# Patient Record
Sex: MALE | Race: WHITE | NOT HISPANIC OR LATINO
[De-identification: names, ages, dates, MRNs, and addresses within clinical notes are randomized per-mention and may not be internally consistent; named-entity substitution may affect disease eponyms.]

---

## 2017-01-09 ENCOUNTER — RX RENEWAL (OUTPATIENT)
Age: 57
End: 2017-01-09

## 2017-01-19 ENCOUNTER — MEDICATION RENEWAL (OUTPATIENT)
Age: 57
End: 2017-01-19

## 2017-02-01 ENCOUNTER — NON-APPOINTMENT (OUTPATIENT)
Age: 57
End: 2017-02-01

## 2017-02-01 ENCOUNTER — APPOINTMENT (OUTPATIENT)
Dept: CARDIOLOGY | Facility: CLINIC | Age: 57
End: 2017-02-01

## 2017-02-01 VITALS
HEART RATE: 87 BPM | SYSTOLIC BLOOD PRESSURE: 138 MMHG | OXYGEN SATURATION: 98 % | TEMPERATURE: 98.2 F | HEIGHT: 70 IN | RESPIRATION RATE: 12 BRPM | DIASTOLIC BLOOD PRESSURE: 86 MMHG | WEIGHT: 272 LBS | BODY MASS INDEX: 38.94 KG/M2

## 2017-02-01 VITALS — DIASTOLIC BLOOD PRESSURE: 75 MMHG | SYSTOLIC BLOOD PRESSURE: 115 MMHG

## 2017-02-02 LAB
24R-OH-CALCIDIOL SERPL-MCNC: 19.3 PG/ML
ALBUMIN SERPL ELPH-MCNC: 4.1 G/DL
ALP BLD-CCNC: 59 U/L
ALT SERPL-CCNC: 20 U/L
ANION GAP SERPL CALC-SCNC: 14 MMOL/L
AST SERPL-CCNC: 21 U/L
BASOPHILS # BLD AUTO: 0.07 K/UL
BASOPHILS NFR BLD AUTO: 0.8 %
BILIRUB SERPL-MCNC: 0.6 MG/DL
BUN SERPL-MCNC: 18 MG/DL
CALCIUM SERPL-MCNC: 10 MG/DL
CHLORIDE SERPL-SCNC: 99 MMOL/L
CHOLEST SERPL-MCNC: 232 MG/DL
CHOLEST/HDLC SERPL: 7.5 RATIO
CO2 SERPL-SCNC: 24 MMOL/L
CREAT SERPL-MCNC: 0.99 MG/DL
EOSINOPHIL # BLD AUTO: 0.13 K/UL
EOSINOPHIL NFR BLD AUTO: 1.5 %
GLUCOSE SERPL-MCNC: 158 MG/DL
HBA1C MFR BLD HPLC: 8.3 %
HCT VFR BLD CALC: 44 %
HDLC SERPL-MCNC: 31 MG/DL
HGB BLD-MCNC: 14.1 G/DL
IMM GRANULOCYTES NFR BLD AUTO: 0.2 %
LDLC SERPL CALC-MCNC: 148 MG/DL
LYMPHOCYTES # BLD AUTO: 2.56 K/UL
LYMPHOCYTES NFR BLD AUTO: 29.3 %
MAN DIFF?: NORMAL
MCHC RBC-ENTMCNC: 28.7 PG
MCHC RBC-ENTMCNC: 32 GM/DL
MCV RBC AUTO: 89.6 FL
MONOCYTES # BLD AUTO: 0.53 K/UL
MONOCYTES NFR BLD AUTO: 6.1 %
NEUTROPHILS # BLD AUTO: 5.43 K/UL
NEUTROPHILS NFR BLD AUTO: 62.1 %
PLATELET # BLD AUTO: 299 K/UL
POTASSIUM SERPL-SCNC: 4.5 MMOL/L
PROT SERPL-MCNC: 7.9 G/DL
RBC # BLD: 4.91 M/UL
RBC # FLD: 13.3 %
SODIUM SERPL-SCNC: 137 MMOL/L
TRIGL SERPL-MCNC: 263 MG/DL
WBC # FLD AUTO: 8.74 K/UL

## 2017-02-13 ENCOUNTER — APPOINTMENT (OUTPATIENT)
Dept: UROLOGY | Facility: CLINIC | Age: 57
End: 2017-02-13

## 2017-02-13 VITALS
RESPIRATION RATE: 12 BRPM | BODY MASS INDEX: 39.51 KG/M2 | DIASTOLIC BLOOD PRESSURE: 85 MMHG | SYSTOLIC BLOOD PRESSURE: 133 MMHG | HEIGHT: 70 IN | OXYGEN SATURATION: 98 % | WEIGHT: 276 LBS | HEART RATE: 86 BPM

## 2017-02-13 DIAGNOSIS — I21.09 ST ELEVATION (STEMI) MYOCARDIAL INFARCTION INVOLVING OTHER CORONARY ARTERY OF ANTERIOR WALL: ICD-10-CM

## 2017-03-06 ENCOUNTER — MEDICATION RENEWAL (OUTPATIENT)
Age: 57
End: 2017-03-06

## 2017-03-13 ENCOUNTER — APPOINTMENT (OUTPATIENT)
Dept: UROLOGY | Facility: CLINIC | Age: 57
End: 2017-03-13

## 2017-04-04 ENCOUNTER — APPOINTMENT (OUTPATIENT)
Dept: ENDOCRINOLOGY | Facility: CLINIC | Age: 57
End: 2017-04-04

## 2017-05-08 ENCOUNTER — APPOINTMENT (OUTPATIENT)
Dept: CARDIOLOGY | Facility: CLINIC | Age: 57
End: 2017-05-08

## 2017-07-28 ENCOUNTER — EMERGENCY (EMERGENCY)
Facility: HOSPITAL | Age: 57
LOS: 1 days | Discharge: HOME | End: 2017-07-28
Attending: EMERGENCY MEDICINE | Admitting: EMERGENCY MEDICINE
Payer: COMMERCIAL

## 2017-07-28 VITALS
RESPIRATION RATE: 16 BRPM | TEMPERATURE: 99 F | HEART RATE: 110 BPM | DIASTOLIC BLOOD PRESSURE: 101 MMHG | OXYGEN SATURATION: 98 % | SYSTOLIC BLOOD PRESSURE: 162 MMHG

## 2017-07-28 DIAGNOSIS — S62.101A FRACTURE OF UNSPECIFIED CARPAL BONE, RIGHT WRIST, INITIAL ENCOUNTER FOR CLOSED FRACTURE: Chronic | ICD-10-CM

## 2017-07-28 DIAGNOSIS — M43.22 FUSION OF SPINE, CERVICAL REGION: Chronic | ICD-10-CM

## 2017-07-28 PROCEDURE — 99285 EMERGENCY DEPT VISIT HI MDM: CPT | Mod: 25
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
## 2017-07-29 VITALS
SYSTOLIC BLOOD PRESSURE: 148 MMHG | HEART RATE: 90 BPM | RESPIRATION RATE: 18 BRPM | DIASTOLIC BLOOD PRESSURE: 84 MMHG | OXYGEN SATURATION: 98 % | TEMPERATURE: 98 F

## 2017-07-29 PROCEDURE — 73100 X-RAY EXAM OF WRIST: CPT | Mod: 26,LT

## 2017-07-29 PROCEDURE — 73090 X-RAY EXAM OF FOREARM: CPT | Mod: 26,LT

## 2017-07-29 PROCEDURE — 73060 X-RAY EXAM OF HUMERUS: CPT | Mod: 26,LT

## 2017-07-29 PROCEDURE — 73130 X-RAY EXAM OF HAND: CPT | Mod: 26,LT

## 2017-07-29 PROCEDURE — 73080 X-RAY EXAM OF ELBOW: CPT | Mod: 26,LT

## 2017-07-29 PROCEDURE — 71010: CPT | Mod: 26

## 2017-07-29 PROCEDURE — 70486 CT MAXILLOFACIAL W/O DYE: CPT | Mod: 26

## 2017-07-29 PROCEDURE — 71101 X-RAY EXAM UNILAT RIBS/CHEST: CPT | Mod: 26,RT

## 2017-07-29 PROCEDURE — 70450 CT HEAD/BRAIN W/O DYE: CPT | Mod: 26

## 2017-07-29 RX ORDER — OXYCODONE AND ACETAMINOPHEN 5; 325 MG/1; MG/1
1 TABLET ORAL ONCE
Qty: 0 | Refills: 0 | Status: DISCONTINUED | OUTPATIENT
Start: 2017-07-29 | End: 2017-07-29

## 2017-07-29 RX ORDER — TETANUS TOXOID, REDUCED DIPHTHERIA TOXOID AND ACELLULAR PERTUSSIS VACCINE, ADSORBED 5; 2.5; 8; 8; 2.5 [IU]/.5ML; [IU]/.5ML; UG/.5ML; UG/.5ML; UG/.5ML
0.5 SUSPENSION INTRAMUSCULAR ONCE
Qty: 0 | Refills: 0 | Status: COMPLETED | OUTPATIENT
Start: 2017-07-29 | End: 2017-07-29

## 2017-07-29 RX ORDER — MORPHINE SULFATE 50 MG/1
6 CAPSULE, EXTENDED RELEASE ORAL ONCE
Qty: 0 | Refills: 0 | Status: DISCONTINUED | OUTPATIENT
Start: 2017-07-29 | End: 2017-07-29

## 2017-07-29 RX ADMIN — TETANUS TOXOID, REDUCED DIPHTHERIA TOXOID AND ACELLULAR PERTUSSIS VACCINE, ADSORBED 0.5 MILLILITER(S): 5; 2.5; 8; 8; 2.5 SUSPENSION INTRAMUSCULAR at 02:23

## 2017-07-29 RX ADMIN — OXYCODONE AND ACETAMINOPHEN 1 TABLET(S): 5; 325 TABLET ORAL at 02:21

## 2017-07-29 RX ADMIN — MORPHINE SULFATE 6 MILLIGRAM(S): 50 CAPSULE, EXTENDED RELEASE ORAL at 02:35

## 2017-07-29 RX ADMIN — MORPHINE SULFATE 6 MILLIGRAM(S): 50 CAPSULE, EXTENDED RELEASE ORAL at 04:58

## 2017-07-29 RX ADMIN — OXYCODONE AND ACETAMINOPHEN 1 TABLET(S): 5; 325 TABLET ORAL at 01:21

## 2017-07-29 RX ADMIN — MORPHINE SULFATE 6 MILLIGRAM(S): 50 CAPSULE, EXTENDED RELEASE ORAL at 05:12

## 2017-07-29 RX ADMIN — MORPHINE SULFATE 6 MILLIGRAM(S): 50 CAPSULE, EXTENDED RELEASE ORAL at 02:50

## 2017-07-29 NOTE — ED ADULT NURSE REASSESSMENT NOTE - NS ED NURSE REASSESS COMMENT FT1
pt. in NAD at this time, Ortho at bedside to put cast on left wrist. MOrphine 6mg given as ordered prior to procedure.

## 2017-07-29 NOTE — ED ADULT NURSE REASSESSMENT NOTE - NS ED NURSE REASSESS COMMENT FT1
Pt has cast in place to L Forearm, verified placement by Ortho & XR.  Pt states readiness for DC to home.  IVL dc'd intact. MD Logdberg @ bedside to review dc instructions & follow up.  Wife @ bedside to asst pt in getting dressed and wheelchair provided to get to car.
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
## 2017-07-29 NOTE — ED PROVIDER NOTE - PROGRESS NOTE DETAILS
Logdberg PGY4: Distal radius/ulna fx on xray, otherwise imaging wnl except facial hematoma. Seen by ortho and reduced, feeling better. given rx for percocets. Instructed f/u ortho in 1 week. Ready for d/c.
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
## 2017-07-29 NOTE — ED PROVIDER NOTE - MUSCULOSKELETAL MINIMAL EXAM
RANGE OF MOTION LIMITED/L wrist and forearm swollen, limited ROM; radial pulses intact b/l; full ROM L hand; hematoma L eye
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
## 2017-08-02 ENCOUNTER — APPOINTMENT (OUTPATIENT)
Dept: ORTHOPEDIC SURGERY | Facility: CLINIC | Age: 57
End: 2017-08-02
Payer: COMMERCIAL

## 2017-08-02 PROCEDURE — 99204 OFFICE O/P NEW MOD 45 MIN: CPT

## 2017-08-03 ENCOUNTER — APPOINTMENT (OUTPATIENT)
Dept: CARDIOLOGY | Facility: CLINIC | Age: 57
End: 2017-08-03
Payer: COMMERCIAL

## 2017-08-03 ENCOUNTER — APPOINTMENT (OUTPATIENT)
Dept: INTERNAL MEDICINE | Facility: CLINIC | Age: 57
End: 2017-08-03
Payer: COMMERCIAL

## 2017-08-03 ENCOUNTER — OUTPATIENT (OUTPATIENT)
Dept: OUTPATIENT SERVICES | Facility: HOSPITAL | Age: 57
LOS: 1 days | End: 2017-08-03
Payer: COMMERCIAL

## 2017-08-03 ENCOUNTER — NON-APPOINTMENT (OUTPATIENT)
Age: 57
End: 2017-08-03

## 2017-08-03 VITALS
HEART RATE: 80 BPM | HEIGHT: 69 IN | RESPIRATION RATE: 18 BRPM | DIASTOLIC BLOOD PRESSURE: 80 MMHG | WEIGHT: 270.07 LBS | SYSTOLIC BLOOD PRESSURE: 118 MMHG | TEMPERATURE: 98 F

## 2017-08-03 VITALS
HEART RATE: 108 BPM | WEIGHT: 274 LBS | DIASTOLIC BLOOD PRESSURE: 79 MMHG | TEMPERATURE: 98 F | HEIGHT: 70 IN | OXYGEN SATURATION: 95 % | SYSTOLIC BLOOD PRESSURE: 130 MMHG | BODY MASS INDEX: 39.22 KG/M2 | RESPIRATION RATE: 12 BRPM

## 2017-08-03 DIAGNOSIS — S52.122A DISPLACED FRACTURE OF HEAD OF LEFT RADIUS, INITIAL ENCOUNTER FOR CLOSED FRACTURE: ICD-10-CM

## 2017-08-03 DIAGNOSIS — S52.572A OTHER INTRAARTICULAR FRACTURE OF LOWER END OF LEFT RADIUS, INITIAL ENCOUNTER FOR CLOSED FRACTURE: ICD-10-CM

## 2017-08-03 DIAGNOSIS — Z01.818 ENCOUNTER FOR OTHER PREPROCEDURAL EXAMINATION: ICD-10-CM

## 2017-08-03 DIAGNOSIS — E11.9 TYPE 2 DIABETES MELLITUS WITHOUT COMPLICATIONS: ICD-10-CM

## 2017-08-03 DIAGNOSIS — M43.22 FUSION OF SPINE, CERVICAL REGION: Chronic | ICD-10-CM

## 2017-08-03 DIAGNOSIS — Z98.890 OTHER SPECIFIED POSTPROCEDURAL STATES: Chronic | ICD-10-CM

## 2017-08-03 DIAGNOSIS — G47.33 OBSTRUCTIVE SLEEP APNEA (ADULT) (PEDIATRIC): ICD-10-CM

## 2017-08-03 DIAGNOSIS — I50.9 HEART FAILURE, UNSPECIFIED: ICD-10-CM

## 2017-08-03 DIAGNOSIS — S62.101A FRACTURE OF UNSPECIFIED CARPAL BONE, RIGHT WRIST, INITIAL ENCOUNTER FOR CLOSED FRACTURE: Chronic | ICD-10-CM

## 2017-08-03 DIAGNOSIS — K59.00 CONSTIPATION, UNSPECIFIED: ICD-10-CM

## 2017-08-03 DIAGNOSIS — I10 ESSENTIAL (PRIMARY) HYPERTENSION: ICD-10-CM

## 2017-08-03 LAB
BUN SERPL-MCNC: 17 MG/DL — SIGNIFICANT CHANGE UP (ref 7–23)
CALCIUM SERPL-MCNC: 9.5 MG/DL — SIGNIFICANT CHANGE UP (ref 8.4–10.5)
CHLORIDE SERPL-SCNC: 97 MMOL/L — LOW (ref 98–107)
CO2 SERPL-SCNC: 27 MMOL/L — SIGNIFICANT CHANGE UP (ref 22–31)
CREAT SERPL-MCNC: 1 MG/DL — SIGNIFICANT CHANGE UP (ref 0.5–1.3)
GLUCOSE SERPL-MCNC: 240 MG/DL — HIGH (ref 70–99)
HBA1C BLD-MCNC: 8.2 % — HIGH (ref 4–5.6)
HCT VFR BLD CALC: 36.6 % — LOW (ref 39–50)
HGB BLD-MCNC: 12.3 G/DL — LOW (ref 13–17)
MCHC RBC-ENTMCNC: 28.8 PG — SIGNIFICANT CHANGE UP (ref 27–34)
MCHC RBC-ENTMCNC: 33.6 % — SIGNIFICANT CHANGE UP (ref 32–36)
MCV RBC AUTO: 85.7 FL — SIGNIFICANT CHANGE UP (ref 80–100)
NRBC # FLD: 0 — SIGNIFICANT CHANGE UP
PLATELET # BLD AUTO: 275 K/UL — SIGNIFICANT CHANGE UP (ref 150–400)
PMV BLD: 9.7 FL — SIGNIFICANT CHANGE UP (ref 7–13)
POTASSIUM SERPL-MCNC: 4.2 MMOL/L — SIGNIFICANT CHANGE UP (ref 3.5–5.3)
POTASSIUM SERPL-SCNC: 4.2 MMOL/L — SIGNIFICANT CHANGE UP (ref 3.5–5.3)
RBC # BLD: 4.27 M/UL — SIGNIFICANT CHANGE UP (ref 4.2–5.8)
RBC # FLD: 13 % — SIGNIFICANT CHANGE UP (ref 10.3–14.5)
SODIUM SERPL-SCNC: 136 MMOL/L — SIGNIFICANT CHANGE UP (ref 135–145)
WBC # BLD: 7.92 K/UL — SIGNIFICANT CHANGE UP (ref 3.8–10.5)
WBC # FLD AUTO: 7.92 K/UL — SIGNIFICANT CHANGE UP (ref 3.8–10.5)

## 2017-08-03 PROCEDURE — 99214 OFFICE O/P EST MOD 30 MIN: CPT

## 2017-08-03 PROCEDURE — 99215 OFFICE O/P EST HI 40 MIN: CPT | Mod: 25

## 2017-08-03 PROCEDURE — 93010 ELECTROCARDIOGRAM REPORT: CPT

## 2017-08-03 PROCEDURE — 85610 PROTHROMBIN TIME: CPT | Mod: QW

## 2017-08-03 RX ORDER — SODIUM CHLORIDE 9 MG/ML
1000 INJECTION, SOLUTION INTRAVENOUS
Qty: 0 | Refills: 0 | Status: DISCONTINUED | OUTPATIENT
Start: 2017-08-04 | End: 2017-08-19

## 2017-08-03 RX ORDER — METHYLPREDNISOLONE 4 MG/1
4 TABLET ORAL
Qty: 21 | Refills: 0 | Status: DISCONTINUED | COMMUNITY
Start: 2017-06-13 | End: 2017-08-03

## 2017-08-03 RX ORDER — CEPHALEXIN 500 MG/1
500 CAPSULE ORAL
Qty: 28 | Refills: 0 | Status: DISCONTINUED | COMMUNITY
Start: 2017-06-05 | End: 2017-08-03

## 2017-08-03 RX ORDER — CLOBETASOL PROPIONATE 0.5 MG/G
0.05 CREAM TOPICAL
Qty: 60 | Refills: 0 | Status: DISCONTINUED | COMMUNITY
Start: 2017-06-13 | End: 2017-08-03

## 2017-08-03 NOTE — H&P PST ADULT - LYMPHATIC
anterior cervical L/anterior cervical R/posterior cervical L/posterior cervical R/supraclavicular L/supraclavicular R

## 2017-08-03 NOTE — H&P PST ADULT - PMH
CAD (coronary artery disease)    DM (diabetes mellitus)    Essential hypertension    Heart failure  4/2016 , request last echo to be faxed from Dr Quispe with cardiac clearance  Obesity (BMI 30-39.9)  BMI 39.9  Other hyperlipidemia    Stented coronary artery  times 6 ; last 4/2016 total of 6 stents , on brilinta and aspirin which has not been discontinued , cardiac evaluation scheduled today

## 2017-08-03 NOTE — H&P PST ADULT - NSANTHOSAYNRD_GEN_A_CORE
No. REYMUNDO screening performed.  STOP BANG Legend: 0-2 = LOW Risk; 3-4 = INTERMEDIATE Risk; 5-8 = HIGH Risk

## 2017-08-03 NOTE — H&P PST ADULT - PROBLEM SELECTOR PLAN 1
Left distal radius open reduction internal fixation   Cardiac and medical clearance today , one day pre-op ; OR tomorrow  discussed with anesthesia, Medical director for PST

## 2017-08-03 NOTE — H&P PST ADULT - MUSCULOSKELETAL COMMENTS
left arm pain left arm in splint from hand to just over elbow , all digits mobile , warm to touch ,swollen

## 2017-08-03 NOTE — H&P PST ADULT - NEGATIVE ENMT SYMPTOMS
no ear pain/no post-nasal discharge/no dysphagia/no hearing difficulty/no gum bleeding/no nose bleeds/no throat pain

## 2017-08-03 NOTE — H&P PST ADULT - TEACHING/LEARNING LEARNING PREFERENCES
computer/internet/group instruction/pictorial/audio/verbal instruction/skill demonstration/video/written material/individual instruction

## 2017-08-03 NOTE — H&P PST ADULT - FAMILY HISTORY
Father  Still living? Unknown  Family history of coronary artery disease, Age at diagnosis: Age Unknown  Family history of diabetes mellitus (DM), Age at diagnosis: Age Unknown     Mother  Still living? Unknown  Family history of coronary artery disease, Age at diagnosis: Age Unknown  Family history of diabetes mellitus (DM), Age at diagnosis: Age Unknown

## 2017-08-03 NOTE — H&P PST ADULT - PSH
Cervical vertebral fusion  with hardware  H/O shoulder surgery  right - 1990's  Right wrist fracture  with hardware

## 2017-08-03 NOTE — H&P PST ADULT - PROBLEM SELECTOR PLAN 4
bloods pending including hgba1c. Pt to hold metformin tonight and in am , along with glipizide . STAT FS upon admit , monitor FS during hospital stay .

## 2017-08-03 NOTE — H&P PST ADULT - PROBLEM SELECTOR PLAN 5
STOP BANG 4 ; REYMUNDO precautions called to OR booking , left message , precautions faxed to OR booking.

## 2017-08-03 NOTE — H&P PST ADULT - PROBLEM SELECTOR PLAN 3
4/2016 with stents to heart for total of 6 , cardiac clearance pending today . Pt is on brilinta and aspirin , both to continue for surgery tomorrow. Requested last echo to be faxed .
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
## 2017-08-04 ENCOUNTER — APPOINTMENT (OUTPATIENT)
Dept: ORTHOPEDIC SURGERY | Facility: HOSPITAL | Age: 57
End: 2017-08-04

## 2017-08-04 ENCOUNTER — OUTPATIENT (OUTPATIENT)
Dept: OUTPATIENT SERVICES | Facility: HOSPITAL | Age: 57
LOS: 1 days | Discharge: HOME | End: 2017-08-04
Payer: COMMERCIAL

## 2017-08-04 ENCOUNTER — TRANSCRIPTION ENCOUNTER (OUTPATIENT)
Age: 57
End: 2017-08-04

## 2017-08-04 VITALS
OXYGEN SATURATION: 96 % | DIASTOLIC BLOOD PRESSURE: 75 MMHG | WEIGHT: 270.07 LBS | HEIGHT: 69 IN | SYSTOLIC BLOOD PRESSURE: 124 MMHG | HEART RATE: 81 BPM | TEMPERATURE: 99 F | RESPIRATION RATE: 15 BRPM

## 2017-08-04 VITALS
OXYGEN SATURATION: 100 % | RESPIRATION RATE: 16 BRPM | SYSTOLIC BLOOD PRESSURE: 153 MMHG | DIASTOLIC BLOOD PRESSURE: 70 MMHG | HEART RATE: 87 BPM

## 2017-08-04 DIAGNOSIS — S52.572A OTHER INTRAARTICULAR FRACTURE OF LOWER END OF LEFT RADIUS, INITIAL ENCOUNTER FOR CLOSED FRACTURE: ICD-10-CM

## 2017-08-04 DIAGNOSIS — Z98.890 OTHER SPECIFIED POSTPROCEDURAL STATES: Chronic | ICD-10-CM

## 2017-08-04 DIAGNOSIS — M43.22 FUSION OF SPINE, CERVICAL REGION: Chronic | ICD-10-CM

## 2017-08-04 DIAGNOSIS — S62.101A FRACTURE OF UNSPECIFIED CARPAL BONE, RIGHT WRIST, INITIAL ENCOUNTER FOR CLOSED FRACTURE: Chronic | ICD-10-CM

## 2017-08-04 PROBLEM — Z01.818 PRE-OP EVALUATION: Status: ACTIVE | Noted: 2017-08-03

## 2017-08-04 PROBLEM — S52.122A LEFT RADIAL HEAD FRACTURE: Status: ACTIVE | Noted: 2017-08-03

## 2017-08-04 LAB
INR PPP: 1.1 RATIO
POCT-PROTHROMBIN TIME: 12.8 SECS
QUALITY CONTROL: YES

## 2017-08-04 PROCEDURE — 25295 RELEASE WRIST/FOREARM TENDON: CPT | Mod: LT

## 2017-08-04 PROCEDURE — 25609 OPTX DST RD XART FX/EP SEP3+: CPT | Mod: LT

## 2017-08-04 PROCEDURE — 25290 INCISE WRIST/FOREARM TENDON: CPT | Mod: LT

## 2017-08-04 RX ORDER — TICAGRELOR 90 MG/1
1 TABLET ORAL
Qty: 0 | Refills: 0 | COMMUNITY

## 2017-08-04 RX ORDER — SODIUM CHLORIDE 9 MG/ML
1000 INJECTION, SOLUTION INTRAVENOUS
Qty: 0 | Refills: 0 | Status: DISCONTINUED | OUTPATIENT
Start: 2017-08-04 | End: 2017-08-19

## 2017-08-04 RX ORDER — CHOLECALCIFEROL (VITAMIN D3) 125 MCG
1 CAPSULE ORAL
Qty: 0 | Refills: 0 | COMMUNITY

## 2017-08-04 RX ORDER — LISINOPRIL 2.5 MG/1
1 TABLET ORAL
Qty: 0 | Refills: 0 | COMMUNITY

## 2017-08-04 NOTE — ASU DISCHARGE PLAN (ADULT/PEDIATRIC). - SPECIAL INSTRUCTIONS
Pt can and should move fingers and wrist as tolerated.  Non-weightbearing LUE, can d/c sling once block wears off. Keep hand/arm elevated as much as possible to decrease swelling.  Keep dressing intact until f/u appt.

## 2017-08-04 NOTE — ASU DISCHARGE PLAN (ADULT/PEDIATRIC). - NOTIFY
Persistent Nausea and Vomiting/Pain not relieved by Medications/Bleeding that does not stop/Fever greater than 101

## 2017-08-04 NOTE — ASU DISCHARGE PLAN (ADULT/PEDIATRIC). - NURSING INSTRUCTIONS
Stable for discharge   Do not take pain medication on an empty stomach.  Increase fluids and fiber in diet to prevent constipation.    Wear sling until sensation returns completely to your Left Upper Extremity. Sling can be removed to change clothes when sling is off hold your arm to protect it from injury.

## 2017-08-04 NOTE — PROGRESS NOTE ADULT - SUBJECTIVE AND OBJECTIVE BOX
ORTHO ATTENDING POST OP    s/p ORIF L  distal radius  Bulky dressing  strict elevation  keep dressing clean and dry until office visit  sling  NWB LUE  ROM as tolerated RUE  percocet to home pharmacy  f/u next week  encourage ROM fingers

## 2017-08-04 NOTE — ASU DISCHARGE PLAN (ADULT/PEDIATRIC). - MEDICATION SUMMARY - MEDICATIONS TO TAKE
I will START or STAY ON the medications listed below when I get home from the hospital:    Percocet 5/325 325 mg-5 mg oral tablet  -- 1-2 tab(s) by mouth every 4-6 hours, As Needed MDD:9  -- Caution federal law prohibits the transfer of this drug to any person other  than the person for whom it was prescribed.  May cause drowsiness.  Alcohol may intensify this effect.  Use care when operating dangerous machinery.  This prescription cannot be refilled.  This product contains acetaminophen.  Do not use  with any other product containing acetaminophen to prevent possible liver damage.  Using more of this medication than prescribed may cause serious breathing problems.    -- Indication: For pain

## 2017-08-04 NOTE — ASU PATIENT PROFILE, ADULT - TEACHING/LEARNING LEARNING PREFERENCES
video/verbal instruction/skill demonstration/individual instruction/computer/internet/group instruction/audio/pictorial/written material

## 2017-08-09 ENCOUNTER — APPOINTMENT (OUTPATIENT)
Dept: ORTHOPEDIC SURGERY | Facility: CLINIC | Age: 57
End: 2017-08-09
Payer: COMMERCIAL

## 2017-08-09 PROCEDURE — 99024 POSTOP FOLLOW-UP VISIT: CPT

## 2017-09-19 ENCOUNTER — APPOINTMENT (OUTPATIENT)
Dept: ORTHOPEDIC SURGERY | Facility: CLINIC | Age: 57
End: 2017-09-19
Payer: COMMERCIAL

## 2017-09-19 PROCEDURE — 99024 POSTOP FOLLOW-UP VISIT: CPT

## 2017-09-19 PROCEDURE — 73110 X-RAY EXAM OF WRIST: CPT | Mod: LT

## 2017-11-01 ENCOUNTER — APPOINTMENT (OUTPATIENT)
Dept: ORTHOPEDIC SURGERY | Facility: CLINIC | Age: 57
End: 2017-11-01
Payer: COMMERCIAL

## 2017-11-01 DIAGNOSIS — S52.572A OTHER INTRAARTICULAR FRACTURE OF LOWER END OF LEFT RADIUS, INITIAL ENCOUNTER FOR CLOSED FRACTURE: ICD-10-CM

## 2017-11-01 PROCEDURE — 73110 X-RAY EXAM OF WRIST: CPT | Mod: LT

## 2017-11-01 PROCEDURE — 99024 POSTOP FOLLOW-UP VISIT: CPT

## 2017-11-02 PROBLEM — S52.572A: Status: ACTIVE | Noted: 2017-08-04

## 2017-12-27 ENCOUNTER — APPOINTMENT (OUTPATIENT)
Dept: ORTHOPEDIC SURGERY | Facility: CLINIC | Age: 57
End: 2017-12-27

## 2017-12-29 ENCOUNTER — APPOINTMENT (OUTPATIENT)
Dept: INTERNAL MEDICINE | Facility: CLINIC | Age: 57
End: 2017-12-29
Payer: COMMERCIAL

## 2017-12-29 VITALS
HEIGHT: 70 IN | DIASTOLIC BLOOD PRESSURE: 80 MMHG | HEART RATE: 88 BPM | TEMPERATURE: 99.3 F | OXYGEN SATURATION: 98 % | SYSTOLIC BLOOD PRESSURE: 116 MMHG | RESPIRATION RATE: 12 BRPM

## 2017-12-29 DIAGNOSIS — J06.9 ACUTE UPPER RESPIRATORY INFECTION, UNSPECIFIED: ICD-10-CM

## 2017-12-29 PROCEDURE — 99214 OFFICE O/P EST MOD 30 MIN: CPT

## 2018-01-25 ENCOUNTER — NON-APPOINTMENT (OUTPATIENT)
Age: 58
End: 2018-01-25

## 2018-01-25 ENCOUNTER — APPOINTMENT (OUTPATIENT)
Dept: INTERNAL MEDICINE | Facility: CLINIC | Age: 58
End: 2018-01-25
Payer: COMMERCIAL

## 2018-01-25 ENCOUNTER — APPOINTMENT (OUTPATIENT)
Dept: CARDIOLOGY | Facility: CLINIC | Age: 58
End: 2018-01-25
Payer: COMMERCIAL

## 2018-01-25 VITALS
HEART RATE: 99 BPM | TEMPERATURE: 99.8 F | SYSTOLIC BLOOD PRESSURE: 120 MMHG | HEIGHT: 70 IN | WEIGHT: 262 LBS | OXYGEN SATURATION: 100 % | DIASTOLIC BLOOD PRESSURE: 82 MMHG | RESPIRATION RATE: 12 BRPM | BODY MASS INDEX: 37.51 KG/M2

## 2018-01-25 DIAGNOSIS — Z87.09 PERSONAL HISTORY OF OTHER DISEASES OF THE RESPIRATORY SYSTEM: ICD-10-CM

## 2018-01-25 DIAGNOSIS — R50.9 FEVER, UNSPECIFIED: ICD-10-CM

## 2018-01-25 PROCEDURE — 99214 OFFICE O/P EST MOD 30 MIN: CPT

## 2018-01-25 PROCEDURE — 93000 ELECTROCARDIOGRAM COMPLETE: CPT

## 2018-01-25 PROCEDURE — 99215 OFFICE O/P EST HI 40 MIN: CPT | Mod: 25

## 2018-01-26 LAB
ALBUMIN SERPL ELPH-MCNC: 4.1 G/DL
ALP BLD-CCNC: 79 U/L
ALT SERPL-CCNC: 23 U/L
ANION GAP SERPL CALC-SCNC: 16 MMOL/L
AST SERPL-CCNC: 23 U/L
BASOPHILS # BLD AUTO: 0.03 K/UL
BASOPHILS NFR BLD AUTO: 0.4 %
BILIRUB SERPL-MCNC: 0.4 MG/DL
BUN SERPL-MCNC: 21 MG/DL
CALCIUM SERPL-MCNC: 9.7 MG/DL
CHLORIDE SERPL-SCNC: 98 MMOL/L
CHOLEST SERPL-MCNC: 203 MG/DL
CHOLEST/HDLC SERPL: 8.8 RATIO
CO2 SERPL-SCNC: 23 MMOL/L
CREAT SERPL-MCNC: 1.27 MG/DL
EOSINOPHIL # BLD AUTO: 0.19 K/UL
EOSINOPHIL NFR BLD AUTO: 2.8 %
GLUCOSE SERPL-MCNC: 193 MG/DL
HBA1C MFR BLD HPLC: 8.1 %
HCT VFR BLD CALC: 38.7 %
HDLC SERPL-MCNC: 23 MG/DL
HGB BLD-MCNC: 12.7 G/DL
IMM GRANULOCYTES NFR BLD AUTO: 0.1 %
LDLC SERPL CALC-MCNC: 148 MG/DL
LYMPHOCYTES # BLD AUTO: 2.08 K/UL
LYMPHOCYTES NFR BLD AUTO: 31.1 %
MAN DIFF?: NORMAL
MCHC RBC-ENTMCNC: 28.3 PG
MCHC RBC-ENTMCNC: 32.8 GM/DL
MCV RBC AUTO: 86.4 FL
MONOCYTES # BLD AUTO: 0.64 K/UL
MONOCYTES NFR BLD AUTO: 9.6 %
NEUTROPHILS # BLD AUTO: 3.73 K/UL
NEUTROPHILS NFR BLD AUTO: 56 %
NT-PROBNP SERPL-MCNC: 562 PG/ML
PLATELET # BLD AUTO: 283 K/UL
POTASSIUM SERPL-SCNC: 4.6 MMOL/L
PROT SERPL-MCNC: 8 G/DL
RBC # BLD: 4.48 M/UL
RBC # FLD: 12.9 %
SODIUM SERPL-SCNC: 137 MMOL/L
TRIGL SERPL-MCNC: 159 MG/DL
WBC # FLD AUTO: 6.68 K/UL

## 2018-02-01 ENCOUNTER — APPOINTMENT (OUTPATIENT)
Dept: INTERNAL MEDICINE | Facility: CLINIC | Age: 58
End: 2018-02-01

## 2018-02-14 ENCOUNTER — RX RENEWAL (OUTPATIENT)
Age: 58
End: 2018-02-14

## 2018-07-16 PROBLEM — E66.9 OBESITY, UNSPECIFIED: Chronic | Status: ACTIVE | Noted: 2017-08-03

## 2018-08-13 ENCOUNTER — RX RENEWAL (OUTPATIENT)
Age: 58
End: 2018-08-13

## 2018-09-13 PROBLEM — I10 ESSENTIAL (PRIMARY) HYPERTENSION: Chronic | Status: ACTIVE | Noted: 2017-08-03

## 2018-09-13 PROBLEM — I50.9 HEART FAILURE, UNSPECIFIED: Chronic | Status: ACTIVE | Noted: 2017-08-03

## 2018-09-13 PROBLEM — Z95.5 PRESENCE OF CORONARY ANGIOPLASTY IMPLANT AND GRAFT: Chronic | Status: ACTIVE | Noted: 2017-08-03

## 2018-09-20 ENCOUNTER — APPOINTMENT (OUTPATIENT)
Dept: INTERNAL MEDICINE | Facility: CLINIC | Age: 58
End: 2018-09-20
Payer: COMMERCIAL

## 2018-09-20 VITALS
RESPIRATION RATE: 12 BRPM | OXYGEN SATURATION: 100 % | TEMPERATURE: 98.2 F | BODY MASS INDEX: 38.37 KG/M2 | HEART RATE: 93 BPM | DIASTOLIC BLOOD PRESSURE: 85 MMHG | HEIGHT: 70 IN | WEIGHT: 268 LBS | SYSTOLIC BLOOD PRESSURE: 133 MMHG

## 2018-09-20 PROCEDURE — 99214 OFFICE O/P EST MOD 30 MIN: CPT

## 2018-09-20 RX ORDER — AMOXICILLIN AND CLAVULANATE POTASSIUM 875; 125 MG/1; MG/1
875-125 TABLET, COATED ORAL TWICE DAILY
Qty: 14 | Refills: 0 | Status: DISCONTINUED | COMMUNITY
Start: 2017-12-29 | End: 2018-09-20

## 2018-09-20 RX ORDER — OSELTAMIVIR PHOSPHATE 75 MG/1
75 CAPSULE ORAL TWICE DAILY
Qty: 1 | Refills: 0 | Status: DISCONTINUED | COMMUNITY
Start: 2018-01-25 | End: 2018-09-20

## 2018-09-20 RX ORDER — BUDESONIDE AND FORMOTEROL FUMARATE DIHYDRATE 160; 4.5 UG/1; UG/1
160-4.5 AEROSOL RESPIRATORY (INHALATION) TWICE DAILY
Qty: 1 | Refills: 0 | Status: DISCONTINUED | COMMUNITY
Start: 2018-01-25 | End: 2018-09-20

## 2018-09-20 RX ORDER — DOCUSATE SODIUM 100 MG/1
100 CAPSULE ORAL TWICE DAILY
Qty: 60 | Refills: 0 | Status: DISCONTINUED | COMMUNITY
Start: 2017-08-03 | End: 2018-09-20

## 2018-09-20 RX ORDER — DICLOFENAC SODIUM 50 MG/1
50 TABLET, DELAYED RELEASE ORAL
Qty: 20 | Refills: 0 | Status: DISCONTINUED | COMMUNITY
Start: 2017-06-09 | End: 2018-09-20

## 2018-09-20 RX ORDER — METHYLPREDNISOLONE 4 MG/1
4 TABLET ORAL
Qty: 1 | Refills: 0 | Status: DISCONTINUED | COMMUNITY
Start: 2017-12-29 | End: 2018-09-20

## 2018-09-20 RX ORDER — AMOXICILLIN AND CLAVULANATE POTASSIUM 875; 125 MG/1; MG/1
875-125 TABLET, COATED ORAL TWICE DAILY
Qty: 14 | Refills: 0 | Status: DISCONTINUED | COMMUNITY
Start: 2018-01-25 | End: 2018-09-20

## 2018-09-20 RX ORDER — OXYCODONE AND ACETAMINOPHEN 5; 325 MG/1; MG/1
5-325 TABLET ORAL
Qty: 40 | Refills: 0 | Status: DISCONTINUED | COMMUNITY
Start: 2017-08-09 | End: 2018-09-20

## 2018-09-20 RX ORDER — AMOXICILLIN 500 MG/1
500 CAPSULE ORAL
Qty: 21 | Refills: 0 | Status: DISCONTINUED | COMMUNITY
Start: 2017-12-26 | End: 2018-09-20

## 2018-09-20 RX ORDER — DOXYCYCLINE HYCLATE 50 MG/1
50 CAPSULE ORAL
Qty: 20 | Refills: 0 | Status: DISCONTINUED | COMMUNITY
Start: 2017-06-05 | End: 2018-09-20

## 2018-09-20 RX ORDER — TICAGRELOR 90 MG/1
90 TABLET ORAL
Refills: 0 | Status: DISCONTINUED | COMMUNITY
End: 2018-09-20

## 2018-09-20 NOTE — PHYSICAL EXAM
[No Acute Distress] : no acute distress [Well Nourished] : well nourished [Well Developed] : well developed [Well-Appearing] : well-appearing [Normal Sclera/Conjunctiva] : normal sclera/conjunctiva [EOMI] : extraocular movements intact [Normal Outer Ear/Nose] : the outer ears and nose were normal in appearance [Normal Oropharynx] : the oropharynx was normal [Normal TMs] : both tympanic membranes were normal [No JVD] : no jugular venous distention [Supple] : supple [No Lymphadenopathy] : no lymphadenopathy [No Respiratory Distress] : no respiratory distress  [Clear to Auscultation] : lungs were clear to auscultation bilaterally [No Accessory Muscle Use] : no accessory muscle use [Normal Rate] : normal rate  [Regular Rhythm] : with a regular rhythm [Normal S1, S2] : normal S1 and S2 [No Murmur] : no murmur heard [No Carotid Bruits] : no carotid bruits [Pedal Pulses Present] : the pedal pulses are present [No Edema] : there was no peripheral edema [Soft] : abdomen soft [Non Tender] : non-tender [Non-distended] : non-distended [No Masses] : no abdominal mass palpated [No HSM] : no HSM [Normal Bowel Sounds] : normal bowel sounds [Normal Posterior Cervical Nodes] : no posterior cervical lymphadenopathy [Normal Anterior Cervical Nodes] : no anterior cervical lymphadenopathy [No CVA Tenderness] : no CVA  tenderness [No Spinal Tenderness] : no spinal tenderness [No Joint Swelling] : no joint swelling [Grossly Normal Strength/Tone] : grossly normal strength/tone [No Rash] : no rash [Normal Gait] : normal gait [No Focal Deficits] : no focal deficits [Normal Affect] : the affect was normal [Normal Insight/Judgement] : insight and judgment were intact

## 2018-09-20 NOTE — HISTORY OF PRESENT ILLNESS
[de-identified] : 57 year old male with h/o DM/Hypertension/ Hyperlipidemia / CAD with stents presents c/o cough x 3 weeks. He describes cough as productive of yellow sputum which is becoming more dry at  night and associated with wheezing . He denies fever, chills,  CP/SOB, + chest tightness. He takes OTC decongestant without much improvement.

## 2018-09-27 ENCOUNTER — APPOINTMENT (OUTPATIENT)
Dept: INTERNAL MEDICINE | Facility: CLINIC | Age: 58
End: 2018-09-27
Payer: COMMERCIAL

## 2018-09-27 VITALS
TEMPERATURE: 98.5 F | WEIGHT: 271 LBS | HEART RATE: 92 BPM | RESPIRATION RATE: 12 BRPM | BODY MASS INDEX: 38.8 KG/M2 | DIASTOLIC BLOOD PRESSURE: 99 MMHG | OXYGEN SATURATION: 100 % | SYSTOLIC BLOOD PRESSURE: 157 MMHG | HEIGHT: 70 IN

## 2018-09-27 DIAGNOSIS — M79.672 PAIN IN LEFT FOOT: ICD-10-CM

## 2018-09-27 PROCEDURE — 99213 OFFICE O/P EST LOW 20 MIN: CPT

## 2018-09-27 PROCEDURE — 99396 PREV VISIT EST AGE 40-64: CPT

## 2018-09-27 RX ORDER — BENZONATATE 200 MG/1
200 CAPSULE ORAL
Qty: 20 | Refills: 0 | Status: DISCONTINUED | COMMUNITY
Start: 2018-09-20 | End: 2018-09-27

## 2018-09-27 RX ORDER — ALBUTEROL SULFATE 90 UG/1
108 (90 BASE) AEROSOL, METERED RESPIRATORY (INHALATION)
Qty: 18 | Refills: 0 | Status: DISCONTINUED | COMMUNITY
Start: 2017-12-29 | End: 2018-09-27

## 2018-09-27 RX ORDER — OXYCODONE AND ACETAMINOPHEN 5; 325 MG/1; MG/1
5-325 TABLET ORAL
Qty: 12 | Refills: 0 | Status: DISCONTINUED | COMMUNITY
Start: 2017-07-29 | End: 2018-09-27

## 2018-09-27 RX ORDER — FLUTICASONE PROPIONATE 50 UG/1
50 SPRAY, METERED NASAL DAILY
Qty: 1 | Refills: 0 | Status: DISCONTINUED | COMMUNITY
Start: 2017-12-29 | End: 2018-09-27

## 2018-09-27 RX ORDER — CEFUROXIME AXETIL 500 MG/1
500 TABLET ORAL
Qty: 14 | Refills: 0 | Status: DISCONTINUED | COMMUNITY
Start: 2018-09-20 | End: 2018-09-27

## 2018-10-02 PROBLEM — M79.672 LEFT FOOT PAIN: Status: ACTIVE | Noted: 2018-09-27

## 2018-10-02 NOTE — PLAN
[FreeTextEntry1] : 1. CAD / Hypertension\par ASA/ Brilinta / statins\par increase Lisinopril to 20 mg QD\par cardio follow up \par 2. DM\par continue Glipizide/ Metformin / Jardiance \par daily home glucose monitoring \par Hb A1C \par 3. see plan

## 2018-10-02 NOTE — PHYSICAL EXAM
[No Acute Distress] : no acute distress [Well Nourished] : well nourished [Well Developed] : well developed [Well-Appearing] : well-appearing [Normal Sclera/Conjunctiva] : normal sclera/conjunctiva [EOMI] : extraocular movements intact [Normal Outer Ear/Nose] : the outer ears and nose were normal in appearance [Normal Oropharynx] : the oropharynx was normal [Normal TMs] : both tympanic membranes were normal [No JVD] : no jugular venous distention [Supple] : supple [No Lymphadenopathy] : no lymphadenopathy [No Respiratory Distress] : no respiratory distress  [Clear to Auscultation] : lungs were clear to auscultation bilaterally [No Accessory Muscle Use] : no accessory muscle use [Normal Rate] : normal rate  [Regular Rhythm] : with a regular rhythm [Normal S1, S2] : normal S1 and S2 [No Murmur] : no murmur heard [No Carotid Bruits] : no carotid bruits [Pedal Pulses Present] : the pedal pulses are present [No Edema] : there was no peripheral edema [Soft] : abdomen soft [Non Tender] : non-tender [Non-distended] : non-distended [No Masses] : no abdominal mass palpated [No HSM] : no HSM [Normal Bowel Sounds] : normal bowel sounds [Normal Posterior Cervical Nodes] : no posterior cervical lymphadenopathy [Normal Anterior Cervical Nodes] : no anterior cervical lymphadenopathy [No CVA Tenderness] : no CVA  tenderness [No Spinal Tenderness] : no spinal tenderness [No Joint Swelling] : no joint swelling [Grossly Normal Strength/Tone] : grossly normal strength/tone [No Rash] : no rash [Normal Gait] : normal gait [No Focal Deficits] : no focal deficits [Normal Affect] : the affect was normal [Normal Insight/Judgement] : insight and judgment were intact [de-identified] : + left foot tenderness on palpation

## 2018-10-02 NOTE — HISTORY OF PRESENT ILLNESS
[de-identified] : 57 year old male with h/o DM/Hypertension/ Hyperlipidemia / CAD with stents presents for annual physical and follow up on recent URI . Pt feeling better : cough improved  . Pt admits been non compliant with diet and exercise, not compliant with daily home glucose monitoring. Also c/o left foot pain x few days, worse with ambulation, denies recent trauma . \par He denies CP/SOB, dizziness, exertional symptoms.He is following with cardio Dr. Quispe for CAD\par HMS : colonoscopy never had.

## 2018-10-18 ENCOUNTER — APPOINTMENT (OUTPATIENT)
Dept: INTERNAL MEDICINE | Facility: CLINIC | Age: 58
End: 2018-10-18

## 2019-02-11 ENCOUNTER — RX RENEWAL (OUTPATIENT)
Age: 59
End: 2019-02-11

## 2019-02-11 ENCOUNTER — MEDICATION RENEWAL (OUTPATIENT)
Age: 59
End: 2019-02-11

## 2019-02-22 ENCOUNTER — APPOINTMENT (OUTPATIENT)
Dept: INTERNAL MEDICINE | Facility: CLINIC | Age: 59
End: 2019-02-22
Payer: COMMERCIAL

## 2019-02-22 VITALS
WEIGHT: 272 LBS | SYSTOLIC BLOOD PRESSURE: 119 MMHG | OXYGEN SATURATION: 100 % | HEIGHT: 70 IN | TEMPERATURE: 98.4 F | RESPIRATION RATE: 12 BRPM | BODY MASS INDEX: 38.94 KG/M2 | DIASTOLIC BLOOD PRESSURE: 76 MMHG | HEART RATE: 84 BPM

## 2019-02-22 DIAGNOSIS — Z87.09 PERSONAL HISTORY OF OTHER DISEASES OF THE RESPIRATORY SYSTEM: ICD-10-CM

## 2019-02-22 PROCEDURE — 99214 OFFICE O/P EST MOD 30 MIN: CPT

## 2019-02-23 NOTE — REVIEW OF SYSTEMS
[Nasal Discharge] : nasal discharge [Sore Throat] : sore throat [Shortness Of Breath] : no shortness of breath [Wheezing] : no wheezing [Cough] : cough [Negative] : Heme/Lymph

## 2019-02-23 NOTE — HISTORY OF PRESENT ILLNESS
[de-identified] : 58 year old male with h/o DM/ Hypertension/ Hyperlipidemia / MI / CAD with stents presents with acute URI  x 3 weeks  : + cough mostly dry / + associated  nasal congestion, malaise, body aches  . State symptoms getting worse at night and not getting better with OTC decongestant. He denies CP/SOB, N, V, abdominal pain.\par Pt not compliant with regular follow ups and daily home glucose monitoring. Reports fair glycemic control with FBS < 150.\par He denies CP/SOB, dizziness, N, V, abdominal pain..

## 2019-02-23 NOTE — PHYSICAL EXAM
[No Acute Distress] : no acute distress [Well Nourished] : well nourished [Well Developed] : well developed [Well-Appearing] : well-appearing [Normal Sclera/Conjunctiva] : normal sclera/conjunctiva [EOMI] : extraocular movements intact [Normal Outer Ear/Nose] : the outer ears and nose were normal in appearance [Normal TMs] : both tympanic membranes were normal [No JVD] : no jugular venous distention [Supple] : supple [No Lymphadenopathy] : no lymphadenopathy [Thyroid Normal, No Nodules] : the thyroid was normal and there were no nodules present [No Respiratory Distress] : no respiratory distress  [No Accessory Muscle Use] : no accessory muscle use [Normal Rate] : normal rate  [Regular Rhythm] : with a regular rhythm [Normal S1, S2] : normal S1 and S2 [No Murmur] : no murmur heard [No Carotid Bruits] : no carotid bruits [Pedal Pulses Present] : the pedal pulses are present [No Edema] : there was no peripheral edema [Soft] : abdomen soft [Non Tender] : non-tender [Non-distended] : non-distended [No Masses] : no abdominal mass palpated [No HSM] : no HSM [Normal Bowel Sounds] : normal bowel sounds [Normal Posterior Cervical Nodes] : no posterior cervical lymphadenopathy [Normal Anterior Cervical Nodes] : no anterior cervical lymphadenopathy [No CVA Tenderness] : no CVA  tenderness [No Spinal Tenderness] : no spinal tenderness [No Joint Swelling] : no joint swelling [Grossly Normal Strength/Tone] : grossly normal strength/tone [No Rash] : no rash [Normal Gait] : normal gait [Coordination Grossly Intact] : coordination grossly intact [No Focal Deficits] : no focal deficits [Normal Affect] : the affect was normal [Normal Insight/Judgement] : insight and judgment were intact [de-identified] : + throat erythema  [de-identified] : + coarse B.S / + few scattered rhonchi

## 2019-03-01 ENCOUNTER — APPOINTMENT (OUTPATIENT)
Dept: INTERNAL MEDICINE | Facility: CLINIC | Age: 59
End: 2019-03-01
Payer: COMMERCIAL

## 2019-03-01 VITALS
WEIGHT: 278 LBS | RESPIRATION RATE: 12 BRPM | HEIGHT: 70 IN | BODY MASS INDEX: 39.8 KG/M2 | OXYGEN SATURATION: 100 % | TEMPERATURE: 99.1 F | DIASTOLIC BLOOD PRESSURE: 75 MMHG | HEART RATE: 99 BPM | SYSTOLIC BLOOD PRESSURE: 123 MMHG

## 2019-03-01 PROCEDURE — 99214 OFFICE O/P EST MOD 30 MIN: CPT

## 2019-03-02 NOTE — HISTORY OF PRESENT ILLNESS
[de-identified] : 58 year old male with h/o DM/Hypertension/ Hyperlipidemia / CAD with stents/ recent URI/ Bronchitis   presents for follow up . Pt feeling better : cough improved  . Pt admits been non compliant with diet and exercise, not compliant with daily home glucose monitoring. \par He denies CP/SOB, dizziness, exertional symptoms, + weight  gain. .He is following with cardio Dr. Quispe for CAD\par

## 2019-03-02 NOTE — PHYSICAL EXAM
[No Acute Distress] : no acute distress [Well Nourished] : well nourished [Well Developed] : well developed [Well-Appearing] : well-appearing [Normal Sclera/Conjunctiva] : normal sclera/conjunctiva [EOMI] : extraocular movements intact [Normal Outer Ear/Nose] : the outer ears and nose were normal in appearance [Normal Oropharynx] : the oropharynx was normal [Normal TMs] : both tympanic membranes were normal [No JVD] : no jugular venous distention [Supple] : supple [No Lymphadenopathy] : no lymphadenopathy [No Respiratory Distress] : no respiratory distress  [Clear to Auscultation] : lungs were clear to auscultation bilaterally [No Accessory Muscle Use] : no accessory muscle use [Normal Rate] : normal rate  [Regular Rhythm] : with a regular rhythm [Normal S1, S2] : normal S1 and S2 [No Murmur] : no murmur heard [No Carotid Bruits] : no carotid bruits [Pedal Pulses Present] : the pedal pulses are present [No Edema] : there was no peripheral edema [Soft] : abdomen soft [Non Tender] : non-tender [Non-distended] : non-distended [No Masses] : no abdominal mass palpated [No HSM] : no HSM [Normal Bowel Sounds] : normal bowel sounds [Normal Posterior Cervical Nodes] : no posterior cervical lymphadenopathy [Normal Anterior Cervical Nodes] : no anterior cervical lymphadenopathy [No CVA Tenderness] : no CVA  tenderness [No Spinal Tenderness] : no spinal tenderness [No Joint Swelling] : no joint swelling [Grossly Normal Strength/Tone] : grossly normal strength/tone [No Rash] : no rash [Normal Gait] : normal gait [No Focal Deficits] : no focal deficits [Normal Affect] : the affect was normal [Normal Insight/Judgement] : insight and judgment were intact [de-identified] : + left foot tenderness on palpation

## 2019-03-02 NOTE — PLAN
[FreeTextEntry1] : 1. CAD / Hypertension\par ASA/ Brilinta / statins\par  Lisinopril to 20 mg QD\par cardio follow up \par 2. DM\par continue Glipizide/ Metformin / Jardiance \par daily home glucose monitoring \par Hb A1C \par 3. see plan

## 2019-03-06 LAB
ALBUMIN SERPL ELPH-MCNC: 4.5 G/DL
ALP BLD-CCNC: 69 U/L
ALT SERPL-CCNC: 24 U/L
ANION GAP SERPL CALC-SCNC: 18 MMOL/L
APPEARANCE: CLEAR
AST SERPL-CCNC: 18 U/L
BACTERIA: NEGATIVE
BASOPHILS # BLD AUTO: 0.1 K/UL
BASOPHILS NFR BLD AUTO: 1.1 %
BILIRUB SERPL-MCNC: 0.3 MG/DL
BILIRUBIN URINE: NEGATIVE
BLOOD URINE: NEGATIVE
BUN SERPL-MCNC: 28 MG/DL
CALCIUM SERPL-MCNC: 9.4 MG/DL
CHLORIDE SERPL-SCNC: 103 MMOL/L
CHOLEST SERPL-MCNC: 221 MG/DL
CHOLEST/HDLC SERPL: 7.4 RATIO
CO2 SERPL-SCNC: 20 MMOL/L
COLOR: YELLOW
CREAT SERPL-MCNC: 1.34 MG/DL
EOSINOPHIL # BLD AUTO: 0.31 K/UL
EOSINOPHIL NFR BLD AUTO: 3.5 %
GLUCOSE QUALITATIVE U: ABNORMAL
GLUCOSE SERPL-MCNC: 186 MG/DL
HBA1C MFR BLD HPLC: 8.3 %
HCT VFR BLD CALC: 40.9 %
HDLC SERPL-MCNC: 30 MG/DL
HGB BLD-MCNC: 12.8 G/DL
HYALINE CASTS: 4 /LPF
IMM GRANULOCYTES NFR BLD AUTO: 0.2 %
KETONES URINE: NEGATIVE
LDLC SERPL CALC-MCNC: 160 MG/DL
LEUKOCYTE ESTERASE URINE: NEGATIVE
LYMPHOCYTES # BLD AUTO: 2.36 K/UL
LYMPHOCYTES NFR BLD AUTO: 26.5 %
MAN DIFF?: NORMAL
MCHC RBC-ENTMCNC: 28.5 PG
MCHC RBC-ENTMCNC: 31.3 GM/DL
MCV RBC AUTO: 91.1 FL
MICROSCOPIC-UA: NORMAL
MONOCYTES # BLD AUTO: 0.54 K/UL
MONOCYTES NFR BLD AUTO: 6.1 %
NEUTROPHILS # BLD AUTO: 5.57 K/UL
NEUTROPHILS NFR BLD AUTO: 62.6 %
NITRITE URINE: NEGATIVE
PH URINE: 5.5
PLATELET # BLD AUTO: 260 K/UL
POTASSIUM SERPL-SCNC: 4.8 MMOL/L
PROT SERPL-MCNC: 7.2 G/DL
PROTEIN URINE: ABNORMAL
PSA SERPL-MCNC: 0.48 NG/ML
RBC # BLD: 4.49 M/UL
RBC # FLD: 13.4 %
RED BLOOD CELLS URINE: 7 /HPF
SODIUM SERPL-SCNC: 141 MMOL/L
SPECIFIC GRAVITY URINE: 1.03
SQUAMOUS EPITHELIAL CELLS: 3 /HPF
TRIGL SERPL-MCNC: 155 MG/DL
TSH SERPL-ACNC: 0.99 UIU/ML
UROBILINOGEN URINE: NORMAL
WBC # FLD AUTO: 8.9 K/UL
WHITE BLOOD CELLS URINE: 5 /HPF

## 2019-03-14 ENCOUNTER — APPOINTMENT (OUTPATIENT)
Dept: INTERNAL MEDICINE | Facility: CLINIC | Age: 59
End: 2019-03-14
Payer: COMMERCIAL

## 2019-03-14 VITALS
SYSTOLIC BLOOD PRESSURE: 138 MMHG | HEIGHT: 70 IN | WEIGHT: 271 LBS | RESPIRATION RATE: 12 BRPM | HEART RATE: 84 BPM | OXYGEN SATURATION: 100 % | BODY MASS INDEX: 38.8 KG/M2 | TEMPERATURE: 97.9 F | DIASTOLIC BLOOD PRESSURE: 86 MMHG

## 2019-03-14 PROCEDURE — 99214 OFFICE O/P EST MOD 30 MIN: CPT

## 2019-03-15 ENCOUNTER — APPOINTMENT (OUTPATIENT)
Dept: GASTROENTEROLOGY | Facility: CLINIC | Age: 59
End: 2019-03-15

## 2019-03-15 NOTE — PHYSICAL EXAM
[No Acute Distress] : no acute distress [Well Nourished] : well nourished [Well Developed] : well developed [Well-Appearing] : well-appearing [Normal Sclera/Conjunctiva] : normal sclera/conjunctiva [EOMI] : extraocular movements intact [Normal Outer Ear/Nose] : the outer ears and nose were normal in appearance [Normal Oropharynx] : the oropharynx was normal [Normal TMs] : both tympanic membranes were normal [No JVD] : no jugular venous distention [Supple] : supple [No Lymphadenopathy] : no lymphadenopathy [No Respiratory Distress] : no respiratory distress  [Clear to Auscultation] : lungs were clear to auscultation bilaterally [No Accessory Muscle Use] : no accessory muscle use [Normal Rate] : normal rate  [Regular Rhythm] : with a regular rhythm [Normal S1, S2] : normal S1 and S2 [No Murmur] : no murmur heard [No Carotid Bruits] : no carotid bruits [Pedal Pulses Present] : the pedal pulses are present [No Edema] : there was no peripheral edema [Soft] : abdomen soft [Non Tender] : non-tender [Non-distended] : non-distended [No Masses] : no abdominal mass palpated [No HSM] : no HSM [Normal Bowel Sounds] : normal bowel sounds [Normal Posterior Cervical Nodes] : no posterior cervical lymphadenopathy [Normal Anterior Cervical Nodes] : no anterior cervical lymphadenopathy [No CVA Tenderness] : no CVA  tenderness [No Spinal Tenderness] : no spinal tenderness [No Joint Swelling] : no joint swelling [Grossly Normal Strength/Tone] : grossly normal strength/tone [No Rash] : no rash [Normal Gait] : normal gait [No Focal Deficits] : no focal deficits [Normal Affect] : the affect was normal [Normal Insight/Judgement] : insight and judgment were intact [de-identified] : + left foot tenderness on palpation

## 2019-03-15 NOTE — PLAN
[FreeTextEntry1] : 1. CAD / Hypertension\par ASA/ Brilinta / statins\par  Lisinopril to 20 mg QD\par cardio follow up \par 2. DM\par continue Glipizide/ Metformin / Jardiance \par daily home glucose monitoring \par 3. CKD \par nephrology follow up\par will monitor renal function

## 2019-03-15 NOTE — HISTORY OF PRESENT ILLNESS
[de-identified] : 58 year old male with h/o DM/Hypertension/ Hyperlipidemia / CAD with stents/ recent URI/ Bronchitis   presents for follow up  on abn labs. Recent blood work > + poorly controlled DM, + proteinuria, glycosuria, renal insuff . Pt lost 8 lbs since last visit\par He denies CP/SOB, dizziness, exertional symptoms, + weight  gain. \par

## 2019-03-18 ENCOUNTER — APPOINTMENT (OUTPATIENT)
Dept: UROLOGY | Facility: CLINIC | Age: 59
End: 2019-03-18

## 2019-03-27 ENCOUNTER — NON-APPOINTMENT (OUTPATIENT)
Age: 59
End: 2019-03-27

## 2019-03-27 ENCOUNTER — APPOINTMENT (OUTPATIENT)
Dept: CARDIOLOGY | Facility: CLINIC | Age: 59
End: 2019-03-27
Payer: COMMERCIAL

## 2019-03-27 VITALS
BODY MASS INDEX: 38.37 KG/M2 | SYSTOLIC BLOOD PRESSURE: 127 MMHG | DIASTOLIC BLOOD PRESSURE: 89 MMHG | OXYGEN SATURATION: 98 % | RESPIRATION RATE: 14 BRPM | HEIGHT: 70 IN | HEART RATE: 96 BPM | WEIGHT: 268 LBS

## 2019-03-27 PROCEDURE — 93000 ELECTROCARDIOGRAM COMPLETE: CPT

## 2019-03-27 PROCEDURE — 99215 OFFICE O/P EST HI 40 MIN: CPT | Mod: 25

## 2019-03-27 NOTE — DISCUSSION/SUMMARY
[FreeTextEntry1] : The patient is a 58-year-old gentleman a history of diabetes mellitus, hypertension, hyperlipidemia, CAD EF=40% with dyspnea on exertion. He has been noncompliant with f/u.\par #1 CV- dyspnea but euvolemic on exam, continue DAPT, ECHO ordered first\par #2 Htn- on coreg and lisinopril\par #3 Lipids- suboptimal, change to crestor and zetia then reevaluate\par #4 DM- on orals, suboptimal control, We discussed adherence to a low fat low cholesterol, ADA diet, weight loss and regular daily exercise.\par #5 APC- holter today

## 2019-03-27 NOTE — REVIEW OF SYSTEMS
[Shortness Of Breath] : shortness of breath [Dyspnea on exertion] : dyspnea during exertion [Negative] : Heme/Lymph [Recent Weight Gain (___ Lbs)] : no recent weight gain [Chest Pain] : no chest pain [Lower Ext Edema] : no extremity edema [Palpitations] : no palpitations

## 2019-03-27 NOTE — PHYSICAL EXAM
[General Appearance - Well Developed] : well developed [Normal Appearance] : normal appearance [Well Groomed] : well groomed [General Appearance - Well Nourished] : well nourished [No Deformities] : no deformities [General Appearance - In No Acute Distress] : no acute distress [Normal Conjunctiva] : the conjunctiva exhibited no abnormalities [Eyelids - No Xanthelasma] : the eyelids demonstrated no xanthelasmas [Normal Oral Mucosa] : normal oral mucosa [No Oral Pallor] : no oral pallor [No Oral Cyanosis] : no oral cyanosis [Normal Jugular Venous A Waves Present] : normal jugular venous A waves present [Normal Jugular Venous V Waves Present] : normal jugular venous V waves present [No Jugular Venous Parekh A Waves] : no jugular venous parekh A waves [Respiration, Rhythm And Depth] : normal respiratory rhythm and effort [Exaggerated Use Of Accessory Muscles For Inspiration] : no accessory muscle use [Auscultation Breath Sounds / Voice Sounds] : lungs were clear to auscultation bilaterally [Heart Rate And Rhythm] : heart rate and rhythm were normal [Heart Sounds] : normal S1 and S2 [Murmurs] : no murmurs present [Abdomen Soft] : soft [Abdomen Tenderness] : non-tender [Abdomen Mass (___ Cm)] : no abdominal mass palpated [Abnormal Walk] : normal gait [Gait - Sufficient For Exercise Testing] : the gait was sufficient for exercise testing [Nail Clubbing] : no clubbing of the fingernails [Cyanosis, Localized] : no localized cyanosis [Petechial Hemorrhages (___cm)] : no petechial hemorrhages [Skin Color & Pigmentation] : normal skin color and pigmentation [] : no rash [No Venous Stasis] : no venous stasis [Skin Lesions] : no skin lesions [No Skin Ulcers] : no skin ulcer [No Xanthoma] : no  xanthoma was observed [Oriented To Time, Place, And Person] : oriented to person, place, and time [Affect] : the affect was normal [Mood] : the mood was normal [No Anxiety] : not feeling anxious [FreeTextEntry1] : LEft arm cast

## 2019-03-27 NOTE — HISTORY OF PRESENT ILLNESS
[FreeTextEntry1] : Mik hasnot been here in over a year. He knows he has not been eating right. He was lifting weights in the gym and got pain in his chest about a year ago so he stopped altogether. He is compliant with medications. Gets short of breath on stairs or exertion but no chest pain since stopping exercise last year.

## 2019-04-02 ENCOUNTER — NON-APPOINTMENT (OUTPATIENT)
Age: 59
End: 2019-04-02

## 2019-04-05 ENCOUNTER — APPOINTMENT (OUTPATIENT)
Dept: INTERNAL MEDICINE | Facility: CLINIC | Age: 59
End: 2019-04-05
Payer: COMMERCIAL

## 2019-04-05 VITALS
BODY MASS INDEX: 38.37 KG/M2 | RESPIRATION RATE: 14 BRPM | TEMPERATURE: 97.6 F | HEIGHT: 70 IN | WEIGHT: 268 LBS | OXYGEN SATURATION: 97 % | HEART RATE: 98 BPM | SYSTOLIC BLOOD PRESSURE: 120 MMHG | DIASTOLIC BLOOD PRESSURE: 83 MMHG

## 2019-04-05 PROCEDURE — 99214 OFFICE O/P EST MOD 30 MIN: CPT

## 2019-04-05 NOTE — REVIEW OF SYSTEMS
[Dyspnea on Exertion] : dyspnea on exertion [Negative] : Heme/Lymph [Shortness Of Breath] : no shortness of breath [Wheezing] : no wheezing [Cough] : no cough

## 2019-04-05 NOTE — HISTORY OF PRESENT ILLNESS
[de-identified] : 58 year old male with h/o DM/Hypertension/ Hyperlipidemia / CAD with stents    presents for follow up  on chronic problems.  \par He is doing well , lost 4 lbs since last visit. He is following with cardiologist Dr. Quispe for CAD \par He denies CP/SOB, dizziness, exertional symptoms, +PITTS\par He reports good glycemic control at home with FBS  110-140 \par

## 2019-04-05 NOTE — PLAN
[FreeTextEntry1] : 1. CAD / Hypertension\par ASA/ Brilinta / statins\par  Lisinopril  20 mg QD\par cardio follow up \par 2. DM\par continue Glipizide/ Metformin / Jardiance \par daily home glucose monitoring \par 3. CKD \par nephrology follow up\par will monitor renal function\par follow up 1 month

## 2019-04-05 NOTE — PHYSICAL EXAM
[No Acute Distress] : no acute distress [Well Nourished] : well nourished [Well Developed] : well developed [Well-Appearing] : well-appearing [Normal Sclera/Conjunctiva] : normal sclera/conjunctiva [EOMI] : extraocular movements intact [Normal Outer Ear/Nose] : the outer ears and nose were normal in appearance [Normal Oropharynx] : the oropharynx was normal [Normal TMs] : both tympanic membranes were normal [No JVD] : no jugular venous distention [Supple] : supple [No Lymphadenopathy] : no lymphadenopathy [No Respiratory Distress] : no respiratory distress  [Clear to Auscultation] : lungs were clear to auscultation bilaterally [No Accessory Muscle Use] : no accessory muscle use [Normal Rate] : normal rate  [Regular Rhythm] : with a regular rhythm [Normal S1, S2] : normal S1 and S2 [No Murmur] : no murmur heard [No Carotid Bruits] : no carotid bruits [Pedal Pulses Present] : the pedal pulses are present [No Edema] : there was no peripheral edema [Soft] : abdomen soft [Non Tender] : non-tender [Non-distended] : non-distended [No Masses] : no abdominal mass palpated [No HSM] : no HSM [Normal Bowel Sounds] : normal bowel sounds [Normal Posterior Cervical Nodes] : no posterior cervical lymphadenopathy [Normal Anterior Cervical Nodes] : no anterior cervical lymphadenopathy [No CVA Tenderness] : no CVA  tenderness [No Spinal Tenderness] : no spinal tenderness [No Joint Swelling] : no joint swelling [Grossly Normal Strength/Tone] : grossly normal strength/tone [No Rash] : no rash [Normal Gait] : normal gait [No Focal Deficits] : no focal deficits [Normal Affect] : the affect was normal [Normal Insight/Judgement] : insight and judgment were intact [de-identified] : + left foot tenderness on palpation

## 2019-04-08 ENCOUNTER — APPOINTMENT (OUTPATIENT)
Dept: UROLOGY | Facility: CLINIC | Age: 59
End: 2019-04-08
Payer: COMMERCIAL

## 2019-04-08 VITALS
SYSTOLIC BLOOD PRESSURE: 121 MMHG | BODY MASS INDEX: 38.08 KG/M2 | HEART RATE: 97 BPM | DIASTOLIC BLOOD PRESSURE: 79 MMHG | HEIGHT: 70 IN | WEIGHT: 266 LBS | RESPIRATION RATE: 12 BRPM

## 2019-04-08 PROCEDURE — 99214 OFFICE O/P EST MOD 30 MIN: CPT

## 2019-04-09 NOTE — ASSESSMENT
[FreeTextEntry1] : Very pleasnt 58-year-old gentleman with microscopic hematuria, ED\par -urinalysis reviewed\par -CT Urogram\par -cystoscopy\par -Extensive discussion of the potential etiologies of microscopic hematuria, as well as the need to complete full work up.  Patient understands and wishes to proceed.

## 2019-04-09 NOTE — HISTORY OF PRESENT ILLNESS
[FreeTextEntry1] : Very pleasant 58 year -year old gentleman who presents for follow up of ED and evaluation of microscopic hematuria found on urinalysis approximately 1 month ago. Patient reports no history of gross hematuria.  No flank pain. No suprapubic pain. No dysuria.  No urinary frequency, urgency.  Nocturia x 0-1. No history of urinary tract infections or renal impairment. No aggravating or alleviating factors that he knows of contributing to hematuria.\par \par No family history of  malignancy, including prostate cancer.  No family history of nephrolithiasis.\par  [Urinary Retention] : no urinary retention [Urinary Urgency] : no urinary urgency [Urinary Frequency] : no urinary frequency [Nocturia] : no nocturia [Straining] : no straining [Erectile Dysfunction] : Erectile Dysfunction [Dysuria] : no dysuria [Hematuria - Gross] : no gross hematuria [Bladder Spasm] : no bladder spasm [Abdominal Pain] : no abdominal pain [Flank Pain] : no flank pain [Fever] : no fever [Fatigue] : no fatigue [Anorexia] : no anorexia [Nausea] : no nausea

## 2019-04-09 NOTE — PHYSICAL EXAM
[General Appearance - Well Nourished] : well nourished [General Appearance - Well Developed] : well developed [Normal Appearance] : normal appearance [Well Groomed] : well groomed [Abdomen Soft] : soft [General Appearance - In No Acute Distress] : no acute distress [Abdomen Tenderness] : non-tender [Costovertebral Angle Tenderness] : no ~M costovertebral angle tenderness [Urinary Bladder Findings] : the bladder was normal on palpation [Edema] : no peripheral edema [] : no respiratory distress [Respiration, Rhythm And Depth] : normal respiratory rhythm and effort [Exaggerated Use Of Accessory Muscles For Inspiration] : no accessory muscle use [Affect] : the affect was normal [Mood] : the mood was normal [Oriented To Time, Place, And Person] : oriented to person, place, and time [Not Anxious] : not anxious [Normal Station and Gait] : the gait and station were normal for the patient's age [No Focal Deficits] : no focal deficits [No Palpable Adenopathy] : no palpable adenopathy

## 2019-04-11 ENCOUNTER — APPOINTMENT (OUTPATIENT)
Dept: INTERNAL MEDICINE | Facility: CLINIC | Age: 59
End: 2019-04-11

## 2019-04-22 ENCOUNTER — MEDICATION RENEWAL (OUTPATIENT)
Age: 59
End: 2019-04-22

## 2019-05-01 ENCOUNTER — APPOINTMENT (OUTPATIENT)
Dept: CARDIOLOGY | Facility: CLINIC | Age: 59
End: 2019-05-01
Payer: COMMERCIAL

## 2019-05-01 ENCOUNTER — NON-APPOINTMENT (OUTPATIENT)
Age: 59
End: 2019-05-01

## 2019-05-01 VITALS
HEIGHT: 70 IN | OXYGEN SATURATION: 97 % | WEIGHT: 278 LBS | BODY MASS INDEX: 39.8 KG/M2 | RESPIRATION RATE: 12 BRPM | TEMPERATURE: 98.5 F | SYSTOLIC BLOOD PRESSURE: 131 MMHG | HEART RATE: 91 BPM | DIASTOLIC BLOOD PRESSURE: 78 MMHG

## 2019-05-01 PROCEDURE — 99214 OFFICE O/P EST MOD 30 MIN: CPT | Mod: 25

## 2019-05-01 NOTE — HISTORY OF PRESENT ILLNESS
[FreeTextEntry1] : Mik did a lot of walking in [place] but feels very bloated and gained a lot of weight despite not eating much.

## 2019-05-01 NOTE — REVIEW OF SYSTEMS
[Shortness Of Breath] : shortness of breath [Dyspnea on exertion] : dyspnea during exertion [Negative] : Heme/Lymph [Recent Weight Gain (___ Lbs)] : recent [unfilled] ~Ulb weight gain [Chest Pain] : no chest pain [Lower Ext Edema] : no extremity edema [Palpitations] : no palpitations

## 2019-05-01 NOTE — DISCUSSION/SUMMARY
[FreeTextEntry1] : The patient is a 58-year-old gentleman a history of diabetes mellitus, hypertension, hyperlipidemia, CAD EF=40% with 12 pound weight gain in one month\par #1 CV- dyspnea but euvolemic on exam, continue DAPT, ECHO ordered, lasix 40mg daily for a few days\par #2 Htn- on coreg and lisinopril\par #3 Lipids- suboptimal, change to crestor and zetia then reevaluate\par #4 DM- on orals, suboptimal control, We discussed adherence to a low fat low cholesterol, ADA diet, weight loss and regular daily exercise.\par #5 APC- holter only APC no atach

## 2019-05-01 NOTE — PHYSICAL EXAM
[General Appearance - Well Developed] : well developed [Normal Appearance] : normal appearance [Well Groomed] : well groomed [General Appearance - Well Nourished] : well nourished [General Appearance - In No Acute Distress] : no acute distress [No Deformities] : no deformities [Eyelids - No Xanthelasma] : the eyelids demonstrated no xanthelasmas [Normal Conjunctiva] : the conjunctiva exhibited no abnormalities [Normal Oral Mucosa] : normal oral mucosa [No Oral Pallor] : no oral pallor [No Oral Cyanosis] : no oral cyanosis [Normal Jugular Venous V Waves Present] : normal jugular venous V waves present [Normal Jugular Venous A Waves Present] : normal jugular venous A waves present [No Jugular Venous Parekh A Waves] : no jugular venous parekh A waves [Respiration, Rhythm And Depth] : normal respiratory rhythm and effort [Exaggerated Use Of Accessory Muscles For Inspiration] : no accessory muscle use [Auscultation Breath Sounds / Voice Sounds] : lungs were clear to auscultation bilaterally [Heart Rate And Rhythm] : heart rate and rhythm were normal [Heart Sounds] : normal S1 and S2 [Murmurs] : no murmurs present [Abdomen Tenderness] : non-tender [Abdomen Soft] : soft [Abdomen Mass (___ Cm)] : no abdominal mass palpated [Abnormal Walk] : normal gait [Gait - Sufficient For Exercise Testing] : the gait was sufficient for exercise testing [Nail Clubbing] : no clubbing of the fingernails [Cyanosis, Localized] : no localized cyanosis [Petechial Hemorrhages (___cm)] : no petechial hemorrhages [Skin Color & Pigmentation] : normal skin color and pigmentation [] : no rash [No Venous Stasis] : no venous stasis [Skin Lesions] : no skin lesions [No Skin Ulcers] : no skin ulcer [No Xanthoma] : no  xanthoma was observed [Oriented To Time, Place, And Person] : oriented to person, place, and time [Affect] : the affect was normal [No Anxiety] : not feeling anxious [Mood] : the mood was normal [FreeTextEntry1] : LEft arm cast

## 2019-05-03 ENCOUNTER — MED ADMIN CHARGE (OUTPATIENT)
Age: 59
End: 2019-05-03

## 2019-05-03 ENCOUNTER — APPOINTMENT (OUTPATIENT)
Dept: UROLOGY | Facility: CLINIC | Age: 59
End: 2019-05-03

## 2019-05-10 ENCOUNTER — APPOINTMENT (OUTPATIENT)
Dept: INTERNAL MEDICINE | Facility: CLINIC | Age: 59
End: 2019-05-10

## 2019-05-15 ENCOUNTER — APPOINTMENT (OUTPATIENT)
Dept: UROLOGY | Facility: CLINIC | Age: 59
End: 2019-05-15

## 2019-07-17 ENCOUNTER — APPOINTMENT (OUTPATIENT)
Dept: CARDIOLOGY | Facility: CLINIC | Age: 59
End: 2019-07-17

## 2019-09-13 NOTE — PACU DISCHARGE NOTE - AIRWAY PATENCY:
Pt was looking at his labs and noticed that his tryglicerides were still high.  He wants to know if he can start fenobibrate.  Please advise.   Satisfactory

## 2019-09-30 ENCOUNTER — RX RENEWAL (OUTPATIENT)
Age: 59
End: 2019-09-30

## 2019-12-20 ENCOUNTER — APPOINTMENT (OUTPATIENT)
Dept: INTERNAL MEDICINE | Facility: CLINIC | Age: 59
End: 2019-12-20
Payer: COMMERCIAL

## 2019-12-20 VITALS
TEMPERATURE: 98.1 F | OXYGEN SATURATION: 96 % | RESPIRATION RATE: 12 BRPM | HEIGHT: 70 IN | BODY MASS INDEX: 38.8 KG/M2 | SYSTOLIC BLOOD PRESSURE: 137 MMHG | WEIGHT: 271 LBS | DIASTOLIC BLOOD PRESSURE: 86 MMHG | HEART RATE: 98 BPM

## 2019-12-20 PROCEDURE — 99214 OFFICE O/P EST MOD 30 MIN: CPT

## 2019-12-20 NOTE — PLAN
[FreeTextEntry1] : 1. URI/ Cough\par see plan\par Mucinex prn \par 2. CAD / Hypertension\par ASA/ Brilinta / statins\par  Lisinopril  20 mg QD\par cardio follow up \par 3. DM\par continue Glipizide/ Metformin / Jardiance \par daily home glucose monitoring \par follow up 1 week\par pt instructed to go to ED if fever, or worsening of symptoms \par

## 2019-12-20 NOTE — COUNSELING
Speech Pathology:     Per RN report: pt with coughing/choking even on nectar-thick liquids. Pt also continues with limited intake. Will order MBS to further assess oropharyngeal swallow fxn and r/o aspiration.      Thank you for this referral.    Daljit Brown M.S. CCC-SLP/L  Speech-Language Pathologist [Weight management counseling provided] : Weight management [Healthy eating counseling provided] : healthy eating [Activity counseling provided] : activity

## 2019-12-20 NOTE — PHYSICAL EXAM
[No Acute Distress] : no acute distress [Well Nourished] : well nourished [Well Developed] : well developed [Normal Sclera/Conjunctiva] : normal sclera/conjunctiva [Ill-Appearing] : ill-appearing [Normal Outer Ear/Nose] : the outer ears and nose were normal in appearance [EOMI] : extraocular movements intact [Normal TMs] : both tympanic membranes were normal [No JVD] : no jugular venous distention [No Lymphadenopathy] : no lymphadenopathy [No Respiratory Distress] : no respiratory distress  [Supple] : supple [No Accessory Muscle Use] : no accessory muscle use [Normal Rate] : normal rate  [Normal S1, S2] : normal S1 and S2 [Regular Rhythm] : with a regular rhythm [No Murmur] : no murmur heard [No Carotid Bruits] : no carotid bruits [Pedal Pulses Present] : the pedal pulses are present [Soft] : abdomen soft [No Edema] : there was no peripheral edema [Non Tender] : non-tender [Normal Bowel Sounds] : normal bowel sounds [No HSM] : no HSM [No CVA Tenderness] : no CVA  tenderness [Normal Anterior Cervical Nodes] : no anterior cervical lymphadenopathy [Normal Posterior Cervical Nodes] : no posterior cervical lymphadenopathy [No Joint Swelling] : no joint swelling [No Spinal Tenderness] : no spinal tenderness [Grossly Normal Strength/Tone] : grossly normal strength/tone [No Rash] : no rash [Normal Gait] : normal gait [No Focal Deficits] : no focal deficits [Normal Affect] : the affect was normal [Normal Insight/Judgement] : insight and judgment were intact [de-identified] : + throat erythema/ + postanasa drip [de-identified] : + few scattered rhonchi  [de-identified] : + left foot tenderness on palpation

## 2019-12-20 NOTE — HISTORY OF PRESENT ILLNESS
[de-identified] : 59  year old male with h/o DM/Hypertension/ Hyperlipidemia / CAD with stents    presents with acute URI since Monday . Pt c/o productive cough of yellow sputum, runny nose, denies associated fever, chills, + malaise. Symptoms getting worse at night. He takes OTC decongestant without much improvement. \par He is not compliant with home glucose monitoring \par He denies CP/SOB, dizziness, +PITTS\par \par

## 2019-12-26 ENCOUNTER — APPOINTMENT (OUTPATIENT)
Dept: INTERNAL MEDICINE | Facility: CLINIC | Age: 59
End: 2019-12-26
Payer: SELF-PAY

## 2019-12-26 VITALS
WEIGHT: 264 LBS | HEART RATE: 96 BPM | SYSTOLIC BLOOD PRESSURE: 128 MMHG | DIASTOLIC BLOOD PRESSURE: 76 MMHG | RESPIRATION RATE: 12 BRPM | TEMPERATURE: 98 F | HEIGHT: 70 IN | BODY MASS INDEX: 37.8 KG/M2 | OXYGEN SATURATION: 96 %

## 2019-12-26 PROCEDURE — 99214 OFFICE O/P EST MOD 30 MIN: CPT

## 2020-01-05 NOTE — PHYSICAL EXAM
[No Acute Distress] : no acute distress [Well Nourished] : well nourished [Well-Appearing] : well-appearing [Normal Sclera/Conjunctiva] : normal sclera/conjunctiva [Well Developed] : well developed [Normal Outer Ear/Nose] : the outer ears and nose were normal in appearance [EOMI] : extraocular movements intact [Normal Oropharynx] : the oropharynx was normal [No JVD] : no jugular venous distention [Normal TMs] : both tympanic membranes were normal [No Respiratory Distress] : no respiratory distress  [No Lymphadenopathy] : no lymphadenopathy [Supple] : supple [No Accessory Muscle Use] : no accessory muscle use [Normal Rate] : normal rate  [Clear to Auscultation] : lungs were clear to auscultation bilaterally [Regular Rhythm] : with a regular rhythm [No Murmur] : no murmur heard [Normal S1, S2] : normal S1 and S2 [No Edema] : there was no peripheral edema [No Carotid Bruits] : no carotid bruits [Pedal Pulses Present] : the pedal pulses are present [Non Tender] : non-tender [Soft] : abdomen soft [Non-distended] : non-distended [No Masses] : no abdominal mass palpated [Normal Posterior Cervical Nodes] : no posterior cervical lymphadenopathy [Normal Bowel Sounds] : normal bowel sounds [No HSM] : no HSM [Normal Anterior Cervical Nodes] : no anterior cervical lymphadenopathy [No Spinal Tenderness] : no spinal tenderness [No CVA Tenderness] : no CVA  tenderness [No Joint Swelling] : no joint swelling [Grossly Normal Strength/Tone] : grossly normal strength/tone [Normal Gait] : normal gait [No Rash] : no rash [No Focal Deficits] : no focal deficits [Normal Affect] : the affect was normal [Normal Insight/Judgement] : insight and judgment were intact [de-identified] : + left foot tenderness on palpation

## 2020-01-05 NOTE — HISTORY OF PRESENT ILLNESS
[de-identified] : 59  year old male with h/o DM/Hypertension/ Hyperlipidemia / CAD with stents    presents for follow up  on chronic problems and recent URI . Pt feeling better, URI symptoms improved. \par He is not compliant with diet/ exercise  He is following with cardiologist Dr. Quispe for CAD \par He denies CP/SOB, dizziness, exertional symptoms, +PITTS\par He reports poor  glycemic control at home with FBS  180-200\par

## 2020-01-05 NOTE — PLAN
[FreeTextEntry1] : 1. CAD / Hypertension\par ASA/ Brilinta / statins\par  Lisinopril  20 mg QD\par cardio follow up \par 2. DM\par continue Glipizide/ Metformin / Jardiance \par add Onglyza 2.5 mg QD \par daily home glucose monitoring \par 3. CKD \par nephrology follow up\par will monitor renal function\par follow up 1 month

## 2020-01-24 ENCOUNTER — APPOINTMENT (OUTPATIENT)
Dept: GASTROENTEROLOGY | Facility: CLINIC | Age: 60
End: 2020-01-24

## 2020-02-04 NOTE — ASU PATIENT PROFILE, ADULT - ABLE TO REACH PT
yes [FreeTextEntry1] : [facility] Hematopathology Report (12/11/19): Normocellular marrow (40-50%) with slightly erythroid predominant trilineage hematopoiesis. 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

## 2020-03-04 ENCOUNTER — APPOINTMENT (OUTPATIENT)
Dept: INTERNAL MEDICINE | Facility: CLINIC | Age: 60
End: 2020-03-04
Payer: COMMERCIAL

## 2020-03-04 VITALS
WEIGHT: 266 LBS | HEIGHT: 70 IN | OXYGEN SATURATION: 97 % | SYSTOLIC BLOOD PRESSURE: 106 MMHG | HEART RATE: 78 BPM | BODY MASS INDEX: 38.08 KG/M2 | DIASTOLIC BLOOD PRESSURE: 66 MMHG | TEMPERATURE: 98.4 F | RESPIRATION RATE: 12 BRPM

## 2020-03-04 PROCEDURE — 99214 OFFICE O/P EST MOD 30 MIN: CPT

## 2020-03-05 RX ORDER — FLUTICASONE PROPIONATE 50 UG/1
50 SPRAY, METERED NASAL DAILY
Qty: 1 | Refills: 0 | Status: DISCONTINUED | COMMUNITY
Start: 2018-09-20 | End: 2020-03-05

## 2020-03-05 RX ORDER — LEVOFLOXACIN 500 MG/1
500 TABLET, FILM COATED ORAL
Qty: 7 | Refills: 0 | Status: DISCONTINUED | COMMUNITY
Start: 2019-02-22 | End: 2020-03-05

## 2020-03-05 RX ORDER — FLUTICASONE PROPIONATE 50 UG/1
50 SPRAY, METERED NASAL DAILY
Qty: 1 | Refills: 0 | Status: DISCONTINUED | COMMUNITY
Start: 2019-12-20 | End: 2020-03-05

## 2020-03-05 RX ORDER — DOXYCYCLINE HYCLATE 100 MG/1
100 TABLET ORAL
Qty: 14 | Refills: 0 | Status: DISCONTINUED | COMMUNITY
Start: 2019-12-20 | End: 2020-03-05

## 2020-03-05 NOTE — PLAN
[FreeTextEntry1] : 1. CAD / Hypertension\par ASA/ Brilinta / statins\par  Lisinopril  20 mg QD\par cardio follow up \par 2. DM\par continue Glipizide/ Metformin \par Onglyza 2.5 mg QD \par daily home glucose monitoring \par 3. CKD \par nephrology follow up\par will monitor renal function\par follow up 1 month

## 2020-03-05 NOTE — HISTORY OF PRESENT ILLNESS
[de-identified] : 59  year old male with h/o DM/Hypertension/ Hyperlipidemia / CAD with stents  2016  presents for follow up  on chronic problems .\par He is undergoing dental procedure > toot extraction and need medical clearance prior to procedure. \par   He is following with cardiologist Dr. Quispe for CAD . \par He denies CP/SOB, dizziness, exertional symptoms, +PITTS\par He reports fair   glycemic control at home with FBS  160 \par

## 2020-03-18 ENCOUNTER — TRANSCRIPTION ENCOUNTER (OUTPATIENT)
Age: 60
End: 2020-03-18

## 2020-03-30 DIAGNOSIS — R79.89 OTHER SPECIFIED ABNORMAL FINDINGS OF BLOOD CHEMISTRY: ICD-10-CM

## 2020-03-30 LAB
25(OH)D3 SERPL-MCNC: 25.3 NG/ML
ALBUMIN SERPL ELPH-MCNC: 4.6 G/DL
ALP BLD-CCNC: 67 U/L
ALT SERPL-CCNC: 20 U/L
ANION GAP SERPL CALC-SCNC: 15 MMOL/L
APPEARANCE: ABNORMAL
AST SERPL-CCNC: 17 U/L
BACTERIA: NEGATIVE
BASOPHILS # BLD AUTO: 0.08 K/UL
BASOPHILS NFR BLD AUTO: 0.9 %
BILIRUB SERPL-MCNC: 0.3 MG/DL
BILIRUBIN URINE: NEGATIVE
BLOOD URINE: NORMAL
BUN SERPL-MCNC: 25 MG/DL
CALCIUM SERPL-MCNC: 9.5 MG/DL
CHLORIDE SERPL-SCNC: 102 MMOL/L
CHOLEST SERPL-MCNC: 115 MG/DL
CHOLEST/HDLC SERPL: 4 RATIO
CO2 SERPL-SCNC: 23 MMOL/L
COLOR: YELLOW
CREAT SERPL-MCNC: 1.51 MG/DL
EOSINOPHIL # BLD AUTO: 0.11 K/UL
EOSINOPHIL NFR BLD AUTO: 1.2 %
ESTIMATED AVERAGE GLUCOSE: 206 MG/DL
GLUCOSE QUALITATIVE U: ABNORMAL
GLUCOSE SERPL-MCNC: 196 MG/DL
HBA1C MFR BLD HPLC: 8.8 %
HCT VFR BLD CALC: 39.7 %
HDLC SERPL-MCNC: 29 MG/DL
HGB BLD-MCNC: 12.5 G/DL
HYALINE CASTS: 0 /LPF
IMM GRANULOCYTES NFR BLD AUTO: 0.3 %
KETONES URINE: NEGATIVE
LDLC SERPL CALC-MCNC: 53 MG/DL
LEUKOCYTE ESTERASE URINE: NEGATIVE
LYMPHOCYTES # BLD AUTO: 2.34 K/UL
LYMPHOCYTES NFR BLD AUTO: 25.7 %
MAN DIFF?: NORMAL
MCHC RBC-ENTMCNC: 27.7 PG
MCHC RBC-ENTMCNC: 31.5 GM/DL
MCV RBC AUTO: 88 FL
MICROSCOPIC-UA: NORMAL
MONOCYTES # BLD AUTO: 0.47 K/UL
MONOCYTES NFR BLD AUTO: 5.2 %
NEUTROPHILS # BLD AUTO: 6.09 K/UL
NEUTROPHILS NFR BLD AUTO: 66.7 %
NITRITE URINE: NEGATIVE
PH URINE: 5.5
PLATELET # BLD AUTO: 264 K/UL
POTASSIUM SERPL-SCNC: 4.5 MMOL/L
PROT SERPL-MCNC: 7 G/DL
PROTEIN URINE: ABNORMAL
PSA SERPL-MCNC: 0.42 NG/ML
RBC # BLD: 4.51 M/UL
RBC # FLD: 14.1 %
RED BLOOD CELLS URINE: 4 /HPF
SODIUM SERPL-SCNC: 140 MMOL/L
SPECIFIC GRAVITY URINE: 1.03
SQUAMOUS EPITHELIAL CELLS: 2 /HPF
TRIGL SERPL-MCNC: 164 MG/DL
TSH SERPL-ACNC: 1.07 UIU/ML
UROBILINOGEN URINE: NORMAL
VIT B12 SERPL-MCNC: 388 PG/ML
WBC # FLD AUTO: 9.12 K/UL
WHITE BLOOD CELLS URINE: 2 /HPF

## 2020-05-08 ENCOUNTER — APPOINTMENT (OUTPATIENT)
Dept: INTERNAL MEDICINE | Facility: CLINIC | Age: 60
End: 2020-05-08
Payer: COMMERCIAL

## 2020-05-08 VITALS
HEART RATE: 92 BPM | BODY MASS INDEX: 37.51 KG/M2 | DIASTOLIC BLOOD PRESSURE: 73 MMHG | SYSTOLIC BLOOD PRESSURE: 136 MMHG | HEIGHT: 70 IN | OXYGEN SATURATION: 98 % | WEIGHT: 262 LBS | RESPIRATION RATE: 12 BRPM | TEMPERATURE: 98.8 F

## 2020-05-08 PROCEDURE — 99214 OFFICE O/P EST MOD 30 MIN: CPT

## 2020-05-11 NOTE — PLAN
[FreeTextEntry1] : \par 1. Cough\par Rx Zpack\par Rx Tessalon Perles, advised pt to take as needed for cough\par Rx Ventolin Inhaler\par Discussed symptomatic supportive care measures\par 2. DM/ CAD \par continue current meds

## 2020-05-11 NOTE — HISTORY OF PRESENT ILLNESS
[de-identified] : \par 59 year old male with h/o DM/Hypertension/ Hyperlipidemia / CAD with stents 2016 presents for an acute visit with a dry, persistent cough for the past two weeks. He denies any associated symptoms, any sore throat, fever/chills, shortness of breath, difficulty breathing, headaches/dizziness, N/V/ abdominal pain. He has not had any known exposure to COVID-19.\par He reports good glycemic control at home with FBS < 140

## 2020-05-11 NOTE — PHYSICAL EXAM
[No Acute Distress] : no acute distress [Well Nourished] : well nourished [Well Developed] : well developed [Well-Appearing] : well-appearing [Normal Sclera/Conjunctiva] : normal sclera/conjunctiva [PERRL] : pupils equal round and reactive to light [EOMI] : extraocular movements intact [Normal Outer Ear/Nose] : the outer ears and nose were normal in appearance [Normal Oropharynx] : the oropharynx was normal [Supple] : supple [No Accessory Muscle Use] : no accessory muscle use [No Respiratory Distress] : no respiratory distress  [Clear to Auscultation] : lungs were clear to auscultation bilaterally [Normal Rate] : normal rate  [Regular Rhythm] : with a regular rhythm [Normal S1, S2] : normal S1 and S2 [No Edema] : there was no peripheral edema [Soft] : abdomen soft [Non Tender] : non-tender [No Masses] : no abdominal mass palpated [Normal Anterior Cervical Nodes] : no anterior cervical lymphadenopathy [Normal Posterior Cervical Nodes] : no posterior cervical lymphadenopathy [No Spinal Tenderness] : no spinal tenderness [No Joint Swelling] : no joint swelling [Grossly Normal Strength/Tone] : grossly normal strength/tone [No Rash] : no rash [Coordination Grossly Intact] : coordination grossly intact [No Focal Deficits] : no focal deficits [Normal Gait] : normal gait [Normal Affect] : the affect was normal [Normal Insight/Judgement] : insight and judgment were intact [Normal TMs] : both tympanic membranes were normal

## 2020-05-14 LAB
SARS-COV-2 IGG SERPL IA-ACNC: 0.1 INDEX
SARS-COV-2 IGG SERPL QL IA: NEGATIVE

## 2020-05-22 ENCOUNTER — APPOINTMENT (OUTPATIENT)
Dept: INTERNAL MEDICINE | Facility: CLINIC | Age: 60
End: 2020-05-22

## 2020-06-16 NOTE — ASU DISCHARGE PLAN (ADULT/PEDIATRIC). - I HAVE READ AND UNDERSTAND THE ABOVE INSTRUCTIONS AND I UNDERSTAND IT IS IMPORTANT TO FOLLOW THESE INSTRUCTIONS
Quality 226: Preventive Care And Screening: Tobacco Use: Screening And Cessation Intervention: Patient screened for tobacco use and is an ex/non-smoker Quality 130: Documentation Of Current Medications In The Medical Record: Current Medications Documented Detail Level: Detailed English Statement Selected

## 2020-10-27 ENCOUNTER — APPOINTMENT (OUTPATIENT)
Dept: INTERNAL MEDICINE | Facility: CLINIC | Age: 60
End: 2020-10-27
Payer: COMMERCIAL

## 2020-10-27 VITALS
RESPIRATION RATE: 14 BRPM | HEIGHT: 70 IN | SYSTOLIC BLOOD PRESSURE: 137 MMHG | HEART RATE: 89 BPM | TEMPERATURE: 97.1 F | BODY MASS INDEX: 36.15 KG/M2 | DIASTOLIC BLOOD PRESSURE: 80 MMHG | WEIGHT: 252.5 LBS | OXYGEN SATURATION: 98 %

## 2020-10-27 DIAGNOSIS — Z23 ENCOUNTER FOR IMMUNIZATION: ICD-10-CM

## 2020-10-27 PROCEDURE — 90682 RIV4 VACC RECOMBINANT DNA IM: CPT

## 2020-10-27 PROCEDURE — 99072 ADDL SUPL MATRL&STAF TM PHE: CPT

## 2020-10-27 PROCEDURE — 99396 PREV VISIT EST AGE 40-64: CPT | Mod: 25

## 2020-10-27 PROCEDURE — G0008: CPT

## 2020-10-27 RX ORDER — BENZONATATE 100 MG/1
100 CAPSULE ORAL
Qty: 20 | Refills: 0 | Status: DISCONTINUED | COMMUNITY
Start: 2020-05-08 | End: 2020-10-27

## 2020-10-27 RX ORDER — AZITHROMYCIN 250 MG/1
250 TABLET, FILM COATED ORAL
Qty: 1 | Refills: 0 | Status: DISCONTINUED | COMMUNITY
Start: 2020-05-08 | End: 2020-10-27

## 2020-10-27 NOTE — HISTORY OF PRESENT ILLNESS
[de-identified] : 59  year old male with h/o DM/Hypertension/ Hyperlipidemia / CAD with stents    presents for annual physical  \par He is not compliant with diet/ exercise  He is following with cardiologist Dr. Quispe for CAD \par He denies CP/SOB, dizziness, exertional symptoms\par He reports fair   glycemic control at home with FBS  140-180\par last colonoscopy 2019\par

## 2020-10-28 LAB
25(OH)D3 SERPL-MCNC: 31.8 NG/ML
ALBUMIN SERPL ELPH-MCNC: 4.5 G/DL
ALP BLD-CCNC: 78 U/L
ALT SERPL-CCNC: 18 U/L
ANION GAP SERPL CALC-SCNC: 13 MMOL/L
APPEARANCE: CLEAR
AST SERPL-CCNC: 18 U/L
BACTERIA: NEGATIVE
BASOPHILS # BLD AUTO: 0.09 K/UL
BASOPHILS NFR BLD AUTO: 0.9 %
BILIRUB SERPL-MCNC: 0.4 MG/DL
BILIRUBIN URINE: NEGATIVE
BLOOD URINE: NEGATIVE
BUN SERPL-MCNC: 21 MG/DL
CALCIUM SERPL-MCNC: 9.7 MG/DL
CHLORIDE SERPL-SCNC: 101 MMOL/L
CHOLEST SERPL-MCNC: 91 MG/DL
CO2 SERPL-SCNC: 24 MMOL/L
COLOR: YELLOW
CREAT SERPL-MCNC: 1.44 MG/DL
EOSINOPHIL # BLD AUTO: 0.24 K/UL
EOSINOPHIL NFR BLD AUTO: 2.4 %
ESTIMATED AVERAGE GLUCOSE: 212 MG/DL
GLUCOSE QUALITATIVE U: ABNORMAL
GLUCOSE SERPL-MCNC: 216 MG/DL
GRANULAR CASTS: 8 /LPF
HBA1C MFR BLD HPLC: 9 %
HCT VFR BLD CALC: 41.4 %
HDLC SERPL-MCNC: 29 MG/DL
HGB BLD-MCNC: 12.8 G/DL
HYALINE CASTS: 8 /LPF
IMM GRANULOCYTES NFR BLD AUTO: 0.3 %
KETONES URINE: NORMAL
LDLC SERPL CALC-MCNC: 27 MG/DL
LEUKOCYTE ESTERASE URINE: NEGATIVE
LYMPHOCYTES # BLD AUTO: 2.82 K/UL
LYMPHOCYTES NFR BLD AUTO: 28.2 %
MAN DIFF?: NORMAL
MCHC RBC-ENTMCNC: 28.1 PG
MCHC RBC-ENTMCNC: 30.9 GM/DL
MCV RBC AUTO: 91 FL
MICROSCOPIC-UA: NORMAL
MONOCYTES # BLD AUTO: 0.7 K/UL
MONOCYTES NFR BLD AUTO: 7 %
NEUTROPHILS # BLD AUTO: 6.13 K/UL
NEUTROPHILS NFR BLD AUTO: 61.2 %
NITRITE URINE: NEGATIVE
NONHDLC SERPL-MCNC: 63 MG/DL
PH URINE: 5.5
PLATELET # BLD AUTO: 308 K/UL
POTASSIUM SERPL-SCNC: 4.5 MMOL/L
PROT SERPL-MCNC: 7.3 G/DL
PROTEIN URINE: ABNORMAL
PSA SERPL-MCNC: 0.36 NG/ML
RBC # BLD: 4.55 M/UL
RBC # FLD: 13.2 %
RED BLOOD CELLS URINE: 2 /HPF
SODIUM SERPL-SCNC: 138 MMOL/L
SPECIFIC GRAVITY URINE: 1.03
SQUAMOUS EPITHELIAL CELLS: 2 /HPF
TRIGL SERPL-MCNC: 179 MG/DL
TSH SERPL-ACNC: 1.05 UIU/ML
UROBILINOGEN URINE: ABNORMAL
VIT B12 SERPL-MCNC: 467 PG/ML
WBC # FLD AUTO: 10.01 K/UL
WHITE BLOOD CELLS URINE: 1 /HPF

## 2020-10-30 ENCOUNTER — APPOINTMENT (OUTPATIENT)
Dept: INTERNAL MEDICINE | Facility: CLINIC | Age: 60
End: 2020-10-30

## 2020-12-16 PROBLEM — Z87.09 HISTORY OF INFLUENZA: Status: RESOLVED | Noted: 2018-01-25 | Resolved: 2020-12-16

## 2020-12-16 PROBLEM — J06.9 ACUTE URI: Status: RESOLVED | Noted: 2017-12-29 | Resolved: 2020-12-16

## 2020-12-21 PROBLEM — Z87.09 HISTORY OF ACUTE BRONCHITIS: Status: RESOLVED | Noted: 2019-02-22 | Resolved: 2020-12-21

## 2020-12-22 ENCOUNTER — RX RENEWAL (OUTPATIENT)
Age: 60
End: 2020-12-22

## 2021-04-28 ENCOUNTER — APPOINTMENT (OUTPATIENT)
Dept: INTERNAL MEDICINE | Facility: CLINIC | Age: 61
End: 2021-04-28
Payer: COMMERCIAL

## 2021-04-28 VITALS
HEART RATE: 86 BPM | DIASTOLIC BLOOD PRESSURE: 88 MMHG | HEIGHT: 70 IN | BODY MASS INDEX: 35.65 KG/M2 | OXYGEN SATURATION: 99 % | TEMPERATURE: 96.9 F | RESPIRATION RATE: 12 BRPM | SYSTOLIC BLOOD PRESSURE: 138 MMHG | WEIGHT: 249 LBS

## 2021-04-28 DIAGNOSIS — M54.5 LOW BACK PAIN: ICD-10-CM

## 2021-04-28 DIAGNOSIS — R10.9 UNSPECIFIED ABDOMINAL PAIN: ICD-10-CM

## 2021-04-28 PROCEDURE — 99214 OFFICE O/P EST MOD 30 MIN: CPT

## 2021-04-28 PROCEDURE — 99072 ADDL SUPL MATRL&STAF TM PHE: CPT

## 2021-05-05 ENCOUNTER — APPOINTMENT (OUTPATIENT)
Dept: INTERNAL MEDICINE | Facility: CLINIC | Age: 61
End: 2021-05-05
Payer: COMMERCIAL

## 2021-05-05 VITALS
TEMPERATURE: 97.1 F | HEIGHT: 70 IN | OXYGEN SATURATION: 98 % | DIASTOLIC BLOOD PRESSURE: 78 MMHG | WEIGHT: 252 LBS | HEART RATE: 82 BPM | BODY MASS INDEX: 36.08 KG/M2 | SYSTOLIC BLOOD PRESSURE: 128 MMHG

## 2021-05-05 DIAGNOSIS — S86.902A: ICD-10-CM

## 2021-05-05 PROCEDURE — 99072 ADDL SUPL MATRL&STAF TM PHE: CPT

## 2021-05-05 PROCEDURE — 99214 OFFICE O/P EST MOD 30 MIN: CPT

## 2021-05-06 NOTE — PHYSICAL EXAM
[No Acute Distress] : no acute distress [Well Nourished] : well nourished [Well Developed] : well developed [Well-Appearing] : well-appearing [Normal Sclera/Conjunctiva] : normal sclera/conjunctiva [EOMI] : extraocular movements intact [Normal Outer Ear/Nose] : the outer ears and nose were normal in appearance [Normal Oropharynx] : the oropharynx was normal [Normal TMs] : both tympanic membranes were normal [Supple] : supple [No JVD] : no jugular venous distention [No Lymphadenopathy] : no lymphadenopathy [No Respiratory Distress] : no respiratory distress  [Clear to Auscultation] : lungs were clear to auscultation bilaterally [No Accessory Muscle Use] : no accessory muscle use [Normal Rate] : normal rate  [Regular Rhythm] : with a regular rhythm [No Murmur] : no murmur heard [Normal S1, S2] : normal S1 and S2 [No Carotid Bruits] : no carotid bruits [Pedal Pulses Present] : the pedal pulses are present [No Edema] : there was no peripheral edema [Soft] : abdomen soft [Non Tender] : non-tender [No Masses] : no abdominal mass palpated [Non-distended] : non-distended [No HSM] : no HSM [Normal Bowel Sounds] : normal bowel sounds [Normal Posterior Cervical Nodes] : no posterior cervical lymphadenopathy [Normal Anterior Cervical Nodes] : no anterior cervical lymphadenopathy [No Joint Swelling] : no joint swelling [Grossly Normal Strength/Tone] : grossly normal strength/tone [No Rash] : no rash [Normal Gait] : normal gait [No Focal Deficits] : no focal deficits [Normal Affect] : the affect was normal [Normal Insight/Judgement] : insight and judgment were intact [de-identified] : + left flank tenderness

## 2021-05-06 NOTE — HISTORY OF PRESENT ILLNESS
[FreeTextEntry1] : h/o left foot fructure 4 years ago > was in boot x 3 months > now has left LE muscle atrophy [de-identified] : 60  year old male with h/o DM/Hypertension/ Hyperlipidemia / CAD with stents    presents for follow up on back pain and lab results. Pt reports feeling better> back pain improved. Recent blood work with worsening of renal function, proteinuria, Hb A1C 10.1.US renal/ bladder without acute pathology \par   He is following with cardiologist Dr. Quispe for CAD \par He denies CP/SOB, dizziness, exertional symptoms, N, V, dysuria , voiding problems. \par He is not compliant with daily home glucose monitoring, not compliant with diet/ exercise. \par Also c/o LLE muscle atrophy after foot fracture 4 years ago > following with ortho \par \par

## 2021-05-06 NOTE — PLAN
[FreeTextEntry1] : 1. CKD / Proteinuria > worsening\par nephrology referral ASAP\par decrease Metformin to 500 mg BID\par will monitor renal function\par US renal/ bladder > without acute pathology\par 2. DM, uncontrolled\par decrease Metformin to 500 mg BID due to worsening of renal function\par start Lantus 10 u QHS \par cont Glipizide\par endo referral \par 3. CAD/Htn/ Hld\par cont current meds\par cardio follow up\par follow up 2 weeks

## 2021-05-06 NOTE — PLAN
MA informed pt via phone of MRI brain results and MS diagnosis per Dr. Giorgio Keating. Pt verbalized understanding and all questions were answered. Pt to  Ocrevus handout and start forms from [facility] office. Pt will review info and inform office of her decision to start Ocrevus and return start forms to proceed. Forwarding to Dr. Giorgio Keating and his RN for informational purposes.   Electronically signed by Ree Worthington on 5/29/20 at 3:42 PM EDT
[FreeTextEntry1] : 1. see plan\par 2. DM/CAD\par cont current meds

## 2021-05-06 NOTE — PHYSICAL EXAM
[No Acute Distress] : no acute distress [Well Nourished] : well nourished [Well Developed] : well developed [Well-Appearing] : well-appearing [Normal Sclera/Conjunctiva] : normal sclera/conjunctiva [EOMI] : extraocular movements intact [Normal Outer Ear/Nose] : the outer ears and nose were normal in appearance [Normal Oropharynx] : the oropharynx was normal [Normal TMs] : both tympanic membranes were normal [No JVD] : no jugular venous distention [No Lymphadenopathy] : no lymphadenopathy [Supple] : supple [No Respiratory Distress] : no respiratory distress  [No Accessory Muscle Use] : no accessory muscle use [Clear to Auscultation] : lungs were clear to auscultation bilaterally [Normal Rate] : normal rate  [Regular Rhythm] : with a regular rhythm [Normal S1, S2] : normal S1 and S2 [No Murmur] : no murmur heard [No Carotid Bruits] : no carotid bruits [Pedal Pulses Present] : the pedal pulses are present [No Edema] : there was no peripheral edema [Soft] : abdomen soft [Non Tender] : non-tender [Non-distended] : non-distended [Normal Bowel Sounds] : normal bowel sounds [Normal Posterior Cervical Nodes] : no posterior cervical lymphadenopathy [Normal Anterior Cervical Nodes] : no anterior cervical lymphadenopathy [No CVA Tenderness] : no CVA  tenderness [No Spinal Tenderness] : no spinal tenderness [No Joint Swelling] : no joint swelling [Grossly Normal Strength/Tone] : grossly normal strength/tone [No Rash] : no rash [Coordination Grossly Intact] : coordination grossly intact [No Focal Deficits] : no focal deficits [Normal Gait] : normal gait [Deep Tendon Reflexes (DTR)] : deep tendon reflexes were 2+ and symmetric [Normal Affect] : the affect was normal [Normal Insight/Judgement] : insight and judgment were intact [de-identified] : + LLE decrease muscle bulk

## 2021-05-06 NOTE — HISTORY OF PRESENT ILLNESS
[FreeTextEntry1] : \par back pain on Motrin > left side > sleeping sitting up \par exercising with weights / 4-5> at night (  [de-identified] : 60  year old male with h/o DM/Hypertension/ Hyperlipidemia / CAD with stents    presents c/o worsening of low back/ left flank  pain x 1 week. Pain left sided, denies radiation to lower extremities. Pain worse at night ant sitting up. He takes Motrin prn for pain \par   He is following with cardiologist Dr. Quispe for CAD \par He denies CP/SOB, dizziness, exertional symptoms, N, V, dysuria \par \par

## 2021-05-07 RX ORDER — CONTAINER,EMPTY
EACH MISCELLANEOUS
Qty: 1 | Refills: 0 | Status: ACTIVE | COMMUNITY
Start: 2021-05-07 | End: 1900-01-01

## 2021-05-10 LAB
25(OH)D3 SERPL-MCNC: 30.2 NG/ML
ALBUMIN SERPL ELPH-MCNC: 4.8 G/DL
ALP BLD-CCNC: 90 U/L
ALT SERPL-CCNC: 15 U/L
ANION GAP SERPL CALC-SCNC: 14 MMOL/L
APPEARANCE: ABNORMAL
AST SERPL-CCNC: 16 U/L
BACTERIA: NEGATIVE
BASOPHILS # BLD AUTO: 0.08 K/UL
BASOPHILS NFR BLD AUTO: 1 %
BILIRUB SERPL-MCNC: 0.6 MG/DL
BILIRUBIN URINE: NEGATIVE
BLOOD URINE: NORMAL
BUN SERPL-MCNC: 25 MG/DL
CALCIUM OXALATE CRYSTALS: ABNORMAL
CALCIUM SERPL-MCNC: 9.9 MG/DL
CHLORIDE SERPL-SCNC: 98 MMOL/L
CHOLEST SERPL-MCNC: 107 MG/DL
CO2 SERPL-SCNC: 26 MMOL/L
COLOR: YELLOW
CREAT SERPL-MCNC: 1.85 MG/DL
EOSINOPHIL # BLD AUTO: 0.11 K/UL
EOSINOPHIL NFR BLD AUTO: 1.3 %
ESTIMATED AVERAGE GLUCOSE: 243 MG/DL
GLUCOSE QUALITATIVE U: ABNORMAL
GLUCOSE SERPL-MCNC: 287 MG/DL
GRANULAR CASTS: 5 /LPF
HBA1C MFR BLD HPLC: 10.1 %
HCT VFR BLD CALC: 41.8 %
HDLC SERPL-MCNC: 27 MG/DL
HGB BLD-MCNC: 13.1 G/DL
HYALINE CASTS: 40 /LPF
IMM GRANULOCYTES NFR BLD AUTO: 0.2 %
KETONES URINE: NEGATIVE
LDLC SERPL CALC-MCNC: 35 MG/DL
LEUKOCYTE ESTERASE URINE: NEGATIVE
LYMPHOCYTES # BLD AUTO: 2.66 K/UL
LYMPHOCYTES NFR BLD AUTO: 32 %
MAN DIFF?: NORMAL
MCHC RBC-ENTMCNC: 27.6 PG
MCHC RBC-ENTMCNC: 31.3 GM/DL
MCV RBC AUTO: 88.2 FL
MICROSCOPIC-UA: NORMAL
MONOCYTES # BLD AUTO: 0.54 K/UL
MONOCYTES NFR BLD AUTO: 6.5 %
NEUTROPHILS # BLD AUTO: 4.89 K/UL
NEUTROPHILS NFR BLD AUTO: 59 %
NITRITE URINE: NEGATIVE
NONHDLC SERPL-MCNC: 80 MG/DL
PH URINE: 5.5
PLATELET # BLD AUTO: 296 K/UL
POTASSIUM SERPL-SCNC: 4.1 MMOL/L
PROT SERPL-MCNC: 7.8 G/DL
PROTEIN URINE: ABNORMAL
RBC # BLD: 4.74 M/UL
RBC # FLD: 14 %
RED BLOOD CELLS URINE: 1 /HPF
SODIUM SERPL-SCNC: 138 MMOL/L
SPECIFIC GRAVITY URINE: 1.02
SQUAMOUS EPITHELIAL CELLS: 3 /HPF
TRIGL SERPL-MCNC: 224 MG/DL
TSH SERPL-ACNC: 1.01 UIU/ML
UROBILINOGEN URINE: NORMAL
VIT B12 SERPL-MCNC: 492 PG/ML
WBC # FLD AUTO: 8.3 K/UL
WHITE BLOOD CELLS URINE: 1 /HPF

## 2021-05-12 ENCOUNTER — NON-APPOINTMENT (OUTPATIENT)
Age: 61
End: 2021-05-12

## 2021-05-12 ENCOUNTER — APPOINTMENT (OUTPATIENT)
Dept: CARDIOLOGY | Facility: CLINIC | Age: 61
End: 2021-05-12
Payer: COMMERCIAL

## 2021-05-12 ENCOUNTER — APPOINTMENT (OUTPATIENT)
Dept: INTERNAL MEDICINE | Facility: CLINIC | Age: 61
End: 2021-05-12
Payer: COMMERCIAL

## 2021-05-12 VITALS
TEMPERATURE: 96.9 F | HEART RATE: 75 BPM | WEIGHT: 247 LBS | BODY MASS INDEX: 35.36 KG/M2 | SYSTOLIC BLOOD PRESSURE: 126 MMHG | OXYGEN SATURATION: 98 % | DIASTOLIC BLOOD PRESSURE: 75 MMHG | HEIGHT: 70 IN

## 2021-05-12 DIAGNOSIS — R94.31 ABNORMAL ELECTROCARDIOGRAM [ECG] [EKG]: ICD-10-CM

## 2021-05-12 PROCEDURE — 99214 OFFICE O/P EST MOD 30 MIN: CPT

## 2021-05-12 PROCEDURE — 99072 ADDL SUPL MATRL&STAF TM PHE: CPT

## 2021-05-12 PROCEDURE — 93000 ELECTROCARDIOGRAM COMPLETE: CPT

## 2021-05-12 RX ORDER — SAXAGLIPTIN 2.5 MG/1
2.5 TABLET, FILM COATED ORAL
Qty: 30 | Refills: 1 | Status: DISCONTINUED | COMMUNITY
Start: 2019-12-26 | End: 2021-05-12

## 2021-05-12 RX ORDER — EZETIMIBE 10 MG/1
10 TABLET ORAL
Qty: 90 | Refills: 3 | Status: DISCONTINUED | COMMUNITY
Start: 2019-03-27 | End: 2021-05-12

## 2021-05-12 NOTE — HISTORY OF PRESENT ILLNESS
[de-identified] : 60  year old male with h/o DM/Hypertension/ Hyperlipidemia / CAD with stents    presents for follow up on uncontrolled diabetes. \par He was started on Lantus 10 u  QHS last week in addition to Glipizide 10 mg QD, he is off Metformin due to worsening of renal insufficiency. He reports  in am , he is not monitoring his glucose during the day. \par   He is following with cardiologist Dr. Quispe for CAD \par He denies CP/SOB, dizziness, exertional symptoms, N, V, dysuria , voiding problems. \par he is awaiting nephrology appointment \par \par

## 2021-05-12 NOTE — PLAN
[FreeTextEntry1] : 1. CKD / Proteinuria > worsening\par nephrology evaluation pending \par will monitor renal function\par US renal/ bladder > without acute pathology\par 2. DM, uncontrolled\par Metformin to 500 mg BID due to worsening of renal function\par increase  Lantus to 15 u QHS \par cont Glipizide 10 mg QD\par endo referral \par 3. CAD/Htn/ Hld\par cardio follow up noted\par cont Crestor/ off Zetia\par Brilinta 60 mg QD\par Lisinopril 20 mg QD\par Lasix 40 mg QD\par Coreg 3.125 mg BID

## 2021-05-12 NOTE — DISCUSSION/SUMMARY
[___ Month(s)] : in [unfilled] month(s) [FreeTextEntry1] : The patient is a 60-year-old gentleman a history of diabetes mellitus, hypertension, hyperlipidemia, CAD EF=40% who has been noncompliant with f/u until now. \par #1 CV- dyspnea but euvolemic on exam, change brilinta to 60mg bid, ECHO and nuclear stress ordered, lasix 40mg daily for now \par #2 Htn- continue coreg and lisinopril\par #3 Lipids- continue crestor and stop zetia then reevaluate\par #4 DM- on orals, suboptimal control, We discussed adherence to a low fat low cholesterol, ADA diet, weight loss and regular daily exercise.\par #5 APC- continue coreg\par #6 General- received COVID vaccines, regular daily walking and continued weight loss

## 2021-05-12 NOTE — PHYSICAL EXAM
[No Acute Distress] : no acute distress [Well Nourished] : well nourished [Well Developed] : well developed [Well-Appearing] : well-appearing [Normal Sclera/Conjunctiva] : normal sclera/conjunctiva [EOMI] : extraocular movements intact [Normal Outer Ear/Nose] : the outer ears and nose were normal in appearance [Normal Oropharynx] : the oropharynx was normal [Normal TMs] : both tympanic membranes were normal [No JVD] : no jugular venous distention [No Lymphadenopathy] : no lymphadenopathy [Supple] : supple [No Respiratory Distress] : no respiratory distress  [No Accessory Muscle Use] : no accessory muscle use [Clear to Auscultation] : lungs were clear to auscultation bilaterally [Normal Rate] : normal rate  [Regular Rhythm] : with a regular rhythm [Normal S1, S2] : normal S1 and S2 [No Murmur] : no murmur heard [No Carotid Bruits] : no carotid bruits [Pedal Pulses Present] : the pedal pulses are present [No Edema] : there was no peripheral edema [Soft] : abdomen soft [Non Tender] : non-tender [Non-distended] : non-distended [Normal Bowel Sounds] : normal bowel sounds [Normal Posterior Cervical Nodes] : no posterior cervical lymphadenopathy [Normal Anterior Cervical Nodes] : no anterior cervical lymphadenopathy [No CVA Tenderness] : no CVA  tenderness [No Spinal Tenderness] : no spinal tenderness [No Joint Swelling] : no joint swelling [Grossly Normal Strength/Tone] : grossly normal strength/tone [No Rash] : no rash [Coordination Grossly Intact] : coordination grossly intact [No Focal Deficits] : no focal deficits [Normal Gait] : normal gait [Deep Tendon Reflexes (DTR)] : deep tendon reflexes were 2+ and symmetric [Normal Affect] : the affect was normal [Normal Insight/Judgement] : insight and judgment were intact [de-identified] : + LLE decrease muscle bulk

## 2021-05-12 NOTE — HISTORY OF PRESENT ILLNESS
[FreeTextEntry1] : Mik has not been here in two years. He is trying to lose weight and take better care of himself. Walks a lot now. Diabetes under poor control. No chest pain or palpitations but short of breath on stairs.

## 2021-05-12 NOTE — REVIEW OF SYSTEMS
[Weight Loss (___ Lbs)] : [unfilled] ~Ulb weight loss [SOB] : shortness of breath [Dyspnea on exertion] : dyspnea during exertion [Chest Discomfort] : no chest discomfort [Lower Ext Edema] : no extremity edema [Leg Claudication] : no intermittent leg claudication [Palpitations] : no palpitations [Orthopnea] : no orthopnea [PND] : no PND [Syncope] : no syncope [Negative] : Heme/Lymph

## 2021-05-25 ENCOUNTER — APPOINTMENT (OUTPATIENT)
Dept: ENDOCRINOLOGY | Facility: CLINIC | Age: 61
End: 2021-05-25

## 2021-05-26 ENCOUNTER — APPOINTMENT (OUTPATIENT)
Dept: INTERNAL MEDICINE | Facility: CLINIC | Age: 61
End: 2021-05-26

## 2021-06-07 ENCOUNTER — APPOINTMENT (OUTPATIENT)
Dept: NEPHROLOGY | Facility: CLINIC | Age: 61
End: 2021-06-07
Payer: COMMERCIAL

## 2021-06-07 VITALS
BODY MASS INDEX: 35.73 KG/M2 | SYSTOLIC BLOOD PRESSURE: 121 MMHG | OXYGEN SATURATION: 98 % | WEIGHT: 249 LBS | TEMPERATURE: 97.3 F | HEART RATE: 81 BPM | DIASTOLIC BLOOD PRESSURE: 69 MMHG

## 2021-06-07 DIAGNOSIS — N25.0 RENAL OSTEODYSTROPHY: ICD-10-CM

## 2021-06-07 PROCEDURE — 99204 OFFICE O/P NEW MOD 45 MIN: CPT

## 2021-06-07 NOTE — PHYSICAL EXAM
[General Appearance - Alert] : alert [General Appearance - In No Acute Distress] : in no acute distress [General Appearance - Well Nourished] : well nourished [Sclera] : the sclera and conjunctiva were normal [Outer Ear] : the ears and nose were normal in appearance [Hearing Threshold Finger Rub Not [place]] : hearing was normal [Nasal Cavity] : the nasal mucosa and septum were normal [Neck Appearance] : the appearance of the neck was normal [Respiration, Rhythm And Depth] : normal respiratory rhythm and effort [Auscultation Breath Sounds / Voice Sounds] : lungs were clear to auscultation bilaterally [Heart Rate And Rhythm] : heart rate was normal and rhythm regular [Heart Sounds] : normal S1 and S2 [Heart Sounds Gallop] : no gallops [Murmurs] : no murmurs [Edema] : there was no peripheral edema [Veins - Varicosity Changes] : there were no varicosital changes [Bowel Sounds] : normal bowel sounds [Abdomen Soft] : soft [Abdomen Tenderness] : non-tender [Abdomen Mass (___ Cm)] : no abdominal mass palpated [No CVA Tenderness] : no ~M costovertebral angle tenderness [No Spinal Tenderness] : no spinal tenderness [Abnormal Walk] : normal gait [Musculoskeletal - Swelling] : no joint swelling seen [Skin Color & Pigmentation] : normal skin color and pigmentation [] : no rash [Skin Lesions] : no skin lesions [Motor Exam] : the motor exam was normal [No Focal Deficits] : no focal deficits [Impaired Insight] : insight and judgment were intact [Affect] : the affect was normal [Mood] : the mood was normal

## 2021-06-07 NOTE — HISTORY OF PRESENT ILLNESS
[FreeTextEntry1] :  Mr. CYNTHIA MOSQUERA is a 60 year-old man with a PMH of long-standing DM, HTN, CAD s/p stents who presents [facility] for the evaluation of stage 3 CKD. \par \par Kidney history:\par Mr. MOSQUERA has a baseline serum creatinine of 1.4-1.5mg/dL since 2019. However, his serum creatinine in 4/2021 went up to 1.85mg/dL. His sugar was 287mg/dL. \par Urinalyses showed 100 protein and glucose>1000. \par Kidney imaging done in 2021 showed no evidence of hydronephrosis. \par \par He denies having nausea/vomiting/diarrhea. He has a good appetite. He denies hematuria, frothy urine or dysuria. He denies shortness of breath, chest pain, headache, edema. No hand tremors. He  denies skin rash, joint pains or hair loss. He denies herbal supplements. He was taking meloxicam for several days for back pain a few months prior, but otherwise, not taking NSAIDS consistently. \par No family history of kidney disease.\par \par DM:\par Diagnosed in ~2005. A1C~8%. He was on metformin for many years. But Dr. Mcmullen discontinued metformin once his serum creatinine started going up to 1.8mg/dL. Currently on lantus and glipizide.\par \par HTN:\par Diagnosed in ~2005\par Currently on carvedilol, lisinopril and furosemide. He does not have a BP machine at home. But his clinic BP had been in the 120s/70s. \par He follows a low salt diet (he eats mostly home-cooked meals and vegetables). \par \par Nephrolithiasis:\par One episode of kidney stone in 1991. No further episodes since then. \par \par \par

## 2021-06-07 NOTE — ASSESSMENT
[FreeTextEntry1] : \par 1. Chronic kidney disease stage 3b (proteinuric) . \par Patient's baseline serum creatinine is around 1.4-1.8, with a corresponding eGFR of around 38-45 ml/min/1.73m2.\par The etiology of CKD is likely secondary to long-standing diabetes. In order to slow progression, patient is advised have good blood pressure and blood glucose control and to avoid NSAIDs. \par >50% of the encounter was spent discussing about kidney function, stages of CKD, and steps to slow progression of kidney disease. \par \par We discussed about "sick day plan". Given that he  is on lisinopril and furosemide, he is at risk of acute kidney injury when experiencing an acute illness (diarrhea/ vomiting/high fever). If he  ever has an acute illness , he needs to inform me. We may need to hold lisinopril and furosemide for several days until the acute illness resolves\par \par 2. HTN - Goal BP for patient is < 130/80. \par Patient's current BP is at goal. \par Continue lisinopril, carvedilol and furosemide.\par Low salt diet recommended.\par \par 3. Renal bone disease - Since patient has CKD, we will screen for secondary hyperparathyroidism and hyperphosphatemia. I will check iPTH and phos level today.\par \par 4. Medication monitoring encounter. Medication dose reviewed. To dose medication to CrCl ~35-40. \par \par Patient is recommended to follow up in 4-5 months. \par \par

## 2021-06-09 LAB
ALBUMIN MFR SERPL ELPH: 56.2 %
ALBUMIN SERPL ELPH-MCNC: 4.2 G/DL
ALBUMIN SERPL-MCNC: 4.1 G/DL
ALBUMIN/GLOB SERPL: 1.3 RATIO
ALPHA1 GLOB MFR SERPL ELPH: 4.5 %
ALPHA1 GLOB SERPL ELPH-MCNC: 0.3 G/DL
ALPHA2 GLOB MFR SERPL ELPH: 11.9 %
ALPHA2 GLOB SERPL ELPH-MCNC: 0.9 G/DL
ANION GAP SERPL CALC-SCNC: 12 MMOL/L
APPEARANCE: CLEAR
B-GLOBULIN MFR SERPL ELPH: 11.2 %
B-GLOBULIN SERPL ELPH-MCNC: 0.8 G/DL
BACTERIA: NEGATIVE
BILIRUBIN URINE: NEGATIVE
BLOOD URINE: NORMAL
BUN SERPL-MCNC: 28 MG/DL
CALCIUM SERPL-MCNC: 9.6 MG/DL
CALCIUM SERPL-MCNC: 9.6 MG/DL
CHLORIDE SERPL-SCNC: 100 MMOL/L
CO2 SERPL-SCNC: 24 MMOL/L
COLOR: YELLOW
CREAT SERPL-MCNC: 1.75 MG/DL
CREAT SPEC-SCNC: 145 MG/DL
CREAT/PROT UR: 0.7 RATIO
GAMMA GLOB FLD ELPH-MCNC: 1.2 G/DL
GAMMA GLOB MFR SERPL ELPH: 16.2 %
GLUCOSE QUALITATIVE U: ABNORMAL
GLUCOSE SERPL-MCNC: 284 MG/DL
HYALINE CASTS: 2 /LPF
INTERPRETATION SERPL IEP-IMP: NORMAL
KETONES URINE: NEGATIVE
LEUKOCYTE ESTERASE URINE: NEGATIVE
M PROTEIN SPEC IFE-MCNC: NORMAL
MICROSCOPIC-UA: NORMAL
NITRITE URINE: NEGATIVE
PARATHYROID HORMONE INTACT: 28 PG/ML
PH URINE: 6
PHOSPHATE SERPL-MCNC: 2.8 MG/DL
POTASSIUM SERPL-SCNC: 4.5 MMOL/L
PROT SERPL-MCNC: 7.3 G/DL
PROT SERPL-MCNC: 7.3 G/DL
PROT UR-MCNC: 100 MG/DL
PROTEIN URINE: ABNORMAL
RED BLOOD CELLS URINE: 1 /HPF
SODIUM SERPL-SCNC: 135 MMOL/L
SPECIFIC GRAVITY URINE: 1.03
SQUAMOUS EPITHELIAL CELLS: 2 /HPF
UROBILINOGEN URINE: NORMAL
WHITE BLOOD CELLS URINE: 2 /HPF

## 2021-11-06 ENCOUNTER — RX RENEWAL (OUTPATIENT)
Age: 61
End: 2021-11-06

## 2021-11-08 ENCOUNTER — APPOINTMENT (OUTPATIENT)
Dept: ENDOCRINOLOGY | Facility: CLINIC | Age: 61
End: 2021-11-08

## 2021-12-21 ENCOUNTER — APPOINTMENT (OUTPATIENT)
Dept: INTERNAL MEDICINE | Facility: CLINIC | Age: 61
End: 2021-12-21
Payer: COMMERCIAL

## 2021-12-21 VITALS
TEMPERATURE: 96.3 F | BODY MASS INDEX: 36.22 KG/M2 | OXYGEN SATURATION: 98 % | DIASTOLIC BLOOD PRESSURE: 73 MMHG | HEART RATE: 89 BPM | HEIGHT: 70 IN | WEIGHT: 253 LBS | SYSTOLIC BLOOD PRESSURE: 117 MMHG | RESPIRATION RATE: 12 BRPM

## 2021-12-21 PROCEDURE — 99396 PREV VISIT EST AGE 40-64: CPT

## 2021-12-23 LAB
25(OH)D3 SERPL-MCNC: 27.5 NG/ML
ALBUMIN SERPL ELPH-MCNC: 4.4 G/DL
ALP BLD-CCNC: 103 U/L
ALT SERPL-CCNC: 18 U/L
ANION GAP SERPL CALC-SCNC: 13 MMOL/L
APPEARANCE: CLEAR
AST SERPL-CCNC: 19 U/L
BASOPHILS # BLD AUTO: 0.08 K/UL
BASOPHILS NFR BLD AUTO: 0.8 %
BILIRUB SERPL-MCNC: 0.8 MG/DL
BILIRUBIN URINE: NEGATIVE
BLOOD URINE: NEGATIVE
BUN SERPL-MCNC: 22 MG/DL
CALCIUM SERPL-MCNC: 9.7 MG/DL
CHLORIDE SERPL-SCNC: 92 MMOL/L
CHOLEST SERPL-MCNC: 98 MG/DL
CO2 SERPL-SCNC: 25 MMOL/L
COLOR: YELLOW
COVID-19 SPIKE DOMAIN ANTIBODY INTERPRETATION: POSITIVE
CREAT SERPL-MCNC: 1.46 MG/DL
EOSINOPHIL # BLD AUTO: 0.15 K/UL
EOSINOPHIL NFR BLD AUTO: 1.5 %
ESTIMATED AVERAGE GLUCOSE: 318 MG/DL
GLUCOSE QUALITATIVE U: ABNORMAL
GLUCOSE SERPL-MCNC: 452 MG/DL
HBA1C MFR BLD HPLC: 12.7 %
HCT VFR BLD CALC: 40.6 %
HDLC SERPL-MCNC: 25 MG/DL
HGB BLD-MCNC: 13.6 G/DL
IMM GRANULOCYTES NFR BLD AUTO: 0.3 %
KETONES URINE: NEGATIVE
LDLC SERPL CALC-MCNC: 45 MG/DL
LEUKOCYTE ESTERASE URINE: NEGATIVE
LYMPHOCYTES # BLD AUTO: 2.67 K/UL
LYMPHOCYTES NFR BLD AUTO: 27.2 %
MAN DIFF?: NORMAL
MCHC RBC-ENTMCNC: 27.9 PG
MCHC RBC-ENTMCNC: 33.5 GM/DL
MCV RBC AUTO: 83.4 FL
MONOCYTES # BLD AUTO: 0.59 K/UL
MONOCYTES NFR BLD AUTO: 6 %
NEUTROPHILS # BLD AUTO: 6.3 K/UL
NEUTROPHILS NFR BLD AUTO: 64.2 %
NITRITE URINE: NEGATIVE
NONHDLC SERPL-MCNC: 73 MG/DL
PH URINE: 5
PLATELET # BLD AUTO: 313 K/UL
POTASSIUM SERPL-SCNC: 4.6 MMOL/L
PROT SERPL-MCNC: 8.1 G/DL
PROTEIN URINE: NORMAL
PSA SERPL-MCNC: 0.47 NG/ML
RBC # BLD: 4.87 M/UL
RBC # FLD: 12.8 %
SARS-COV-2 AB SERPL IA-ACNC: 190 U/ML
SODIUM SERPL-SCNC: 130 MMOL/L
SPECIFIC GRAVITY URINE: 1.03
TRIGL SERPL-MCNC: 137 MG/DL
TSH SERPL-ACNC: 0.94 UIU/ML
UROBILINOGEN URINE: NORMAL
VIT B12 SERPL-MCNC: 768 PG/ML
WBC # FLD AUTO: 9.82 K/UL

## 2021-12-25 NOTE — HEALTH RISK ASSESSMENT
[Never] : Never [Yes] : Yes [With Family] : lives with family [Employed] : employed [] :  [de-identified] : Rare  [ColonoscopyDate] : 2019

## 2021-12-25 NOTE — PHYSICAL EXAM
[No Acute Distress] : no acute distress [Well Nourished] : well nourished [Well Developed] : well developed [Normal Sclera/Conjunctiva] : normal sclera/conjunctiva [EOMI] : extraocular movements intact [Normal Outer Ear/Nose] : the outer ears and nose were normal in appearance [Normal Oropharynx] : the oropharynx was normal [No JVD] : no jugular venous distention [No Lymphadenopathy] : no lymphadenopathy [Supple] : supple [Thyroid Normal, No Nodules] : the thyroid was normal and there were no nodules present [No Respiratory Distress] : no respiratory distress  [No Accessory Muscle Use] : no accessory muscle use [Clear to Auscultation] : lungs were clear to auscultation bilaterally [Normal Rate] : normal rate  [Regular Rhythm] : with a regular rhythm [Normal S1, S2] : normal S1 and S2 [No Murmur] : no murmur heard [Pedal Pulses Present] : the pedal pulses are present [No Edema] : there was no peripheral edema [No Extremity Clubbing/Cyanosis] : no extremity clubbing/cyanosis [Soft] : abdomen soft [Non Tender] : non-tender [Non-distended] : non-distended [No Masses] : no abdominal mass palpated [No HSM] : no HSM [Normal Bowel Sounds] : normal bowel sounds [Normal Posterior Cervical Nodes] : no posterior cervical lymphadenopathy [Normal Anterior Cervical Nodes] : no anterior cervical lymphadenopathy [No CVA Tenderness] : no CVA  tenderness [No Spinal Tenderness] : no spinal tenderness [No Joint Swelling] : no joint swelling [Grossly Normal Strength/Tone] : grossly normal strength/tone [No Rash] : no rash [Coordination Grossly Intact] : coordination grossly intact [No Focal Deficits] : no focal deficits [Normal Gait] : normal gait [Normal Affect] : the affect was normal [Normal Insight/Judgement] : insight and judgment were intact [Normal TMs] : both tympanic membranes were normal

## 2021-12-25 NOTE — PLAN
[FreeTextEntry1] : Physical annual\par see plan \par \par Declines Flu shot\par \par HTN/HLD/CAD\par Low sodium, low cholesterol diet/ increased physical activity\par Cont Coreg 3.125 mg twice daily\par cont Lisinopril 20 mg daily\par cont Lasix 40 mg daily\par cont Brilinta 60 mg twice daily\par \par Diabetes\par Low carb, low sugar diet/ increased physical activity\par cont to  monitor blood sugars at home \par cont Lantus 10 unit qhs \par Glipizide 10 mg QD\par \par CKD\par will monitor renal function \par \par Labs ordered pt to follow-up in 1 week for results

## 2021-12-25 NOTE — HISTORY OF PRESENT ILLNESS
[de-identified] : 61 year old male with h/o DM/Hypertension/ Hyperlipidemia / CAD with stents presents for annual physical\par \par He feels well, offers no complaints\par He currently checking FS 3 times week 140- 260\par He is not compliant with diet/ exercise and daily meds intake . He run his meds for 3 weeks \par \par Denies CP, SOB, HA, N/V or abdominal pain

## 2022-01-04 ENCOUNTER — TRANSCRIPTION ENCOUNTER (OUTPATIENT)
Age: 62
End: 2022-01-04

## 2022-01-04 ENCOUNTER — EMERGENCY (EMERGENCY)
Facility: HOSPITAL | Age: 62
LOS: 1 days | Discharge: HOME | End: 2022-01-04
Attending: EMERGENCY MEDICINE | Admitting: EMERGENCY MEDICINE
Payer: MEDICAID

## 2022-01-04 VITALS
DIASTOLIC BLOOD PRESSURE: 66 MMHG | OXYGEN SATURATION: 99 % | SYSTOLIC BLOOD PRESSURE: 136 MMHG | HEIGHT: 69 IN | HEART RATE: 82 BPM | TEMPERATURE: 100 F | RESPIRATION RATE: 18 BRPM

## 2022-01-04 VITALS
HEART RATE: 88 BPM | DIASTOLIC BLOOD PRESSURE: 76 MMHG | OXYGEN SATURATION: 100 % | SYSTOLIC BLOOD PRESSURE: 111 MMHG | RESPIRATION RATE: 18 BRPM

## 2022-01-04 DIAGNOSIS — M43.22 FUSION OF SPINE, CERVICAL REGION: Chronic | ICD-10-CM

## 2022-01-04 DIAGNOSIS — Z98.890 OTHER SPECIFIED POSTPROCEDURAL STATES: Chronic | ICD-10-CM

## 2022-01-04 DIAGNOSIS — S62.101A FRACTURE OF UNSPECIFIED CARPAL BONE, RIGHT WRIST, INITIAL ENCOUNTER FOR CLOSED FRACTURE: Chronic | ICD-10-CM

## 2022-01-04 LAB
ALBUMIN SERPL ELPH-MCNC: 4.4 G/DL — SIGNIFICANT CHANGE UP (ref 3.3–5)
ALP SERPL-CCNC: 89 U/L — SIGNIFICANT CHANGE UP (ref 40–120)
ALT FLD-CCNC: 19 U/L — SIGNIFICANT CHANGE UP (ref 4–41)
ANION GAP SERPL CALC-SCNC: 13 MMOL/L — SIGNIFICANT CHANGE UP (ref 7–14)
AST SERPL-CCNC: 23 U/L — SIGNIFICANT CHANGE UP (ref 4–40)
BASOPHILS # BLD AUTO: 0.05 K/UL — SIGNIFICANT CHANGE UP (ref 0–0.2)
BASOPHILS NFR BLD AUTO: 0.7 % — SIGNIFICANT CHANGE UP (ref 0–2)
BILIRUB SERPL-MCNC: 0.5 MG/DL — SIGNIFICANT CHANGE UP (ref 0.2–1.2)
BUN SERPL-MCNC: 27 MG/DL — HIGH (ref 7–23)
CALCIUM SERPL-MCNC: 9 MG/DL — SIGNIFICANT CHANGE UP (ref 8.4–10.5)
CHLORIDE SERPL-SCNC: 100 MMOL/L — SIGNIFICANT CHANGE UP (ref 98–107)
CO2 SERPL-SCNC: 25 MMOL/L — SIGNIFICANT CHANGE UP (ref 22–31)
CREAT SERPL-MCNC: 1.39 MG/DL — HIGH (ref 0.5–1.3)
EOSINOPHIL # BLD AUTO: 0.12 K/UL — SIGNIFICANT CHANGE UP (ref 0–0.5)
EOSINOPHIL NFR BLD AUTO: 1.8 % — SIGNIFICANT CHANGE UP (ref 0–6)
FLUAV AG NPH QL: SIGNIFICANT CHANGE UP
FLUBV AG NPH QL: SIGNIFICANT CHANGE UP
GLUCOSE SERPL-MCNC: 238 MG/DL — HIGH (ref 70–99)
HCT VFR BLD CALC: 39.1 % — SIGNIFICANT CHANGE UP (ref 39–50)
HGB BLD-MCNC: 12.8 G/DL — LOW (ref 13–17)
IANC: 3.99 K/UL — SIGNIFICANT CHANGE UP (ref 1.5–8.5)
IMM GRANULOCYTES NFR BLD AUTO: 0.4 % — SIGNIFICANT CHANGE UP (ref 0–1.5)
LYMPHOCYTES # BLD AUTO: 1.82 K/UL — SIGNIFICANT CHANGE UP (ref 1–3.3)
LYMPHOCYTES # BLD AUTO: 26.8 % — SIGNIFICANT CHANGE UP (ref 13–44)
MCHC RBC-ENTMCNC: 27.8 PG — SIGNIFICANT CHANGE UP (ref 27–34)
MCHC RBC-ENTMCNC: 32.7 GM/DL — SIGNIFICANT CHANGE UP (ref 32–36)
MCV RBC AUTO: 84.8 FL — SIGNIFICANT CHANGE UP (ref 80–100)
MONOCYTES # BLD AUTO: 0.77 K/UL — SIGNIFICANT CHANGE UP (ref 0–0.9)
MONOCYTES NFR BLD AUTO: 11.4 % — SIGNIFICANT CHANGE UP (ref 2–14)
NEUTROPHILS # BLD AUTO: 3.99 K/UL — SIGNIFICANT CHANGE UP (ref 1.8–7.4)
NEUTROPHILS NFR BLD AUTO: 58.9 % — SIGNIFICANT CHANGE UP (ref 43–77)
NRBC # BLD: 0 /100 WBCS — SIGNIFICANT CHANGE UP
NRBC # FLD: 0 K/UL — SIGNIFICANT CHANGE UP
PLATELET # BLD AUTO: 245 K/UL — SIGNIFICANT CHANGE UP (ref 150–400)
POTASSIUM SERPL-MCNC: 4.5 MMOL/L — SIGNIFICANT CHANGE UP (ref 3.5–5.3)
POTASSIUM SERPL-SCNC: 4.5 MMOL/L — SIGNIFICANT CHANGE UP (ref 3.5–5.3)
PROT SERPL-MCNC: 7.4 G/DL — SIGNIFICANT CHANGE UP (ref 6–8.3)
RBC # BLD: 4.61 M/UL — SIGNIFICANT CHANGE UP (ref 4.2–5.8)
RBC # FLD: 13.1 % — SIGNIFICANT CHANGE UP (ref 10.3–14.5)
RSV RNA NPH QL NAA+NON-PROBE: SIGNIFICANT CHANGE UP
SARS-COV-2 RNA SPEC QL NAA+PROBE: DETECTED
SODIUM SERPL-SCNC: 138 MMOL/L — SIGNIFICANT CHANGE UP (ref 135–145)
WBC # BLD: 6.78 K/UL — SIGNIFICANT CHANGE UP (ref 3.8–10.5)
WBC # FLD AUTO: 6.78 K/UL — SIGNIFICANT CHANGE UP (ref 3.8–10.5)

## 2022-01-04 PROCEDURE — 71045 X-RAY EXAM CHEST 1 VIEW: CPT | Mod: 26

## 2022-01-04 PROCEDURE — 99284 EMERGENCY DEPT VISIT MOD MDM: CPT | Mod: 25

## 2022-01-04 PROCEDURE — 93010 ELECTROCARDIOGRAM REPORT: CPT

## 2022-01-04 RX ORDER — ACETAMINOPHEN 500 MG
650 TABLET ORAL ONCE
Refills: 0 | Status: COMPLETED | OUTPATIENT
Start: 2022-01-04 | End: 2022-01-04

## 2022-01-04 RX ORDER — SODIUM CHLORIDE 9 MG/ML
1000 INJECTION INTRAMUSCULAR; INTRAVENOUS; SUBCUTANEOUS ONCE
Refills: 0 | Status: COMPLETED | OUTPATIENT
Start: 2022-01-04 | End: 2022-01-04

## 2022-01-04 RX ADMIN — Medication 650 MILLIGRAM(S): at 17:31

## 2022-01-04 RX ADMIN — SODIUM CHLORIDE 1000 MILLILITER(S): 9 INJECTION INTRAMUSCULAR; INTRAVENOUS; SUBCUTANEOUS at 17:41

## 2022-01-04 RX ADMIN — Medication 100 MILLIGRAM(S): at 17:30

## 2022-01-04 RX ADMIN — SODIUM CHLORIDE 1000 MILLILITER(S): 9 INJECTION INTRAMUSCULAR; INTRAVENOUS; SUBCUTANEOUS at 18:16

## 2022-01-04 RX ADMIN — Medication 650 MILLIGRAM(S): at 18:16

## 2022-01-04 NOTE — ED ADULT NURSE NOTE - NSIMPLEMENTINTERV_GEN_ALL_ED
Implemented All Universal Safety Interventions:  [place] to call system. Call bell, personal items and telephone within reach. Instruct patient to call for assistance. Room bathroom lighting operational. Non-slip footwear when patient is off stretcher. Physically safe environment: no spills, clutter or unnecessary equipment. Stretcher in lowest position, wheels locked, appropriate side rails in place.

## 2022-01-04 NOTE — ED ADULT TRIAGE NOTE - MODE OF ARRIVAL
Alert and oriented to person, place and time/Patient baseline mental status/Awake/Symptoms improved
Walk in

## 2022-01-04 NOTE — ED PROVIDER NOTE - RESPIRATORY, MLM
Breath sounds clear and equal bilaterally. speaking in full sentences, no increased WOB and saturating well on RA

## 2022-01-04 NOTE — ED PROVIDER NOTE - NSICDXPASTMEDICALHX_GEN_ALL_CORE_FT
PAST MEDICAL HISTORY:  CAD (coronary artery disease)     DM (diabetes mellitus)     Essential hypertension     Heart failure 4/2016 , request last echo to be faxed from Dr Quispe with cardiac clearance    Obesity (BMI 30-39.9) BMI 39.9    Other hyperlipidemia     Stented coronary artery times 6 ; last 4/2016 total of 6 stents , on brilinta and aspirin which has not been discontinued , cardiac evaluation scheduled today

## 2022-01-04 NOTE — ED PROVIDER NOTE - PATIENT PORTAL LINK FT
You can access the FollowMyHealth Patient Portal offered by [facility] by registering at the following website: [email or web address]. By joining [facility]’s FollowMyHealth portal, you will also be able to view your health information using other applications (apps) compatible with our system.

## 2022-01-04 NOTE — ED PROVIDER NOTE - NSICDXPASTSURGICALHX_GEN_ALL_CORE_FT
PAST SURGICAL HISTORY:  Cervical vertebral fusion with hardware    H/O shoulder surgery right - 1990's    Right wrist fracture with hardware

## 2022-01-04 NOTE — ED PROVIDER NOTE - NSICDXFAMILYHX_GEN_ALL_CORE_FT
FAMILY HISTORY:  Father  Still living? Unknown  Family history of coronary artery disease, Age at diagnosis: Age Unknown  Family history of diabetes mellitus (DM), Age at diagnosis: Age Unknown    Mother  Still living? Unknown  Family history of coronary artery disease, Age at diagnosis: Age Unknown  Family history of diabetes mellitus (DM), Age at diagnosis: Age Unknown

## 2022-01-04 NOTE — ED PROVIDER NOTE - NSFOLLOWUPINSTRUCTIONS_ED_ALL_ED_FT
you were seen in the ED today for flu-like symptoms.   you were found to be covid positive outpatient.   you were referred for monclonal antibodies and you will receive a phone call if you qualify for treatment.   continue taking prescribed home medications.   you can take tylenol 500-1000mg and or motrin 600mg every 6 hours for fever/discomfort.     -Stay inside your home as much as possible, avoiding public places or public interaction  -Do not go to work  -If you do enter any public domain, at minimum wear a surgical mask at all times  -Even while indoors, attempt to remain isolated from other individuals such as family or friends, as much as possible  -Return to the Emergency Department for any symptoms such as worsening shortness of breath, significant worsening cough, high fevers despite over the counter fever-reducing medications, or severe weakness/malaise

## 2022-01-04 NOTE — ED PROVIDER NOTE - CLINICAL SUMMARY MEDICAL DECISION MAKING FREE TEXT BOX
62yo M presenting with 3 days of cough, posttussive emesis and fatigue. tested positive for covid this AM. pulse ox at home with reading of 93% prompting ED visit. well appearing, afebrile without complaints of chest pain or shortness of breathing comfortably, speaking in full sentences and saturating 99% on RA. 62yo M presenting with 3 days of cough, posttussive emesis and fatigue. tested positive for covid this AM. pulse ox at home with reading of 93% prompting ED visit. well appearing, afebrile without complaints of chest pain or shortness of breathing comfortably, speaking in full sentences and saturating 99% on RA. will check labs, IVF, tessalon perle and CXR. likely dc.

## 2022-01-04 NOTE — ED ADULT NURSE NOTE - OBJECTIVE STATEMENT
pt rcd to red rm 03. pt c/o of COVID positive test this morning. pt c/o of chest pain upon coughing (6/10) with feelings of SOB upon exertion. pt states last episode of vomiting was last night. pt states dropping an object to his ankle resulting in open cut, band-aid is in place. skin is dry and in tact and normal for ethnicity. pt denies nausea, vomiting, dizziness, headache, diarrhea at this time. pt respirations even and unlabored with no accessory muscle use. pt O2 sat 100% on RA. pt normal sinus rhythm on the cardiac monitor. 20G IV placed to the LAC. pt medicated as per MD orders. VSS as noted. will continue to monitor.

## 2022-01-04 NOTE — ED ADULT NURSE REASSESSMENT NOTE - NS ED NURSE REASSESS COMMENT FT1
pt is A&Ox4. pt denies chest pain and SOB. pt denies nausea, vomiting, headache, and dizziness at this time. 20G to the LAC unremarkable. pt respirations even and unlabored with no accessory muscle use. pt O2 sat 99% on RA. pt in NAD and lying comfortably in stretcher in this time. awaiting DC instructions.
pt. to be DC. IV dc. no acute distress noted. respirations even and unlabored. VS as noted.

## 2022-01-04 NOTE — ED PROVIDER NOTE - ATTENDING CONTRIBUTION TO CARE
Pt was seen and evaluated by me. Pt is a 60 y/o male with PMHx of CAD, DM type 2, HTN, HLD, and Heart Failure who presented to the ED for chest pain X 3 days. Pt states he has a cough over the past 3 days with episodes of chest pain while coughing. Pt states he had some nausea and vomiting that has since resolved. notes vomiting usually posttussive. Pt denies any fever, chills, or abd pain. Denies any diarrhea. Pt was found to be COVID + this morning. Pt has a pulse ox at home and was concerned that it read 93%. Lungs CTA b/l. RRR. Abd soft, non-tender. No calf tenderness.  Concern for COVID/PNA  Labs, EKG, CXR, Cough suppressant

## 2022-01-04 NOTE — ED ADULT TRIAGE NOTE - CHIEF COMPLAINT QUOTE
Patient states " I have chest pain, cough, sob with fever since 4 days and I am tested positive this morning". h/o CAD with stents, CHF, DM.

## 2022-01-21 ENCOUNTER — APPOINTMENT (OUTPATIENT)
Dept: INTERNAL MEDICINE | Facility: CLINIC | Age: 62
End: 2022-01-21
Payer: COMMERCIAL

## 2022-01-21 VITALS
SYSTOLIC BLOOD PRESSURE: 134 MMHG | OXYGEN SATURATION: 100 % | WEIGHT: 258 LBS | RESPIRATION RATE: 12 BRPM | BODY MASS INDEX: 36.94 KG/M2 | DIASTOLIC BLOOD PRESSURE: 84 MMHG | TEMPERATURE: 96.3 F | HEIGHT: 70 IN | HEART RATE: 91 BPM

## 2022-01-21 PROCEDURE — 99214 OFFICE O/P EST MOD 30 MIN: CPT

## 2022-01-21 NOTE — PHYSICAL EXAM
[No Acute Distress] : no acute distress [Well Nourished] : well nourished [Well Developed] : well developed [Normal Sclera/Conjunctiva] : normal sclera/conjunctiva [EOMI] : extraocular movements intact [Normal Outer Ear/Nose] : the outer ears and nose were normal in appearance [Normal Oropharynx] : the oropharynx was normal [No JVD] : no jugular venous distention [No Lymphadenopathy] : no lymphadenopathy [Supple] : supple [Thyroid Normal, No Nodules] : the thyroid was normal and there were no nodules present [No Respiratory Distress] : no respiratory distress  [No Accessory Muscle Use] : no accessory muscle use [Clear to Auscultation] : lungs were clear to auscultation bilaterally [Normal Rate] : normal rate  [Regular Rhythm] : with a regular rhythm [Normal S1, S2] : normal S1 and S2 [No Murmur] : no murmur heard [Pedal Pulses Present] : the pedal pulses are present [No Edema] : there was no peripheral edema [No Extremity Clubbing/Cyanosis] : no extremity clubbing/cyanosis [Soft] : abdomen soft [Non Tender] : non-tender [Non-distended] : non-distended [No Masses] : no abdominal mass palpated [No HSM] : no HSM [Normal Bowel Sounds] : normal bowel sounds [Normal Posterior Cervical Nodes] : no posterior cervical lymphadenopathy [Normal Anterior Cervical Nodes] : no anterior cervical lymphadenopathy [No CVA Tenderness] : no CVA  tenderness [No Spinal Tenderness] : no spinal tenderness [No Joint Swelling] : no joint swelling [Grossly Normal Strength/Tone] : grossly normal strength/tone [No Rash] : no rash [Coordination Grossly Intact] : coordination grossly intact [No Focal Deficits] : no focal deficits [Normal Gait] : normal gait [Normal Affect] : the affect was normal [Normal Insight/Judgement] : insight and judgment were intact

## 2022-01-23 NOTE — PLAN
[FreeTextEntry1] : Follow-up \par \par Post COVID cough \par Tessalon Perles 200 mg 2-3 times/day PRN \par \par HTN/HLD/CAD\par Low sodium, low cholesterol diet/ increased physical activity\par Cont Coreg 3.125 mg twice daily\par cont Lisinopril 20 mg daily\par cont Lasix 40 mg daily\par cont Brilinta 60 mg twice daily\par \par Diabetes, poor contol \par Reinforced Low carb, low sugar diet/ increased physical activity\par cont to  monitor blood sugars at home \par increase Lantus 23 unit qhs \par Restart Metformin 500 mg twice daily ( last creatinine 1.46 ) \par Endocrine follow up

## 2022-02-25 ENCOUNTER — APPOINTMENT (OUTPATIENT)
Dept: INTERNAL MEDICINE | Facility: CLINIC | Age: 62
End: 2022-02-25

## 2022-04-10 NOTE — ED ADULT NURSE NOTE - NS ED PATIENT SAFETY CONCERN
[facility]  General Surgery Operative Assistant Note    Date: 4/10/2022  Patient: Jill Coy 89 year old female  YOB: 1925  MRN: [id number]  Location: [facility] MAIN OR    Please note that I functioned as an assistant to Dr. Lerma for exploratory laparotomy, adhesiolysis, repair of serosal injury.    There was no qualified assistant available due to the complexity of the procedure.  I performed the following surgical tasks: opening, closing, retraction, instrument exchange.      Michael Nguyen IV, MD, MS, FACS  [facility]  Trauma and Acute Care Surgery  Surgical Critical Care       No

## 2022-08-01 ENCOUNTER — APPOINTMENT (OUTPATIENT)
Dept: UROLOGY | Facility: CLINIC | Age: 62
End: 2022-08-01

## 2022-08-01 VITALS
WEIGHT: 240 LBS | DIASTOLIC BLOOD PRESSURE: 76 MMHG | RESPIRATION RATE: 12 BRPM | TEMPERATURE: 96.9 F | OXYGEN SATURATION: 100 % | HEART RATE: 83 BPM | SYSTOLIC BLOOD PRESSURE: 118 MMHG | BODY MASS INDEX: 34.36 KG/M2 | HEIGHT: 70 IN

## 2022-08-01 PROCEDURE — 99204 OFFICE O/P NEW MOD 45 MIN: CPT

## 2022-08-01 RX ORDER — SILDENAFIL CITRATE 100 MG/1
100 TABLET, FILM COATED ORAL
Qty: 3 | Refills: 0 | Status: DISCONTINUED | COMMUNITY
Start: 2017-02-13 | End: 2022-08-01

## 2022-08-01 NOTE — HISTORY OF PRESENT ILLNESS
[FreeTextEntry1] : Very pleasant 61-year old gentleman who presents for follow up of ED and microscopic.  He was previously found to have microscopic hematuria on urinalysis and advised undergoing work-up for this.  He reports that he did not do so at that time, and reports that he does not wish to undergoing work-up for microscopic hematuria at this time.  Patient reports no history of gross hematuria.  No flank pain. No suprapubic pain. No dysuria.  No urinary frequency, urgency.  Nocturia x 0-1.  He continues to report erectile dysfunction.  He reports that he tried Viagra in the past, however this caused headaches.  He is interested in trying other options for erectile dysfunction.  He reports rigidity and tumescence of 0 out of 10.

## 2022-08-01 NOTE — ASSESSMENT
[FreeTextEntry1] : Very pleasant 61-year-old gentleman who presents for follow-up of microscopic hematuria, erectile dysfunction\par -I again advised him to undergoing work-up for microscopic hematuria, we discussed potential reasons for microscopic hematuria, including malignancy.  At this time he wishes to forego microscopic hematuria.\par -We had an extensive discussion regarding possible management options for erectile dysfunction, including PDE 5 inhibitors, JONATHAN, ICI, intraurethral alprostadil, penile prosthesis\par -After a thorough discussion of the risks and benefits of the aforementioned options he would like to try a trial of a PDE 5 inhibitor\par -Trial of Cialis 20 mg\par -I discussed the risks, benefits, alternatives, and possible side effects of Cialis (tadalafil) therapy with the patient, including but not limited to headache, flushing, upset stomach, blurry vision, change in color vision, vision loss, and priapism with the patient.\par -PSA 12/2021 was 0.47\par -Creatinine 1.46\par -Hemoglobin A1c 12.7%.  We discussed very poorly controlled diabetes and potential complications that can arise.  Poorly controlled diabetes.  We discussed the incidence of erectile dysfunction and patient is very poorly controlled diabetes I encouraged him to follow-up with his primary care physician regarding ongoing management of diabetes\par -Follow-up in 1 month

## 2022-08-29 ENCOUNTER — APPOINTMENT (OUTPATIENT)
Dept: UROLOGY | Facility: CLINIC | Age: 62
End: 2022-08-29

## 2022-08-29 VITALS
TEMPERATURE: 97.1 F | BODY MASS INDEX: 35.73 KG/M2 | SYSTOLIC BLOOD PRESSURE: 105 MMHG | DIASTOLIC BLOOD PRESSURE: 64 MMHG | OXYGEN SATURATION: 96 % | HEART RATE: 94 BPM | WEIGHT: 249 LBS | RESPIRATION RATE: 12 BRPM

## 2022-08-29 VITALS — OXYGEN SATURATION: 99 %

## 2022-08-29 DIAGNOSIS — R31.29 OTHER MICROSCOPIC HEMATURIA: ICD-10-CM

## 2022-08-29 PROCEDURE — 99214 OFFICE O/P EST MOD 30 MIN: CPT

## 2022-08-29 NOTE — ASSESSMENT
[FreeTextEntry1] : Very pleasant 61-year-old gentleman who presents for follow-up of microscopic hematuria, erectile dysfunction, screening for prostate cancer\par -PSA 12/2021 was 0.47\par -Creatinine 1.46\par -Hemoglobin A1c 12.7%\par -Stop Cialis as this has not helped.\par -We had an extensive discussion regarding alternative management options for erectile dysfunction, including PDE 5 inhibitors, JONATHAN, ICI, intraurethral alprostadil, penile prosthesis\par -I recommended he see a specialist in mens health at this time given poor response with PDE-5 inhibitors\par -I have provided him with a reference to do so.\par

## 2022-08-29 NOTE — HISTORY OF PRESENT ILLNESS
[FreeTextEntry1] : Very pleasant 61-year old gentleman who presents for follow up of ED and microscopic hematuria.  He was previously found to have microscopic hematuria on urinalysis and advised undergoing work-up for this.  He reports that he did not do so at that time, and reports that he does not wish to undergoing work-up for microscopic hematuria at this time.  Patient reports no history of gross hematuria.  No flank pain. No suprapubic pain. No dysuria.  No urinary frequency, urgency.  Nocturia x 0-1.  He continues to report erectile dysfunction.  He reports that he tried Viagra in the past, however this caused headaches.  Because of this he was started on Cialis at his last visit.  He previously reported rigidity and tumescence of 0 out of 10. On Cialis this improved to 1-2/10, still unable to have intercourse.

## 2022-10-11 ENCOUNTER — APPOINTMENT (OUTPATIENT)
Dept: INTERNAL MEDICINE | Facility: CLINIC | Age: 62
End: 2022-10-11

## 2022-10-11 PROCEDURE — 99214 OFFICE O/P EST MOD 30 MIN: CPT | Mod: 95

## 2022-10-11 NOTE — PLAN
[FreeTextEntry1] : Follow-up \par \par \par HTN/HLD/CAD\par Low sodium, low cholesterol diet/ increased physical activity\par Cont Coreg 3.125 mg twice daily\par cont Lisinopril 20 mg daily\par cont Lasix 40 mg daily\par cont Brilinta 60 mg twice daily\par \par Diabetes\par Reinforced Low carb, low sugar diet/ increased physical activity\par cont to  monitor blood sugars at home \par cont  Lantus 23 unit qhs \par Metformin 500 mg twice daily \par Endocrine follow up\par \par pt instructed to schedule appointment for follow-up on blood work

## 2022-10-11 NOTE — HISTORY OF PRESENT ILLNESS
[Home] : at home, [unfilled] , at the time of the visit. [Medical Office: ([facility])___] : at the medical office located in  [de-identified] : 61 year old male with h/o DM/Hypertension/ Hyperlipidemia / CAD with stents presents for follow-up \par He is away in [place] currently run out of his medication.\par He is not compliant with diet/ exercise/ daily home glucose monitoring \par \par Denies CP, SOB, HA, N/V or abdominal pain

## 2022-10-17 RX ORDER — PAPAVER/PHENTOLAMINE/ALPROSTAD 150-5-50
150-5-50 VIAL (EA) INTRACAVERNOSAL
Qty: 1 | Refills: 0 | Status: ACTIVE | COMMUNITY
Start: 2022-10-17 | End: 1900-01-01

## 2022-10-25 ENCOUNTER — APPOINTMENT (OUTPATIENT)
Dept: INTERNAL MEDICINE | Facility: CLINIC | Age: 62
End: 2022-10-25

## 2022-10-25 ENCOUNTER — APPOINTMENT (OUTPATIENT)
Dept: UROLOGY | Facility: CLINIC | Age: 62
End: 2022-10-25

## 2022-10-25 VITALS
HEART RATE: 95 BPM | TEMPERATURE: 96.9 F | BODY MASS INDEX: 34.65 KG/M2 | SYSTOLIC BLOOD PRESSURE: 110 MMHG | RESPIRATION RATE: 16 BRPM | DIASTOLIC BLOOD PRESSURE: 71 MMHG | HEIGHT: 70 IN | WEIGHT: 242 LBS | OXYGEN SATURATION: 99 %

## 2022-10-25 VITALS
OXYGEN SATURATION: 98 % | SYSTOLIC BLOOD PRESSURE: 106 MMHG | HEART RATE: 97 BPM | BODY MASS INDEX: 34.65 KG/M2 | RESPIRATION RATE: 12 BRPM | WEIGHT: 242 LBS | TEMPERATURE: 96.8 F | HEIGHT: 70 IN | DIASTOLIC BLOOD PRESSURE: 71 MMHG

## 2022-10-25 DIAGNOSIS — R05.9 COUGH, UNSPECIFIED: ICD-10-CM

## 2022-10-25 DIAGNOSIS — N52.9 MALE ERECTILE DYSFUNCTION, UNSPECIFIED: ICD-10-CM

## 2022-10-25 PROCEDURE — 99214 OFFICE O/P EST MOD 30 MIN: CPT

## 2022-10-25 PROCEDURE — 99215 OFFICE O/P EST HI 40 MIN: CPT

## 2022-10-25 RX ORDER — PAPAVER/PHENTOLAMINE/ALPROSTAD 150-5-50
150-5-50 VIAL (EA) INTRACAVERNOSAL
Qty: 1 | Refills: 0 | Status: ACTIVE | COMMUNITY
Start: 2022-10-25 | End: 1900-01-01

## 2022-10-25 NOTE — ASSESSMENT
[FreeTextEntry1] : ED\par -  HbA1c 12.7\par - Testosterone level\par - Lipid profile/CRP\par - Cialis 5mg qd and 20mg TIW\par \par Start trimix at 35 units \par will send needles and quad mix as severe venous leak present \par \par rec aggressive DM and weight control \par \par The following was discussed with the patient. Erectile dysfunction can affect all ages. Normal quality of erections depends on normal flow of blood through the cavernosal arteries, distention of the corpora cavernosa and closure of the veins draining the penis. I compared getting erectile dysfunction to the feeling of the sink with the force of being in the artery and drain being a vein. This helped the patient understand the physiology of erections. Normal erection is regulated by sensory input from the penis as well as normal arousal and processing of the sexual cues. Complex interplay between the neuroendocrine system and autonomic nervous system as well as sensory input was discussed.\par \par Most common reasons for erectile dysfunction in younger men are performance anxiety, sexual experience, lack of point of reference, and realistic expectations. Congenital abnormalities of vessels can also be identified. They typically present with inability to sustain erection from late teenager to early 20s. Will call with venous leak.\par \par In older men erectile dysfunction can be due to atherosclerosis, elevated cholesterol, low testosterone, diabetes, injury to autonomic nervous system, loss of sensation, abnormal processing of sexual cues and disorder of arousal.\par \par Obesity, smoking, use of statin medication can negatively affect erectile function.\par \par Increasing exercise, healthy eating habits, getting adequate amount of sleep, all of those can be helpful in attaining normal erections.\par \par Treatment typically will start with just oral medication with medication like Cialis or Viagra. Use of those medications was explained to the patient. Difference between on demand and daily use was discussed. Viagra needs to be taken on an empty stomach.\par \par Mechanism of action of erectile dysfunction medication was also discussed.\par \par In men who have failed to respond to oral therapy or who have inadequate response level we then moved to penile Doppler ultrasound after penile injection.\par \par Injection of vasculogenic medication, specifically combination of papaverine, phentolamine and prostaglandin E1 is necessary to achieve adequate erection during penile Doppler ultrasound. If penile Doppler ultrasound is scheduled then a Trimix prescription is sent to the pharmacy and the patient is made aware that he needs to bring the medication on the day of the procedure as we cannot start the medication.\par \par Risks of Trimix injection as well as penile Doppler ultrasound like prolonged erection from the cavernosal injection of Trimix or embarrassment during the penile Doppler ultrasound were discussed with the patient.\par \par Depending on the results of penile Doppler ultrasound we may recommend that the patient see his cardiologist to have cardiac evaluation especially if blood flow through the cavernosal arteries is abnormal.\par \par If venous leak was found or arteriovenous shunting was identified we discussed with patient management of these conditions. Specifically venous leak can be sometimes corrected by modified venous stripping using Juarez technique. If everything fails we can place a penile prosthesis however this is the last choice and last step in treating erectile dysfunction.\par \par Use of self injection is often recommended after a patient achieves adequate erection during the penile Doppler ultrasound.\par \par Correction of high hemoglobin A1c, high lipids, low testosterone and cessation of smoking are always recommended.\par \par Prescriptions were given. Side effects were explained. Patient will follow-up with me as scheduled. Risks of priapism explained. \par \par \par Intracavernosal penile injection teaching.\par Patient is here for the penile injection teaching.  He failed oral therapy for erectile dysfunction.\par \par We reviewed risks, benefits, alternatives, complications and the procedure itself.\par \par Specifically we discussed with the patient that the medication used for intracavernosal injection therapy consists of 3 or 4 different medication, specifically prostaglandin E1, papaverine, phentolamine, and occasionally very low dose of atropine.  The medication for penile injection therapy is prepared by the pharmacy.  Only special pharmacy is prepared at medication.  Each of the medication dilates vessels in the penis using different mechanisms.  Only one of the components of that preparation is approved by FDA for penile injection therapy however it has to be used in higher dose which can increase pain and intracavernosal scarring.  The approved component of the Trimix is prostaglandin E1.  It is also known as Caverject.\par \par The medication is injected by the patient in the shaft of the penis on the left or right side.  The appropriate site of the infection injection was demonstrated on the model of the penis.  Patient should not inject into the dorsal aspect of the penis or underneath the penis as he may inject into the vessels or urethra.\par \par The risk of prolonged correction, also numbness priapism was explained to the patient.  If patient has erection more than 2 hours he should take from 2-4 Sudafed's of 30 mg each total 120 mg.  Maximum dose of Sudafed per days 240 mg.  If the erection pressure persists still after Sudafed he needs to contact our office within working hours or go to the nearest ER.\par \par Patient should never have erection longer than 4 hours.  Irreversible damage to the penis can occur if priapism persists.  Patient understood the risk of priapism.\par \par Prescription was given.  Prescription for syringes was given as well.  Patient will contact us with any questions.  \par \par \par \par Total time spent 42 min\par This includes time spent face to face discussing risk, complications, and benefits of each of the therapies and triggers to return to office.\par Discussed goals of therapy, realistic expectations, and alternatives.\par Reviewing pertinent notes, lab results, imaging studies, coordinating care, entering orders and preparing note.\par

## 2022-10-25 NOTE — HISTORY OF PRESENT ILLNESS
[FreeTextEntry1] : 61M w/ complaints of ED.\par \par Risks: h/o Diabetes/Hypertension/ Hyperlipidemia / CAD with stents\par \par \par Has had a poor response to oral medications \par \par DM not controlled \par Overweight \par HgA1c 12 %\par \par \par \par

## 2022-10-26 LAB
CHOLEST SERPL-MCNC: 119 MG/DL
CRP SERPL HS-MCNC: 34.58 MG/L
HDLC SERPL-MCNC: 24 MG/DL
LDLC SERPL CALC-MCNC: 54 MG/DL
NONHDLC SERPL-MCNC: 95 MG/DL
TRIGL SERPL-MCNC: 203 MG/DL

## 2022-10-31 LAB
APO LP(A) SERPL-MCNC: 218.3 NMOL/L
TESTOST FREE SERPL-MCNC: 15.5 PG/ML
TESTOST SERPL-MCNC: 401 NG/DL

## 2022-11-04 ENCOUNTER — APPOINTMENT (OUTPATIENT)
Dept: INTERNAL MEDICINE | Facility: CLINIC | Age: 62
End: 2022-11-04

## 2022-11-07 NOTE — PHYSICAL EXAM
Chief Complaint  Viky Swan presents to [facility] for Fall (Fell of porch last Monday, hit head, broken tail bone, went to [facility] )    Subjective          History of Present Illness    Viky is here today for follow up after ER visit at [facility] 7 days ago. She had slipped and fell. Had hit her head. No LOC. Normal CT head in ER. Had headaches but that has improved. Knot on back of head has improved. Still with some tail bone pain. Imaging on tail bone was not performed in the ER due to not having insurance at the time. Notes continued tailbone pain and bruising. Denies urinary incontinence.      Review of Systems      Allergies   Allergen Reactions   • Novocain [Procaine] Unknown - Low Severity      Past Medical History:   Diagnosis Date   • Dysthymic disorder    • Essential (primary) hypertension    • Hypercalcemia    • Hypothyroidism, unspecified    • Mixed hyperlipidemia    • Obesity, unspecified    • Other specified menopausal and perimenopausal disorders    • Vitamin B deficiency, unspecified    • Vitamin D deficiency, unspecified      Current Outpatient Medications   Medication Sig Dispense Refill   • aspirin 81 MG EC tablet Take 81 mg by mouth Daily.     • buPROPion SR (WELLBUTRIN SR) 150 MG 12 hr tablet Take 1 tablet by mouth 2 (Two) Times a Day. 180 tablet 1   • Calcium Carbonate 1500 (600 Ca) MG tablet TAKE 1 TABLET DAILY 90 tablet 3   • diclofenac (VOLTAREN) 50 MG EC tablet TAKE 1 TABLET BY MOUTH TWICE DAILY AS NEEDED FOR PAIN 180 tablet 0   • glimepiride (Amaryl) 1 MG tablet Take 1 tablet by mouth Every Morning Before Breakfast. 90 tablet 1   • glucose blood test strip Use as instructed 100 each 0   • glucose monitoring kit (FREESTYLE) monitoring kit 1 each Daily. 1 each 0   • hydroCHLOROthiazide (HYDRODIURIL) 12.5 MG tablet TAKE 1 TABLET DAILY 90 tablet 1   • Lancets (freestyle) lancets USE AS DIRECTED TO TEST BLOOD SUGAR 100 each 0   • levothyroxine (SYNTHROID,  "LEVOTHROID) 50 MCG tablet Take 1 tablet by mouth Daily. 90 tablet 1   • losartan (COZAAR) 50 MG tablet TAKE 1 TABLET DAILY 90 tablet 1   • metFORMIN ER (GLUCOPHAGE-XR) 500 MG 24 hr tablet Take 2 tablets by mouth 2 (Two) Times a Day. 360 tablet 1   • multivitamin with minerals tablet tablet Take 1 tablet by mouth Daily.     • Omega-3 Fatty Acids (fish oil) 1000 MG capsule capsule Take  by mouth Daily With Breakfast.     • rosuvastatin (CRESTOR) 10 MG tablet TAKE 1 TABLET DAILY 90 tablet 3   • SITagliptin (Januvia) 100 MG tablet Take 1 tablet by mouth Daily. 90 tablet 1   • triazolam (HALCION) 0.25 MG tablet At Night As Needed.     • vitamin D3 125 MCG (5000 UT) capsule capsule Take 1 capsule by mouth Daily.       No current facility-administered medications for this visit.     Past Surgical History:   Procedure Laterality Date   • TUBAL ABDOMINAL LIGATION        Social History     Tobacco Use   • Smoking status: Never   • Smokeless tobacco: Never   Vaping Use   • Vaping Use: Never used   Substance Use Topics   • Alcohol use: Not Currently   • Drug use: Never     Family History   Problem Relation Age of Onset   • Lung cancer Father    • Hypertension Sister      Health Maintenance Due   Topic Date Due   • DIABETIC FOOT EXAM  10/22/2022      Immunization History   Administered Date(s) Administered   • COVID-19 (MODERNA) 1st, 2nd, 3rd Dose Only 01/27/2021, 02/24/2021, 12/22/2021   • FluLaval/Fluzone >6mos 10/19/2021, 10/13/2022   • Hepatitis A 05/07/2018, 05/18/2018, 11/30/2018, 01/01/2019   • Influenza, Unspecified 10/21/2020   • Pneumococcal Polysaccharide (PPSV23) 10/29/2021   • Td 10/30/2003   • Tdap 07/19/2019        Objective     Vitals:    11/07/22 0753   BP: 115/74   BP Location: Left arm   Patient Position: Sitting   Pulse: 83   Temp: 98.1 °F (36.7 °C)   TempSrc: Oral   Weight: 94.3 kg (208 lb)   Height: 149.9 cm (59\")     Body mass index is 42.01 kg/m².     Physical Exam  Vitals reviewed.   Constitutional:      "  General: She is not in acute distress.     Appearance: Normal appearance. She is well-developed.   Cardiovascular:      Rate and Rhythm: Normal rate.   Pulmonary:      Effort: Pulmonary effort is normal.   Musculoskeletal:         General: Tenderness (tailbone ) present.   Skin:     Comments: Small palpable tender knot to back of right side of head   Neurological:      Mental Status: She is alert and oriented to person, place, and time.   Psychiatric:         Mood and Affect: Mood and affect normal.           Result Review :     The following data was reviewed by: AYAD Mobley on 11/07/2022:               CT Head Without Contrast (10/31/2022 09:25)             Assessment and Plan      Diagnoses and all orders for this visit:    1. Sacral back pain (Primary)  Comments:  Will have xray at [place]. Heat/ice. Sit on pillow. Will take time to heal.   Orders:  -     XR Sacrum & Coccyx; Future    2. Fall, subsequent encounter  Comments:  CT head normal. Symptoms improving.               Follow Up     Return for As needed for persistent or worsening symptoms.              [No Acute Distress] : no acute distress [Well Nourished] : well nourished [Well Developed] : well developed [Well-Appearing] : well-appearing [Normal Sclera/Conjunctiva] : normal sclera/conjunctiva [EOMI] : extraocular movements intact [Normal Outer Ear/Nose] : the outer ears and nose were normal in appearance [Normal Oropharynx] : the oropharynx was normal [Normal TMs] : both tympanic membranes were normal [No JVD] : no jugular venous distention [Supple] : supple [No Lymphadenopathy] : no lymphadenopathy [No Respiratory Distress] : no respiratory distress  [Clear to Auscultation] : lungs were clear to auscultation bilaterally [No Accessory Muscle Use] : no accessory muscle use [Normal Rate] : normal rate  [Regular Rhythm] : with a regular rhythm [Normal S1, S2] : normal S1 and S2 [No Murmur] : no murmur heard [No Carotid Bruits] : no carotid bruits [Pedal Pulses Present] : the pedal pulses are present [No Edema] : there was no peripheral edema [Soft] : abdomen soft [Non Tender] : non-tender [Non-distended] : non-distended [No Masses] : no abdominal mass palpated [No HSM] : no HSM [Normal Bowel Sounds] : normal bowel sounds [Normal Posterior Cervical Nodes] : no posterior cervical lymphadenopathy [Normal Anterior Cervical Nodes] : no anterior cervical lymphadenopathy [No CVA Tenderness] : no CVA  tenderness [No Spinal Tenderness] : no spinal tenderness [No Joint Swelling] : no joint swelling [Grossly Normal Strength/Tone] : grossly normal strength/tone [No Rash] : no rash [Normal Gait] : normal gait [No Focal Deficits] : no focal deficits [Normal Affect] : the affect was normal [Normal Insight/Judgement] : insight and judgment were intact [de-identified] : + left foot tenderness on palpation

## 2022-11-22 ENCOUNTER — APPOINTMENT (OUTPATIENT)
Dept: CARDIOLOGY | Facility: CLINIC | Age: 62
End: 2022-11-22

## 2022-11-25 ENCOUNTER — APPOINTMENT (OUTPATIENT)
Dept: INTERNAL MEDICINE | Facility: CLINIC | Age: 62
End: 2022-11-25

## 2022-11-25 VITALS
HEART RATE: 85 BPM | RESPIRATION RATE: 16 BRPM | HEIGHT: 70 IN | BODY MASS INDEX: 35.65 KG/M2 | TEMPERATURE: 97.6 F | WEIGHT: 249 LBS | DIASTOLIC BLOOD PRESSURE: 81 MMHG | OXYGEN SATURATION: 94 % | SYSTOLIC BLOOD PRESSURE: 131 MMHG

## 2022-11-25 PROCEDURE — 99214 OFFICE O/P EST MOD 30 MIN: CPT

## 2022-11-25 NOTE — PHYSICAL EXAM
[No Acute Distress] : no acute distress [Well Nourished] : well nourished [Well Developed] : well developed [Well-Appearing] : well-appearing [Normal Sclera/Conjunctiva] : normal sclera/conjunctiva [PERRL] : pupils equal round and reactive to light [EOMI] : extraocular movements intact [Normal Outer Ear/Nose] : the outer ears and nose were normal in appearance [Normal Oropharynx] : the oropharynx was normal [Supple] : supple [No Respiratory Distress] : no respiratory distress  [No Accessory Muscle Use] : no accessory muscle use [Clear to Auscultation] : lungs were clear to auscultation bilaterally [Normal Rate] : normal rate  [Regular Rhythm] : with a regular rhythm [Normal S1, S2] : normal S1 and S2 [No Murmur] : no murmur heard [No Carotid Bruits] : no carotid bruits [No Abdominal Bruit] : a ~M bruit was not heard ~T in the abdomen [No Varicosities] : no varicosities [Pedal Pulses Present] : the pedal pulses are present [No Edema] : there was no peripheral edema [No Palpable Aorta] : no palpable aorta [No Extremity Clubbing/Cyanosis] : no extremity clubbing/cyanosis [Soft] : abdomen soft [Non Tender] : non-tender [Non-distended] : non-distended [No Masses] : no abdominal mass palpated [No HSM] : no HSM [Normal Bowel Sounds] : normal bowel sounds [Normal Posterior Cervical Nodes] : no posterior cervical lymphadenopathy [Normal Anterior Cervical Nodes] : no anterior cervical lymphadenopathy [No CVA Tenderness] : no CVA  tenderness [No Spinal Tenderness] : no spinal tenderness [No Joint Swelling] : no joint swelling [Grossly Normal Strength/Tone] : grossly normal strength/tone [No Rash] : no rash [Coordination Grossly Intact] : coordination grossly intact [No Focal Deficits] : no focal deficits [Normal Gait] : normal gait [Normal Affect] : the affect was normal [Normal Insight/Judgement] : insight and judgment were intact

## 2022-11-28 NOTE — HISTORY OF PRESENT ILLNESS
[de-identified] : 61 year old male with h/o Diabetes/Hypertension/ Hyperlipidemia / CAD with stents presents for follow-up \par \par Patient reports he has been experiencing a dry cough for the last 5-6 days. Denies any fevers, chills, sore throat, chest pain or shortness of breath . He start Z pack 4 days ago \par DM > on Lantus 23 U QHS/ Metformin \par \par Also patient wants to discuss stopping Metformin 500 mg BID/ Glipizide

## 2022-11-28 NOTE — PLAN
[FreeTextEntry1] : Follow-up \par \par Cough \par  Tessalon Perles 200 mg 2-3 times daily PRN \par Refilled Albuterol inhaler \par \par HTN/HLD/CAD\par Follows with Dr. Quispe \par Low sodium, low cholesterol diet/ increased physical activity\par Cont Coreg 3.125 mg twice daily\par cont Lisinopril 20 mg daily\par cont Lasix 40 mg daily\par cont Brilinta 60 mg twice daily\par \par Diabetes\par Reinforced Low carb, low sugar diet/ increased physical activity\par cont to  monitor blood sugars at home \par cont Lantus 23 unit qhs / Metformin/ Glipizide \par

## 2022-11-28 NOTE — HISTORY OF PRESENT ILLNESS
[de-identified] : 62 year old male with h/o Diabetes/Hypertension/ Hyperlipidemia / CAD with stents presents for follow-up visit and medication renewal. He has been feeling well, denies any chest pain, dyspnea, headaches/dizziness, N/V/ abdominal pain.\par Reports good glycemic control at home.

## 2022-11-28 NOTE — PLAN
[FreeTextEntry1] : \par 1. CAD/HTN/HLD\par follow up with cardiology\par continue Brilinta 60mg BID\par continue Aspirin 81mg daily\par Carvedilol 3.125mg BID\par Lasix 40mg daily\par Lisinopril 20mg daily\par Rosuvastatin 40mg daily\par \par 2. Diabetes\par cont current medications\par low carb/ low sugar diet\par cont. Metformin 500mg daily\par Lantus 20 Units nightly\par Glipizide 10mg daily\par \par complete blood work today\par \par F/U to discuss lab results

## 2022-11-29 LAB
25(OH)D3 SERPL-MCNC: 25.8 NG/ML
ALBUMIN SERPL ELPH-MCNC: 4.4 G/DL
ALP BLD-CCNC: 102 U/L
ALT SERPL-CCNC: 17 U/L
ANION GAP SERPL CALC-SCNC: 12 MMOL/L
APPEARANCE: CLEAR
AST SERPL-CCNC: 18 U/L
BASOPHILS # BLD AUTO: 0.11 K/UL
BASOPHILS NFR BLD AUTO: 1.3 %
BILIRUB SERPL-MCNC: 0.4 MG/DL
BILIRUBIN URINE: NEGATIVE
BLOOD URINE: NEGATIVE
BUN SERPL-MCNC: 30 MG/DL
CALCIUM SERPL-MCNC: 9.6 MG/DL
CHLORIDE SERPL-SCNC: 98 MMOL/L
CHOLEST SERPL-MCNC: 127 MG/DL
CO2 SERPL-SCNC: 26 MMOL/L
COLOR: NORMAL
CREAT SERPL-MCNC: 1.66 MG/DL
EGFR: 46 ML/MIN/1.73M2
EOSINOPHIL # BLD AUTO: 0.13 K/UL
EOSINOPHIL NFR BLD AUTO: 1.6 %
ESTIMATED AVERAGE GLUCOSE: 266 MG/DL
GLUCOSE QUALITATIVE U: ABNORMAL
GLUCOSE SERPL-MCNC: 352 MG/DL
HBA1C MFR BLD HPLC: 10.9 %
HCT VFR BLD CALC: 38.9 %
HDLC SERPL-MCNC: 30 MG/DL
HGB BLD-MCNC: 12.9 G/DL
IMM GRANULOCYTES NFR BLD AUTO: 0.2 %
KETONES URINE: NEGATIVE
LDLC SERPL CALC-MCNC: 56 MG/DL
LEUKOCYTE ESTERASE URINE: NEGATIVE
LYMPHOCYTES # BLD AUTO: 2.46 K/UL
LYMPHOCYTES NFR BLD AUTO: 30.2 %
MAN DIFF?: NORMAL
MCHC RBC-ENTMCNC: 28.2 PG
MCHC RBC-ENTMCNC: 33.2 GM/DL
MCV RBC AUTO: 85.1 FL
MONOCYTES # BLD AUTO: 0.47 K/UL
MONOCYTES NFR BLD AUTO: 5.8 %
NEUTROPHILS # BLD AUTO: 4.96 K/UL
NEUTROPHILS NFR BLD AUTO: 60.9 %
NITRITE URINE: NEGATIVE
NONHDLC SERPL-MCNC: 97 MG/DL
PH URINE: 5.5
PLATELET # BLD AUTO: 234 K/UL
POTASSIUM SERPL-SCNC: 4.8 MMOL/L
PROT SERPL-MCNC: 7.5 G/DL
PROTEIN URINE: NORMAL
RBC # BLD: 4.57 M/UL
RBC # FLD: 13.1 %
SODIUM SERPL-SCNC: 135 MMOL/L
SPECIFIC GRAVITY URINE: 1.01
TRIGL SERPL-MCNC: 206 MG/DL
TSH SERPL-ACNC: 0.94 UIU/ML
UROBILINOGEN URINE: NORMAL
VIT B12 SERPL-MCNC: 584 PG/ML
WBC # FLD AUTO: 8.15 K/UL

## 2022-12-14 ENCOUNTER — NON-APPOINTMENT (OUTPATIENT)
Age: 62
End: 2022-12-14

## 2022-12-14 ENCOUNTER — APPOINTMENT (OUTPATIENT)
Dept: CARDIOLOGY | Facility: CLINIC | Age: 62
End: 2022-12-14

## 2022-12-14 VITALS
DIASTOLIC BLOOD PRESSURE: 80 MMHG | OXYGEN SATURATION: 97 % | HEIGHT: 70 IN | BODY MASS INDEX: 36.08 KG/M2 | SYSTOLIC BLOOD PRESSURE: 127 MMHG | HEART RATE: 102 BPM | WEIGHT: 252 LBS | RESPIRATION RATE: 12 BRPM

## 2022-12-14 DIAGNOSIS — R06.09 OTHER FORMS OF DYSPNEA: ICD-10-CM

## 2022-12-14 PROCEDURE — 93000 ELECTROCARDIOGRAM COMPLETE: CPT

## 2022-12-14 PROCEDURE — 99215 OFFICE O/P EST HI 40 MIN: CPT | Mod: 25

## 2022-12-14 NOTE — REVIEW OF SYSTEMS
[Weight Loss (___ Lbs)] : [unfilled] ~Ulb weight loss [SOB] : shortness of breath [Dyspnea on exertion] : dyspnea during exertion [Negative] : Heme/Lymph [Chest Discomfort] : no chest discomfort [Lower Ext Edema] : no extremity edema [Leg Claudication] : no intermittent leg claudication [Palpitations] : no palpitations [Orthopnea] : no orthopnea [PND] : no PND [Syncope] : no syncope

## 2022-12-14 NOTE — DISCUSSION/SUMMARY
[FreeTextEntry1] : The patient is a 62-year-old gentleman a history of diabetes mellitus, hypertension, hyperlipidemia, CAD EF=40% whose diabetes not well controlled and dyspnea. \par #1 CV- dyspnea but euvolemic on exam, c/w brilinta 60mg bid, ECHO and nuclear stress ordered, lasix 40mg prn\par #2 Htn- continue coreg and lisinopril\par #3 Lipids- continue crestor \par #4 DM- on lantus, metformin, suboptimal control, We discussed adherence to a low fat low cholesterol, ADA diet, weight loss and regular daily exercise, consider ozempic and farxiga\par #5 APC- continue coreg\par #6 General- received COVID vaccines, regular daily walking and continued weight loss [EKG obtained to assist in diagnosis and management of assessed problem(s)] : EKG obtained to assist in diagnosis and management of assessed problem(s)

## 2022-12-14 NOTE — HISTORY OF PRESENT ILLNESS
[FreeTextEntry1] : Jp knows his sugar has been high. Worried about metformin safety. No exercise. SOB on one flight of stairs.

## 2022-12-23 ENCOUNTER — APPOINTMENT (OUTPATIENT)
Dept: INTERNAL MEDICINE | Facility: CLINIC | Age: 62
End: 2022-12-23

## 2023-01-25 ENCOUNTER — APPOINTMENT (OUTPATIENT)
Dept: CARDIOLOGY | Facility: CLINIC | Age: 63
End: 2023-01-25
Payer: COMMERCIAL

## 2023-01-25 PROCEDURE — 78452 HT MUSCLE IMAGE SPECT MULT: CPT

## 2023-01-25 PROCEDURE — 93015 CV STRESS TEST SUPVJ I&R: CPT

## 2023-01-25 PROCEDURE — 93306 TTE W/DOPPLER COMPLETE: CPT

## 2023-01-25 PROCEDURE — A9500: CPT

## 2023-02-01 ENCOUNTER — APPOINTMENT (OUTPATIENT)
Dept: CARDIOLOGY | Facility: CLINIC | Age: 63
End: 2023-02-01

## 2023-02-27 ENCOUNTER — APPOINTMENT (OUTPATIENT)
Dept: UROLOGY | Facility: CLINIC | Age: 63
End: 2023-02-27

## 2023-03-03 ENCOUNTER — APPOINTMENT (OUTPATIENT)
Dept: UROLOGY | Facility: CLINIC | Age: 63
End: 2023-03-03

## 2023-06-23 ENCOUNTER — APPOINTMENT (OUTPATIENT)
Dept: INTERNAL MEDICINE | Facility: CLINIC | Age: 63
End: 2023-06-23
Payer: COMMERCIAL

## 2023-06-23 VITALS
HEIGHT: 70 IN | RESPIRATION RATE: 12 BRPM | WEIGHT: 248 LBS | OXYGEN SATURATION: 100 % | BODY MASS INDEX: 35.5 KG/M2 | SYSTOLIC BLOOD PRESSURE: 121 MMHG | HEART RATE: 86 BPM | DIASTOLIC BLOOD PRESSURE: 76 MMHG

## 2023-06-23 DIAGNOSIS — Z00.00 ENCOUNTER FOR GENERAL ADULT MEDICAL EXAMINATION W/OUT ABNORMAL FINDINGS: ICD-10-CM

## 2023-06-23 PROCEDURE — 99396 PREV VISIT EST AGE 40-64: CPT

## 2023-06-23 RX ORDER — BENZONATATE 200 MG/1
200 CAPSULE ORAL
Qty: 20 | Refills: 0 | Status: DISCONTINUED | COMMUNITY
Start: 2022-10-25 | End: 2023-06-23

## 2023-06-23 RX ORDER — BLOOD-GLUCOSE METER
W/DEVICE KIT MISCELLANEOUS
Qty: 1 | Refills: 0 | Status: ACTIVE | COMMUNITY
Start: 2023-06-23 | End: 1900-01-01

## 2023-06-23 NOTE — PHYSICAL EXAM
[No Acute Distress] : no acute distress [Normal Sclera/Conjunctiva] : normal sclera/conjunctiva [EOMI] : extraocular movements intact [Normal Outer Ear/Nose] : the outer ears and nose were normal in appearance [Normal Oropharynx] : the oropharynx was normal [No JVD] : no jugular venous distention [No Lymphadenopathy] : no lymphadenopathy [Supple] : supple [Thyroid Normal, No Nodules] : the thyroid was normal and there were no nodules present [No Respiratory Distress] : no respiratory distress  [No Accessory Muscle Use] : no accessory muscle use [Clear to Auscultation] : lungs were clear to auscultation bilaterally [Normal Rate] : normal rate  [Regular Rhythm] : with a regular rhythm [Normal S1, S2] : normal S1 and S2 [No Murmur] : no murmur heard [Pedal Pulses Present] : the pedal pulses are present [No Edema] : there was no peripheral edema [No Extremity Clubbing/Cyanosis] : no extremity clubbing/cyanosis [Soft] : abdomen soft [Non Tender] : non-tender [Non-distended] : non-distended [No Masses] : no abdominal mass palpated [Normal Bowel Sounds] : normal bowel sounds [No CVA Tenderness] : no CVA  tenderness [No Spinal Tenderness] : no spinal tenderness [No Joint Swelling] : no joint swelling [Grossly Normal Strength/Tone] : grossly normal strength/tone [No Rash] : no rash [Coordination Grossly Intact] : coordination grossly intact [No Focal Deficits] : no focal deficits [Normal Gait] : normal gait [Normal Affect] : the affect was normal [Normal Insight/Judgement] : insight and judgment were intact

## 2023-06-24 NOTE — HISTORY OF PRESENT ILLNESS
[de-identified] : 62 year old male with h/o Diabetes/Hypertension/ Hyperlipidemia / CAD with stents presents for physical \par \par He feels well \par He is doing okay with diet and remains active. He does not check sugars at home \par Denies any CP, SOB, HA, Dizziness, N/V or abdominal pain \par Offers no complaint

## 2023-06-24 NOTE — HEALTH RISK ASSESSMENT
[Good] : ~his/her~  mood as  good [Yes] : Yes [No falls in past year] : Patient reported no falls in the past year [0] : 2) Feeling down, depressed, or hopeless: Not at all (0) [PHQ-2 Negative - No further assessment needed] : PHQ-2 Negative - No further assessment needed [Former] : Former [de-identified] : Occasion  [ColonoscopyDate] : 2-3 years ago

## 2023-06-24 NOTE — PLAN
[FreeTextEntry1] : Physical \par \par UTD with screenings \par \par \par HTN/HLD/CAD\par Follows with Dr. Quispe \par Low sodium, low cholesterol diet/ increased physical activity\par Cont Coreg 3.125 mg twice daily\par cont Lisinopril 20 mg daily\par cont Lasix 40 mg daily\par cont Brilinta 60 mg twice daily\par \par Diabetes\par Reinforced Low carb, low sugar diet/ increased physical activity\par cont to  monitor blood sugars at home \par cont Lantus 20 unit qhs \par cont Metformin 500 mg daily \par Cont Glipizide 10 mg daily \par \par CKD\par nephrology referral/ follow up\par \par Labs ordered pt to follow-up in 1 week for results

## 2023-06-26 LAB
25(OH)D3 SERPL-MCNC: 27.7 NG/ML
ALBUMIN SERPL ELPH-MCNC: 4.3 G/DL
ALP BLD-CCNC: 86 U/L
ALT SERPL-CCNC: 19 U/L
ANION GAP SERPL CALC-SCNC: 16 MMOL/L
APPEARANCE: CLEAR
AST SERPL-CCNC: 22 U/L
BACTERIA: NEGATIVE /HPF
BILIRUB SERPL-MCNC: 0.5 MG/DL
BILIRUBIN URINE: NEGATIVE
BLOOD URINE: NEGATIVE
BUN SERPL-MCNC: 29 MG/DL
CALCIUM SERPL-MCNC: 9.7 MG/DL
CAST: 0 /LPF
CHLORIDE SERPL-SCNC: 95 MMOL/L
CHOLEST SERPL-MCNC: 121 MG/DL
CO2 SERPL-SCNC: 21 MMOL/L
COLOR: YELLOW
CREAT SERPL-MCNC: 1.72 MG/DL
EGFR: 44 ML/MIN/1.73M2
EPITHELIAL CELLS: 0 /HPF
ESTIMATED AVERAGE GLUCOSE: 301 MG/DL
GLUCOSE QUALITATIVE U: >=1000 MG/DL
GLUCOSE SERPL-MCNC: 490 MG/DL
HBA1C MFR BLD HPLC: 12.1 %
HDLC SERPL-MCNC: 27 MG/DL
KETONES URINE: NEGATIVE MG/DL
LDLC SERPL CALC-MCNC: 64 MG/DL
LEUKOCYTE ESTERASE URINE: NEGATIVE
MICROSCOPIC-UA: NORMAL
NITRITE URINE: NEGATIVE
NONHDLC SERPL-MCNC: 94 MG/DL
PH URINE: 5.5
POTASSIUM SERPL-SCNC: 4.7 MMOL/L
PROT SERPL-MCNC: 8 G/DL
PROTEIN URINE: NEGATIVE MG/DL
PSA SERPL-MCNC: 0.47 NG/ML
RED BLOOD CELLS URINE: 0 /HPF
SODIUM SERPL-SCNC: 131 MMOL/L
SPECIFIC GRAVITY URINE: 1.03
TRIGL SERPL-MCNC: 154 MG/DL
TSH SERPL-ACNC: 0.8 UIU/ML
UROBILINOGEN URINE: 0.2 MG/DL
VIT B12 SERPL-MCNC: 543 PG/ML
WHITE BLOOD CELLS URINE: 0 /HPF

## 2023-06-30 ENCOUNTER — APPOINTMENT (OUTPATIENT)
Dept: INTERNAL MEDICINE | Facility: CLINIC | Age: 63
End: 2023-06-30
Payer: COMMERCIAL

## 2023-06-30 VITALS
BODY MASS INDEX: 34.65 KG/M2 | HEIGHT: 70 IN | RESPIRATION RATE: 12 BRPM | DIASTOLIC BLOOD PRESSURE: 67 MMHG | TEMPERATURE: 96.9 F | OXYGEN SATURATION: 97 % | WEIGHT: 242 LBS | HEART RATE: 92 BPM | SYSTOLIC BLOOD PRESSURE: 100 MMHG

## 2023-06-30 LAB
FERRITIN SERPL-MCNC: 53 NG/ML
IRON SATN MFR SERPL: 24 %
IRON SERPL-MCNC: 74 UG/DL
TIBC SERPL-MCNC: 308 UG/DL
UIBC SERPL-MCNC: 234 UG/DL

## 2023-06-30 PROCEDURE — 99214 OFFICE O/P EST MOD 30 MIN: CPT

## 2023-06-30 NOTE — PHYSICAL EXAM
[No Acute Distress] : no acute distress [Normal Sclera/Conjunctiva] : normal sclera/conjunctiva [EOMI] : extraocular movements intact [Normal Outer Ear/Nose] : the outer ears and nose were normal in appearance [Normal Oropharynx] : the oropharynx was normal [No JVD] : no jugular venous distention [No Lymphadenopathy] : no lymphadenopathy [Supple] : supple [Thyroid Normal, No Nodules] : the thyroid was normal and there were no nodules present [No Respiratory Distress] : no respiratory distress  [No Accessory Muscle Use] : no accessory muscle use [Clear to Auscultation] : lungs were clear to auscultation bilaterally [Normal Rate] : normal rate  [Regular Rhythm] : with a regular rhythm [Normal S1, S2] : normal S1 and S2 [No Murmur] : no murmur heard [Pedal Pulses Present] : the pedal pulses are present [No Edema] : there was no peripheral edema [No Extremity Clubbing/Cyanosis] : no extremity clubbing/cyanosis [Soft] : abdomen soft [Non Tender] : non-tender [Non-distended] : non-distended [No Masses] : no abdominal mass palpated [No HSM] : no HSM [Normal Bowel Sounds] : normal bowel sounds [No CVA Tenderness] : no CVA  tenderness [No Spinal Tenderness] : no spinal tenderness [No Joint Swelling] : no joint swelling [Grossly Normal Strength/Tone] : grossly normal strength/tone [No Rash] : no rash [Coordination Grossly Intact] : coordination grossly intact [No Focal Deficits] : no focal deficits [Normal Gait] : normal gait [Normal Affect] : the affect was normal [Normal Insight/Judgement] : insight and judgment were intact

## 2023-06-30 NOTE — PLAN
[FreeTextEntry1] : Follow-up \par \par Labs reviewed with understanding \par \par Elevated Creatinine \par Nephrology referral \par \par Vitamin D insufficiency \par Vitamin D 3 1000 units daily \par \par HTN/HLD/CAD\par Follows with Dr. Quispe \par Low sodium, low cholesterol diet/ increased physical activity\par Cont Coreg 3.125 mg twice daily\par cont Lisinopril 20 mg daily\par cont Lasix 40 mg daily\par cont Brilinta 60 mg twice daily\par \par Diabetes, poor control\par Reinforced Low carb, low sugar diet/ increased physical activity\par cont to  monitor blood sugars at home \par increase  Lantus to 30  unit qhs \par cont Metformin 500 mg daily \par Cont Glipizide 10 mg daily \par start Humalog 5 units TID with meals \par endo referral

## 2023-07-07 ENCOUNTER — APPOINTMENT (OUTPATIENT)
Dept: GASTROENTEROLOGY | Facility: CLINIC | Age: 63
End: 2023-07-07
Payer: COMMERCIAL

## 2023-07-07 ENCOUNTER — APPOINTMENT (OUTPATIENT)
Dept: INTERNAL MEDICINE | Facility: CLINIC | Age: 63
End: 2023-07-07
Payer: COMMERCIAL

## 2023-07-07 VITALS
RESPIRATION RATE: 12 BRPM | BODY MASS INDEX: 34.65 KG/M2 | DIASTOLIC BLOOD PRESSURE: 87 MMHG | HEIGHT: 70 IN | WEIGHT: 242 LBS | OXYGEN SATURATION: 100 % | HEART RATE: 75 BPM | SYSTOLIC BLOOD PRESSURE: 152 MMHG

## 2023-07-07 VITALS — SYSTOLIC BLOOD PRESSURE: 135 MMHG | DIASTOLIC BLOOD PRESSURE: 85 MMHG

## 2023-07-07 DIAGNOSIS — Z86.79 PERSONAL HISTORY OF OTHER DISEASES OF THE CIRCULATORY SYSTEM: ICD-10-CM

## 2023-07-07 DIAGNOSIS — Z86.39 PERSONAL HISTORY OF OTHER ENDOCRINE, NUTRITIONAL AND METABOLIC DISEASE: ICD-10-CM

## 2023-07-07 PROCEDURE — 99204 OFFICE O/P NEW MOD 45 MIN: CPT

## 2023-07-07 PROCEDURE — 99214 OFFICE O/P EST MOD 30 MIN: CPT

## 2023-07-07 NOTE — HISTORY OF PRESENT ILLNESS
[de-identified] : 62 year old male with h/o Diabetes/Hypertension/ Hyperlipidemia / CAD with stents presents for follow-up on diabetes \par \par He feels well \par He states he is doing well with diet and exercise \par Denies any CP, SOB, HA, Dizziness, N/V or abdominal pain \par He reports sugars are improving and FBS this morning was 151\par He takes Lantus 20 u QHS / Humalog 5 u with meals /glipizide 10 mg daily and metformin 500 mg daily.

## 2023-07-07 NOTE — PLAN
[FreeTextEntry1] : Follow-up \par \par \par Elevated Creatinine / CKD \par Nephrology referral - awaiting for appt \par will monitor renal function\par \par \par HTN/HLD/CAD\par Follows with Dr. Quispe \par Low sodium, low cholesterol diet/ increased physical activity\par Cont Coreg 3.125 mg twice daily\par cont Lisinopril 20 mg daily\par cont Lasix 40 mg daily\par cont Brilinta 60 mg twice daily\par \par Diabetes\par Reinforced Low carb, low sugar diet/ increased physical activity\par cont to  monitor blood sugars at home \par increase Lantus 25 unit qhs \par cont Metformin 500 mg daily \par Cont Glipizide 10 mg daily \par cont Humalog 5 units TID

## 2023-07-09 VITALS
BODY MASS INDEX: 34.65 KG/M2 | DIASTOLIC BLOOD PRESSURE: 85 MMHG | SYSTOLIC BLOOD PRESSURE: 135 MMHG | HEIGHT: 70 IN | WEIGHT: 242 LBS

## 2023-07-09 PROBLEM — Z86.79 HISTORY OF ESSENTIAL HYPERTENSION: Status: RESOLVED | Noted: 2023-07-09 | Resolved: 2023-07-09

## 2023-07-09 PROBLEM — Z86.39 HISTORY OF DIABETES MELLITUS: Status: RESOLVED | Noted: 2023-07-09 | Resolved: 2023-07-09

## 2023-07-09 NOTE — HISTORY OF PRESENT ILLNESS
[FreeTextEntry1] : He is a 62 year old  male referred  for  evaluation of anemia. He denies abdominal pain, heartburn or rectal bleeding. He denies NSAID use but is on Brilinta for his coronary artery stents.He had a colonoscopy 3 years ago which was normal.

## 2023-07-09 NOTE — CONSULT LETTER
[Dear  ___] : Dear  [unfilled], [Consult Letter:] : I had the pleasure of evaluating your patient, [unfilled]. [( Thank you for referring [unfilled] for consultation for _____ )] : Thank you for referring [unfilled] for consultation for [unfilled] [Please see my note below.] : Please see my note below. [Consult Closing:] : Thank you very much for allowing me to participate in the care of this patient.  If you have any questions, please do not hesitate to contact me. [Sincerely,] : Sincerely, [FreeTextEntry3] : Don\par \par Washington Vera MD\par \par Gastroenterology\par [facility]\par [facility]\par \par

## 2023-07-09 NOTE — ASSESSMENT
[FreeTextEntry1] : RENEE MOSQUERA was advised to undergo endoscopy to which he agreed. The procedure will be performed in [facility] with the assistance of an anesthesiologist. He was given a booklet distributed by the [employer] explaining the procedure in detail and he understood the risks of the procedure not limited to infection, bleeding, perforation or non- diagnosis of gastric or esophageal cancer.  He was advised that he could not drive home, if he chooses to receive sedation. Further diagnostic and treatment recommendations will be based upon the procedure and any biopsies, if they are taken. Thank you for allowing me to participate in this patients health care.\par \par \par Stop Brilinta for 3 days before  his procedure  but can stay on 81 mg aspirin.\par \par \par I spent 48 minutes with the patient and answered all of his questions

## 2023-07-11 LAB
ANION GAP SERPL CALC-SCNC: 16 MMOL/L
BUN SERPL-MCNC: 32 MG/DL
CALCIUM SERPL-MCNC: 9.9 MG/DL
CHLORIDE SERPL-SCNC: 99 MMOL/L
CO2 SERPL-SCNC: 24 MMOL/L
CREAT SERPL-MCNC: 1.89 MG/DL
EGFR: 39 ML/MIN/1.73M2
GLUCOSE SERPL-MCNC: 144 MG/DL
POTASSIUM SERPL-SCNC: 4.5 MMOL/L
SODIUM SERPL-SCNC: 139 MMOL/L

## 2023-07-19 ENCOUNTER — APPOINTMENT (OUTPATIENT)
Dept: CARDIOLOGY | Facility: CLINIC | Age: 63
End: 2023-07-19

## 2023-07-21 ENCOUNTER — APPOINTMENT (OUTPATIENT)
Dept: INTERNAL MEDICINE | Facility: CLINIC | Age: 63
End: 2023-07-21

## 2023-08-03 ENCOUNTER — INPATIENT (INPATIENT)
Facility: HOSPITAL | Age: 63
LOS: 9 days | Discharge: HOME HEALTH | DRG: 854 | End: 2023-08-13
Attending: STUDENT IN AN ORGANIZED HEALTH CARE EDUCATION/TRAINING PROGRAM | Admitting: HOSPITALIST
Payer: COMMERCIAL

## 2023-08-03 ENCOUNTER — APPOINTMENT (OUTPATIENT)
Dept: PODIATRY | Facility: CLINIC | Age: 63
End: 2023-08-03
Payer: COMMERCIAL

## 2023-08-03 VITALS
SYSTOLIC BLOOD PRESSURE: 106 MMHG | TEMPERATURE: 102 F | DIASTOLIC BLOOD PRESSURE: 67 MMHG | HEIGHT: 65 IN | WEIGHT: 199.96 LBS | HEART RATE: 110 BPM | RESPIRATION RATE: 20 BRPM | OXYGEN SATURATION: 98 %

## 2023-08-03 DIAGNOSIS — L03.90 CELLULITIS, UNSPECIFIED: ICD-10-CM

## 2023-08-03 DIAGNOSIS — I70.91 GENERALIZED ATHEROSCLEROSIS: ICD-10-CM

## 2023-08-03 DIAGNOSIS — L89.892 PRESSURE ULCER OF OTHER SITE, STAGE 2: ICD-10-CM

## 2023-08-03 DIAGNOSIS — Z98.890 OTHER SPECIFIED POSTPROCEDURAL STATES: Chronic | ICD-10-CM

## 2023-08-03 DIAGNOSIS — M43.22 FUSION OF SPINE, CERVICAL REGION: Chronic | ICD-10-CM

## 2023-08-03 DIAGNOSIS — S62.101A FRACTURE OF UNSPECIFIED CARPAL BONE, RIGHT WRIST, INITIAL ENCOUNTER FOR CLOSED FRACTURE: Chronic | ICD-10-CM

## 2023-08-03 LAB
ALBUMIN SERPL ELPH-MCNC: 4.1 G/DL — SIGNIFICANT CHANGE UP (ref 3.3–5)
ALP SERPL-CCNC: 81 U/L — SIGNIFICANT CHANGE UP (ref 40–120)
ALT FLD-CCNC: 16 U/L — SIGNIFICANT CHANGE UP (ref 10–45)
ANION GAP SERPL CALC-SCNC: 14 MMOL/L — SIGNIFICANT CHANGE UP (ref 5–17)
APPEARANCE UR: CLEAR — SIGNIFICANT CHANGE UP
APTT BLD: 29.5 SEC — SIGNIFICANT CHANGE UP (ref 24.5–35.6)
AST SERPL-CCNC: 20 U/L — SIGNIFICANT CHANGE UP (ref 10–40)
BACTERIA # UR AUTO: NEGATIVE — SIGNIFICANT CHANGE UP
BASE EXCESS BLDV CALC-SCNC: -0.1 MMOL/L — SIGNIFICANT CHANGE UP (ref -2–3)
BASOPHILS # BLD AUTO: 0.05 K/UL — SIGNIFICANT CHANGE UP (ref 0–0.2)
BASOPHILS NFR BLD AUTO: 0.5 % — SIGNIFICANT CHANGE UP (ref 0–2)
BILIRUB SERPL-MCNC: 0.6 MG/DL — SIGNIFICANT CHANGE UP (ref 0.2–1.2)
BILIRUB UR-MCNC: NEGATIVE — SIGNIFICANT CHANGE UP
BUN SERPL-MCNC: 24 MG/DL — HIGH (ref 7–23)
CA-I SERPL-SCNC: 1.2 MMOL/L — SIGNIFICANT CHANGE UP (ref 1.15–1.33)
CALCIUM SERPL-MCNC: 9.5 MG/DL — SIGNIFICANT CHANGE UP (ref 8.4–10.5)
CHLORIDE BLDV-SCNC: 102 MMOL/L — SIGNIFICANT CHANGE UP (ref 96–108)
CHLORIDE SERPL-SCNC: 103 MMOL/L — SIGNIFICANT CHANGE UP (ref 96–108)
CO2 BLDV-SCNC: 27 MMOL/L — HIGH (ref 22–26)
CO2 SERPL-SCNC: 20 MMOL/L — LOW (ref 22–31)
COLOR SPEC: SIGNIFICANT CHANGE UP
CREAT SERPL-MCNC: 1.91 MG/DL — HIGH (ref 0.5–1.3)
CRP SERPL-MCNC: 61 MG/L — HIGH (ref 0–4)
DIFF PNL FLD: NEGATIVE — SIGNIFICANT CHANGE UP
EGFR: 39 ML/MIN/1.73M2 — LOW
EOSINOPHIL # BLD AUTO: 0.09 K/UL — SIGNIFICANT CHANGE UP (ref 0–0.5)
EOSINOPHIL NFR BLD AUTO: 0.9 % — SIGNIFICANT CHANGE UP (ref 0–6)
EPI CELLS # UR: 0 /HPF — SIGNIFICANT CHANGE UP
GAS PNL BLDV: 135 MMOL/L — LOW (ref 136–145)
GAS PNL BLDV: SIGNIFICANT CHANGE UP
GLUCOSE BLDV-MCNC: 125 MG/DL — HIGH (ref 70–99)
GLUCOSE SERPL-MCNC: 129 MG/DL — HIGH (ref 70–99)
GLUCOSE UR QL: ABNORMAL
HCO3 BLDV-SCNC: 26 MMOL/L — SIGNIFICANT CHANGE UP (ref 22–29)
HCT VFR BLD CALC: 34.6 % — LOW (ref 39–50)
HCT VFR BLDA CALC: 35 % — LOW (ref 39–51)
HGB BLD CALC-MCNC: 11.8 G/DL — LOW (ref 12.6–17.4)
HGB BLD-MCNC: 11.4 G/DL — LOW (ref 13–17)
HYALINE CASTS # UR AUTO: 4 /LPF — HIGH (ref 0–2)
IMM GRANULOCYTES NFR BLD AUTO: 0.7 % — SIGNIFICANT CHANGE UP (ref 0–0.9)
INR BLD: 1.13 RATIO — SIGNIFICANT CHANGE UP (ref 0.85–1.18)
KETONES UR-MCNC: NEGATIVE — SIGNIFICANT CHANGE UP
LACTATE BLDV-MCNC: 1.5 MMOL/L — SIGNIFICANT CHANGE UP (ref 0.5–2)
LEUKOCYTE ESTERASE UR-ACNC: NEGATIVE — SIGNIFICANT CHANGE UP
LYMPHOCYTES # BLD AUTO: 2.25 K/UL — SIGNIFICANT CHANGE UP (ref 1–3.3)
LYMPHOCYTES # BLD AUTO: 21.7 % — SIGNIFICANT CHANGE UP (ref 13–44)
MCHC RBC-ENTMCNC: 28.4 PG — SIGNIFICANT CHANGE UP (ref 27–34)
MCHC RBC-ENTMCNC: 32.9 GM/DL — SIGNIFICANT CHANGE UP (ref 32–36)
MCV RBC AUTO: 86.1 FL — SIGNIFICANT CHANGE UP (ref 80–100)
MONOCYTES # BLD AUTO: 0.97 K/UL — HIGH (ref 0–0.9)
MONOCYTES NFR BLD AUTO: 9.4 % — SIGNIFICANT CHANGE UP (ref 2–14)
NEUTROPHILS # BLD AUTO: 6.93 K/UL — SIGNIFICANT CHANGE UP (ref 1.8–7.4)
NEUTROPHILS NFR BLD AUTO: 66.8 % — SIGNIFICANT CHANGE UP (ref 43–77)
NITRITE UR-MCNC: NEGATIVE — SIGNIFICANT CHANGE UP
NRBC # BLD: 0 /100 WBCS — SIGNIFICANT CHANGE UP (ref 0–0)
PCO2 BLDV: 47 MMHG — SIGNIFICANT CHANGE UP (ref 42–55)
PH BLDV: 7.35 — SIGNIFICANT CHANGE UP (ref 7.32–7.43)
PH UR: 6 — SIGNIFICANT CHANGE UP (ref 5–8)
PLATELET # BLD AUTO: 274 K/UL — SIGNIFICANT CHANGE UP (ref 150–400)
PO2 BLDV: 18 MMHG — LOW (ref 25–45)
POTASSIUM BLDV-SCNC: 4.1 MMOL/L — SIGNIFICANT CHANGE UP (ref 3.5–5.1)
POTASSIUM SERPL-MCNC: 4.1 MMOL/L — SIGNIFICANT CHANGE UP (ref 3.5–5.3)
POTASSIUM SERPL-SCNC: 4.1 MMOL/L — SIGNIFICANT CHANGE UP (ref 3.5–5.3)
PROT SERPL-MCNC: 7.7 G/DL — SIGNIFICANT CHANGE UP (ref 6–8.3)
PROT UR-MCNC: ABNORMAL
PROTHROM AB SERPL-ACNC: 12.4 SEC — SIGNIFICANT CHANGE UP (ref 9.5–13)
RBC # BLD: 4.02 M/UL — LOW (ref 4.2–5.8)
RBC # FLD: 12.6 % — SIGNIFICANT CHANGE UP (ref 10.3–14.5)
RBC CASTS # UR COMP ASSIST: 3 /HPF — SIGNIFICANT CHANGE UP (ref 0–4)
SAO2 % BLDV: 24.9 % — LOW (ref 67–88)
SODIUM SERPL-SCNC: 137 MMOL/L — SIGNIFICANT CHANGE UP (ref 135–145)
SP GR SPEC: 1.01 — SIGNIFICANT CHANGE UP (ref 1.01–1.02)
UROBILINOGEN FLD QL: NEGATIVE — SIGNIFICANT CHANGE UP
WBC # BLD: 10.36 K/UL — SIGNIFICANT CHANGE UP (ref 3.8–10.5)
WBC # FLD AUTO: 10.36 K/UL — SIGNIFICANT CHANGE UP (ref 3.8–10.5)
WBC UR QL: 1 /HPF — SIGNIFICANT CHANGE UP (ref 0–5)

## 2023-08-03 PROCEDURE — 99214 OFFICE O/P EST MOD 30 MIN: CPT | Mod: 25

## 2023-08-03 PROCEDURE — 99285 EMERGENCY DEPT VISIT HI MDM: CPT

## 2023-08-03 PROCEDURE — 73620 X-RAY EXAM OF FOOT: CPT | Mod: 26,LT

## 2023-08-03 PROCEDURE — 71045 X-RAY EXAM CHEST 1 VIEW: CPT | Mod: 26

## 2023-08-03 PROCEDURE — 73630 X-RAY EXAM OF FOOT: CPT | Mod: LT

## 2023-08-03 PROCEDURE — 99223 1ST HOSP IP/OBS HIGH 75: CPT

## 2023-08-03 RX ORDER — VANCOMYCIN HCL 1 G
1250 VIAL (EA) INTRAVENOUS ONCE
Refills: 0 | Status: COMPLETED | OUTPATIENT
Start: 2023-08-03 | End: 2023-08-03

## 2023-08-03 RX ORDER — CEFEPIME 1 G/1
1000 INJECTION, POWDER, FOR SOLUTION INTRAMUSCULAR; INTRAVENOUS ONCE
Refills: 0 | Status: COMPLETED | OUTPATIENT
Start: 2023-08-03 | End: 2023-08-03

## 2023-08-03 RX ORDER — ACETAMINOPHEN 500 MG
975 TABLET ORAL ONCE
Refills: 0 | Status: COMPLETED | OUTPATIENT
Start: 2023-08-03 | End: 2023-08-03

## 2023-08-03 RX ORDER — VANCOMYCIN HCL 1 G
1350 VIAL (EA) INTRAVENOUS ONCE
Refills: 0 | Status: DISCONTINUED | OUTPATIENT
Start: 2023-08-03 | End: 2023-08-03

## 2023-08-03 RX ORDER — SODIUM CHLORIDE 9 MG/ML
1000 INJECTION, SOLUTION INTRAVENOUS ONCE
Refills: 0 | Status: COMPLETED | OUTPATIENT
Start: 2023-08-03 | End: 2023-08-03

## 2023-08-03 RX ADMIN — SODIUM CHLORIDE 1000 MILLILITER(S): 9 INJECTION, SOLUTION INTRAVENOUS at 22:29

## 2023-08-03 RX ADMIN — SODIUM CHLORIDE 1000 MILLILITER(S): 9 INJECTION, SOLUTION INTRAVENOUS at 21:06

## 2023-08-03 RX ADMIN — Medication 100 MILLIGRAM(S): at 23:19

## 2023-08-03 RX ADMIN — CEFEPIME 100 MILLIGRAM(S): 1 INJECTION, POWDER, FOR SOLUTION INTRAMUSCULAR; INTRAVENOUS at 21:49

## 2023-08-03 RX ADMIN — CEFEPIME 1000 MILLIGRAM(S): 1 INJECTION, POWDER, FOR SOLUTION INTRAMUSCULAR; INTRAVENOUS at 22:29

## 2023-08-03 RX ADMIN — Medication 975 MILLIGRAM(S): at 21:06

## 2023-08-03 RX ADMIN — Medication 166.67 MILLIGRAM(S): at 22:17

## 2023-08-03 NOTE — ED PROVIDER NOTE - OBJECTIVE STATEMENT
62-year-old male with past medical history of HTN, HLD, DM, CAD s/p stent, CKD presenting with foot ulcer.  Patient reports that he noted an ulceration to the bottom of his left foot 1 week ago.  Over the past week patient has had worsening swelling, pain and erythema to the left foot extending up to the left lower leg.  Patient followed up with podiatry today and was instructed to come to the ER.  Patient denies fever/chills, chest pain, shortness of breath, abdominal pain, nausea, vomiting, diarrhea, dysuria.  However upon arrival to the emergency department patient was found to be tachycardic and febrile to 102.

## 2023-08-03 NOTE — H&P ADULT - HISTORY OF PRESENT ILLNESS
01/2025 TTLAURENT 62M w/ hx of CAD s/p multiple TOM 2016, IDDM2, CKD, HTN, HLD p/w L sided foot wound and chills. Pt was visiting child and grand child in [place] ~1 week ago when his family noticed L foot lateral wound when he was walking around on a deck bare foot. After coming home, pt denies fevers but does endorse some chills. Denies pain in foot but also endorses general lack of sensation in feet. Went to visit his podiatrist today who then told him to go to the ER.    In ER: Given IV Cefepime, Vancomycin, Clindamycin, LR 1L, Tylenol 1gm IVPB

## 2023-08-03 NOTE — H&P ADULT - ASSESSMENT
62M w/ hx of CAD s/p multiple TOM 2016, IDDM2, CKD, HTN, HLD p/w L sided diabetic foot wound and sepsis

## 2023-08-03 NOTE — H&P ADULT - TIME BILLING
Need to interview and examine patient, place orders, document, provide counseling, personally review imaging, ekg and review prior medical records including outpatient

## 2023-08-03 NOTE — ED PROVIDER NOTE - CLINICAL SUMMARY MEDICAL DECISION MAKING FREE TEXT BOX
Adult male pmh dm/ckd, pw c/o foot ulcer pain and fever. Concerns for osteo/cellulitis. Check, xray.labs, culture, tx abx/ivf/tylenol cs podiatry,vasc tba  Renzo Sexton MD, Facep

## 2023-08-03 NOTE — H&P ADULT - NSHPLABSRESULTS_GEN_ALL_CORE
I have reviewed the labs, imaging and ekg. CXR w/o focal consolidations, EKG with NSR HR 92 QTc 435, possible old anteroseptal infarct

## 2023-08-03 NOTE — ED PROVIDER NOTE - PROGRESS NOTE DETAILS
podiatry consulted.  Awaiting recs. Tachycardia resolved. Will admit to medicine for continued IV abx.  -Juan Wynn PA-C

## 2023-08-03 NOTE — ED PROVIDER NOTE - ATTENDING APP SHARED VISIT CONTRIBUTION OF CARE
PMD Virginia Mcmullen pcp, [facility].    62y M pmh  CAD Sp ptca w stent Anemia. HLD, CKD, DMT2 on metformin. HTN.Pt comes to ed c/o one week hx of left foot plantar wound w soreness "I don't feel much in my foot" Pt unaware of fever, no chills No cough, cp. sob, nvdc .  PE WDWN male awake alert ncat neck supple chest clear ap cv no rgm abd soft +bs neuro gcs 15 speech fluent power 5/5 all ext. Left foot swollen w ulcer left lat surface w superficial vesicle to dorsum, calf red warm  Renzo Sexton MD, Facep

## 2023-08-03 NOTE — H&P ADULT - NSICDXPASTMEDICALHX_GEN_ALL_CORE_FT
PAST MEDICAL HISTORY:  CAD (coronary artery disease)     Diabetes mellitus     Essential hypertension     Stage 3 chronic kidney disease

## 2023-08-03 NOTE — ED ADULT NURSE NOTE - CHPI ED NUR SYMPTOMS NEG
no bleeding at site/no blood in mucus/no chills/no drainage/no pain/no purulent drainage/no rectal pain/no redness/no vomiting

## 2023-08-03 NOTE — ED ADULT NURSE NOTE - OBJECTIVE STATEMENT
PT is a 62 year old A&OX4 male with PMH of DM, HTN, HLD, and CAD with cardiac stent placement on Brilinta who presents to the ED from home with c/o left foot wound. PT states his daughter noticed the wound on his foot about 1-2 weeks ago and PT went to his PCP who referred him to the ED. PT denies pain and fevers at home. PT states he has been ambulating with a limp. PT denies chest pain, SOB, N/V/D, and dizziness. PT is resting comfortably in bed, breathing unlabored on room air, and speaking in complete sentences. Abdomen is soft, non-tender, and non-distended. Skin is warm and dry, no diaphoresis noted. No edema noted to B/L extremities. Strong strength in B/L extremities, sensation intact. Wound noted to left foot, wound is clean and dry with no active bleeding or drainage noted. IV access established 18G in right AC. PT placed in hospital gown. PT ambulatory with steady gait. Safety and comfort maintained.

## 2023-08-03 NOTE — ED PROVIDER NOTE - NS ED ATTENDING STATEMENT MOD
This was a shared visit with the EULALIO. I reviewed and verified the documentation and independently performed the documented:

## 2023-08-03 NOTE — H&P ADULT - PROBLEM SELECTOR PLAN 5
S/p multiple stents in 2016  -Cont. asa and Brilinta BID  -Cont. Rosuvastatin substitution S/p multiple stents in 2016  -Cont. asa and Brilinta BID, pt has picture of 90mg dose, will cont. 60mg dose based on outpt meds  -Cont. Rosuvastatin substitution

## 2023-08-03 NOTE — H&P ADULT - PROBLEM SELECTOR PLAN 1
Meets criteria by HR and fever. Likely source is diabetic foot infection  -Cont. empiric IV antibiotics  -F/u BCxs  -F/u UCx  -Tylenol PRN  -See below regarding podiatry consult

## 2023-08-03 NOTE — ED PROVIDER NOTE - PHYSICAL EXAMINATION
Extremity: +swelling and erythema left foot, extending up the anterior aspect of the left lower leg.  +chronic appearing ulceration to lateral plantar aspect overlying left 5th metatarsal.  +bulla over anterior foot.  2+ DP and PT BL.

## 2023-08-03 NOTE — H&P ADULT - PROBLEM SELECTOR PLAN 3
On Lantus 20U QHS at home  -Will reduce to 16U QHS tonight, adjust as needed  -Fingersticks and sliding scale  -A1c

## 2023-08-03 NOTE — ED ADULT NURSE NOTE - NSFALLHARMRISKINTERV_ED_ALL_ED

## 2023-08-04 DIAGNOSIS — R09.89 OTHER SPECIFIED SYMPTOMS AND SIGNS INVOLVING THE CIRCULATORY AND RESPIRATORY SYSTEMS: ICD-10-CM

## 2023-08-04 DIAGNOSIS — I10 ESSENTIAL (PRIMARY) HYPERTENSION: ICD-10-CM

## 2023-08-04 DIAGNOSIS — A41.9 SEPSIS, UNSPECIFIED ORGANISM: ICD-10-CM

## 2023-08-04 DIAGNOSIS — E78.5 HYPERLIPIDEMIA, UNSPECIFIED: ICD-10-CM

## 2023-08-04 DIAGNOSIS — N18.30 CHRONIC KIDNEY DISEASE, STAGE 3 UNSPECIFIED: ICD-10-CM

## 2023-08-04 DIAGNOSIS — I50.22 CHRONIC SYSTOLIC (CONGESTIVE) HEART FAILURE: ICD-10-CM

## 2023-08-04 DIAGNOSIS — E11.628 TYPE 2 DIABETES MELLITUS WITH OTHER SKIN COMPLICATIONS: ICD-10-CM

## 2023-08-04 DIAGNOSIS — Z29.9 ENCOUNTER FOR PROPHYLACTIC MEASURES, UNSPECIFIED: ICD-10-CM

## 2023-08-04 DIAGNOSIS — I25.10 ATHEROSCLEROTIC HEART DISEASE OF NATIVE CORONARY ARTERY WITHOUT ANGINA PECTORIS: ICD-10-CM

## 2023-08-04 DIAGNOSIS — E11.9 TYPE 2 DIABETES MELLITUS WITHOUT COMPLICATIONS: ICD-10-CM

## 2023-08-04 LAB
ALBUMIN SERPL ELPH-MCNC: 3.7 G/DL — SIGNIFICANT CHANGE UP (ref 3.3–5)
ALP SERPL-CCNC: 72 U/L — SIGNIFICANT CHANGE UP (ref 40–120)
ALT FLD-CCNC: 12 U/L — SIGNIFICANT CHANGE UP (ref 10–45)
ANION GAP SERPL CALC-SCNC: 15 MMOL/L — SIGNIFICANT CHANGE UP (ref 5–17)
AST SERPL-CCNC: 13 U/L — SIGNIFICANT CHANGE UP (ref 10–40)
BASOPHILS # BLD AUTO: 0.05 K/UL — SIGNIFICANT CHANGE UP (ref 0–0.2)
BASOPHILS NFR BLD AUTO: 0.6 % — SIGNIFICANT CHANGE UP (ref 0–2)
BILIRUB SERPL-MCNC: 0.7 MG/DL — SIGNIFICANT CHANGE UP (ref 0.2–1.2)
BUN SERPL-MCNC: 23 MG/DL — SIGNIFICANT CHANGE UP (ref 7–23)
CALCIUM SERPL-MCNC: 8.8 MG/DL — SIGNIFICANT CHANGE UP (ref 8.4–10.5)
CHLORIDE SERPL-SCNC: 100 MMOL/L — SIGNIFICANT CHANGE UP (ref 96–108)
CO2 SERPL-SCNC: 23 MMOL/L — SIGNIFICANT CHANGE UP (ref 22–31)
CREAT SERPL-MCNC: 1.91 MG/DL — HIGH (ref 0.5–1.3)
EGFR: 39 ML/MIN/1.73M2 — LOW
EOSINOPHIL # BLD AUTO: 0.12 K/UL — SIGNIFICANT CHANGE UP (ref 0–0.5)
EOSINOPHIL NFR BLD AUTO: 1.3 % — SIGNIFICANT CHANGE UP (ref 0–6)
ERYTHROCYTE [SEDIMENTATION RATE] IN BLOOD: 96 MM/HR — HIGH (ref 0–20)
GLUCOSE BLDC GLUCOMTR-MCNC: 156 MG/DL — HIGH (ref 70–99)
GLUCOSE BLDC GLUCOMTR-MCNC: 165 MG/DL — HIGH (ref 70–99)
GLUCOSE BLDC GLUCOMTR-MCNC: 197 MG/DL — HIGH (ref 70–99)
GLUCOSE BLDC GLUCOMTR-MCNC: 227 MG/DL — HIGH (ref 70–99)
GLUCOSE BLDC GLUCOMTR-MCNC: 237 MG/DL — HIGH (ref 70–99)
GLUCOSE SERPL-MCNC: 134 MG/DL — HIGH (ref 70–99)
GRAM STN FLD: SIGNIFICANT CHANGE UP
HCT VFR BLD CALC: 30.5 % — LOW (ref 39–50)
HCV AB S/CO SERPL IA: 0.07 S/CO — SIGNIFICANT CHANGE UP (ref 0–0.99)
HCV AB SERPL-IMP: SIGNIFICANT CHANGE UP
HGB BLD-MCNC: 10.1 G/DL — LOW (ref 13–17)
IMM GRANULOCYTES NFR BLD AUTO: 0.4 % — SIGNIFICANT CHANGE UP (ref 0–0.9)
LYMPHOCYTES # BLD AUTO: 1.99 K/UL — SIGNIFICANT CHANGE UP (ref 1–3.3)
LYMPHOCYTES # BLD AUTO: 22 % — SIGNIFICANT CHANGE UP (ref 13–44)
MAGNESIUM SERPL-MCNC: 2.1 MG/DL — SIGNIFICANT CHANGE UP (ref 1.6–2.6)
MCHC RBC-ENTMCNC: 28.5 PG — SIGNIFICANT CHANGE UP (ref 27–34)
MCHC RBC-ENTMCNC: 33.1 GM/DL — SIGNIFICANT CHANGE UP (ref 32–36)
MCV RBC AUTO: 85.9 FL — SIGNIFICANT CHANGE UP (ref 80–100)
MONOCYTES # BLD AUTO: 0.9 K/UL — SIGNIFICANT CHANGE UP (ref 0–0.9)
MONOCYTES NFR BLD AUTO: 9.9 % — SIGNIFICANT CHANGE UP (ref 2–14)
NEUTROPHILS # BLD AUTO: 5.95 K/UL — SIGNIFICANT CHANGE UP (ref 1.8–7.4)
NEUTROPHILS NFR BLD AUTO: 65.8 % — SIGNIFICANT CHANGE UP (ref 43–77)
NRBC # BLD: 0 /100 WBCS — SIGNIFICANT CHANGE UP (ref 0–0)
NT-PROBNP SERPL-SCNC: 1004 PG/ML — HIGH (ref 0–300)
PLATELET # BLD AUTO: 227 K/UL — SIGNIFICANT CHANGE UP (ref 150–400)
POTASSIUM SERPL-MCNC: 3.7 MMOL/L — SIGNIFICANT CHANGE UP (ref 3.5–5.3)
POTASSIUM SERPL-SCNC: 3.7 MMOL/L — SIGNIFICANT CHANGE UP (ref 3.5–5.3)
PROT SERPL-MCNC: 6.8 G/DL — SIGNIFICANT CHANGE UP (ref 6–8.3)
RBC # BLD: 3.55 M/UL — LOW (ref 4.2–5.8)
RBC # FLD: 12.8 % — SIGNIFICANT CHANGE UP (ref 10.3–14.5)
SODIUM SERPL-SCNC: 138 MMOL/L — SIGNIFICANT CHANGE UP (ref 135–145)
SPECIMEN SOURCE: SIGNIFICANT CHANGE UP
WBC # BLD: 9.05 K/UL — SIGNIFICANT CHANGE UP (ref 3.8–10.5)
WBC # FLD AUTO: 9.05 K/UL — SIGNIFICANT CHANGE UP (ref 3.8–10.5)

## 2023-08-04 PROCEDURE — 99255 IP/OBS CONSLTJ NEW/EST HI 80: CPT

## 2023-08-04 PROCEDURE — 93923 UPR/LXTR ART STDY 3+ LVLS: CPT | Mod: 26

## 2023-08-04 PROCEDURE — 93306 TTE W/DOPPLER COMPLETE: CPT | Mod: 26

## 2023-08-04 PROCEDURE — 73720 MRI LWR EXTREMITY W/O&W/DYE: CPT | Mod: 26,LT

## 2023-08-04 PROCEDURE — 99233 SBSQ HOSP IP/OBS HIGH 50: CPT

## 2023-08-04 RX ORDER — LANOLIN ALCOHOL/MO/W.PET/CERES
3 CREAM (GRAM) TOPICAL AT BEDTIME
Refills: 0 | Status: DISCONTINUED | OUTPATIENT
Start: 2023-08-04 | End: 2023-08-13

## 2023-08-04 RX ORDER — ACETAMINOPHEN 500 MG
650 TABLET ORAL EVERY 6 HOURS
Refills: 0 | Status: DISCONTINUED | OUTPATIENT
Start: 2023-08-04 | End: 2023-08-13

## 2023-08-04 RX ORDER — INSULIN LISPRO 100/ML
VIAL (ML) SUBCUTANEOUS AT BEDTIME
Refills: 0 | Status: DISCONTINUED | OUTPATIENT
Start: 2023-08-04 | End: 2023-08-10

## 2023-08-04 RX ORDER — DEXTROSE 50 % IN WATER 50 %
12.5 SYRINGE (ML) INTRAVENOUS ONCE
Refills: 0 | Status: DISCONTINUED | OUTPATIENT
Start: 2023-08-04 | End: 2023-08-13

## 2023-08-04 RX ORDER — VANCOMYCIN HCL 1 G
1250 VIAL (EA) INTRAVENOUS EVERY 24 HOURS
Refills: 0 | Status: DISCONTINUED | OUTPATIENT
Start: 2023-08-04 | End: 2023-08-06

## 2023-08-04 RX ORDER — GLUCAGON INJECTION, SOLUTION 0.5 MG/.1ML
1 INJECTION, SOLUTION SUBCUTANEOUS ONCE
Refills: 0 | Status: DISCONTINUED | OUTPATIENT
Start: 2023-08-04 | End: 2023-08-13

## 2023-08-04 RX ORDER — ATORVASTATIN CALCIUM 80 MG/1
80 TABLET, FILM COATED ORAL AT BEDTIME
Refills: 0 | Status: DISCONTINUED | OUTPATIENT
Start: 2023-08-04 | End: 2023-08-13

## 2023-08-04 RX ORDER — INSULIN LISPRO 100/ML
VIAL (ML) SUBCUTANEOUS
Refills: 0 | Status: DISCONTINUED | OUTPATIENT
Start: 2023-08-04 | End: 2023-08-10

## 2023-08-04 RX ORDER — CEFEPIME 1 G/1
2000 INJECTION, POWDER, FOR SOLUTION INTRAMUSCULAR; INTRAVENOUS EVERY 12 HOURS
Refills: 0 | Status: DISCONTINUED | OUTPATIENT
Start: 2023-08-04 | End: 2023-08-06

## 2023-08-04 RX ORDER — METRONIDAZOLE 500 MG
500 TABLET ORAL EVERY 12 HOURS
Refills: 0 | Status: DISCONTINUED | OUTPATIENT
Start: 2023-08-04 | End: 2023-08-06

## 2023-08-04 RX ORDER — DEXTROSE 50 % IN WATER 50 %
25 SYRINGE (ML) INTRAVENOUS ONCE
Refills: 0 | Status: DISCONTINUED | OUTPATIENT
Start: 2023-08-04 | End: 2023-08-13

## 2023-08-04 RX ORDER — FUROSEMIDE 40 MG
40 TABLET ORAL DAILY
Refills: 0 | Status: DISCONTINUED | OUTPATIENT
Start: 2023-08-04 | End: 2023-08-05

## 2023-08-04 RX ORDER — SODIUM CHLORIDE 9 MG/ML
1000 INJECTION, SOLUTION INTRAVENOUS
Refills: 0 | Status: DISCONTINUED | OUTPATIENT
Start: 2023-08-04 | End: 2023-08-13

## 2023-08-04 RX ORDER — ASPIRIN/CALCIUM CARB/MAGNESIUM 324 MG
81 TABLET ORAL DAILY
Refills: 0 | Status: DISCONTINUED | OUTPATIENT
Start: 2023-08-04 | End: 2023-08-13

## 2023-08-04 RX ORDER — DEXTROSE 50 % IN WATER 50 %
15 SYRINGE (ML) INTRAVENOUS ONCE
Refills: 0 | Status: DISCONTINUED | OUTPATIENT
Start: 2023-08-04 | End: 2023-08-13

## 2023-08-04 RX ORDER — CARVEDILOL PHOSPHATE 80 MG/1
3.12 CAPSULE, EXTENDED RELEASE ORAL EVERY 12 HOURS
Refills: 0 | Status: DISCONTINUED | OUTPATIENT
Start: 2023-08-04 | End: 2023-08-13

## 2023-08-04 RX ORDER — LISINOPRIL 2.5 MG/1
20 TABLET ORAL DAILY
Refills: 0 | Status: DISCONTINUED | OUTPATIENT
Start: 2023-08-04 | End: 2023-08-13

## 2023-08-04 RX ORDER — TICAGRELOR 90 MG/1
60 TABLET ORAL EVERY 12 HOURS
Refills: 0 | Status: DISCONTINUED | OUTPATIENT
Start: 2023-08-05 | End: 2023-08-09

## 2023-08-04 RX ORDER — INSULIN GLARGINE 100 [IU]/ML
16 INJECTION, SOLUTION SUBCUTANEOUS AT BEDTIME
Refills: 0 | Status: DISCONTINUED | OUTPATIENT
Start: 2023-08-04 | End: 2023-08-05

## 2023-08-04 RX ADMIN — Medication 81 MILLIGRAM(S): at 12:50

## 2023-08-04 RX ADMIN — CEFEPIME 100 MILLIGRAM(S): 1 INJECTION, POWDER, FOR SOLUTION INTRAMUSCULAR; INTRAVENOUS at 08:24

## 2023-08-04 RX ADMIN — ATORVASTATIN CALCIUM 80 MILLIGRAM(S): 80 TABLET, FILM COATED ORAL at 21:50

## 2023-08-04 RX ADMIN — Medication 975 MILLIGRAM(S): at 00:45

## 2023-08-04 RX ADMIN — Medication 2: at 12:50

## 2023-08-04 RX ADMIN — Medication 1250 MILLIGRAM(S): at 00:45

## 2023-08-04 RX ADMIN — Medication 166.67 MILLIGRAM(S): at 23:44

## 2023-08-04 RX ADMIN — Medication 1: at 08:23

## 2023-08-04 RX ADMIN — Medication 100 MILLIGRAM(S): at 20:10

## 2023-08-04 RX ADMIN — INSULIN GLARGINE 16 UNIT(S): 100 INJECTION, SOLUTION SUBCUTANEOUS at 21:50

## 2023-08-04 RX ADMIN — Medication 40 MILLIGRAM(S): at 05:29

## 2023-08-04 RX ADMIN — CEFEPIME 100 MILLIGRAM(S): 1 INJECTION, POWDER, FOR SOLUTION INTRAMUSCULAR; INTRAVENOUS at 21:49

## 2023-08-04 RX ADMIN — CARVEDILOL PHOSPHATE 3.12 MILLIGRAM(S): 80 CAPSULE, EXTENDED RELEASE ORAL at 16:55

## 2023-08-04 RX ADMIN — Medication 2: at 16:53

## 2023-08-04 RX ADMIN — INSULIN GLARGINE 16 UNIT(S): 100 INJECTION, SOLUTION SUBCUTANEOUS at 01:45

## 2023-08-04 NOTE — PROVIDER CONTACT NOTE (CRITICAL VALUE NOTIFICATION) - TEST AND RESULT REPORTED:
Gram stain collected 8/4 of abcess of Lt foot- No polymorphone nuclear leukocyte seen. Moderate gram pos cocci in cluster.

## 2023-08-04 NOTE — PROGRESS NOTE ADULT - ASSESSMENT
62M presents with left foot wound to subq and BL foot bullae.  - Patient seen and evaluated  - Afebrile, WBC 10.36, ESR 96, CRP 6.1  - Left foot lateral 5th metatarsal base wound to subq with well adhered eschar, no fluctuance, no bogginess, moderate malodor, mild sanguinous drainage, no pus, no purulence, periwound erythema to dorsal lateral midfoot, dorsal proximal 4-5th MTPJ semi-lanced serous filled bulla. Right foot dorsal midfoot lanced bulla, no acute signs of infection.  - Lef Foot X-Ray: Gas foci consistent with wound, 5th metatarsal base possible OM.  - Left Foot Culture: Pending.  - ID recs, appreciated.  - Recommend vascular consult.  - NIDHI/PVR: Pending.   - Left Foot MRI: Pending.  - Pod Plan: Local wound care vs left foot wound debridement with bone biopsy and possible graft application pending [facility] recs and MRI.   - Please document medical clearance for possible podiatric surgery under anesthesia  - Discussed with attending. 62M presents with left foot wound to subq and BL foot bullae.  - Patient seen and evaluated  - Afebrile, WBC 10.36, ESR 96, CRP 6.1  - Left foot lateral 5th metatarsal base wound to subq with well adhered eschar, no fluctuance, no bogginess, moderate malodor, mild sanguinous drainage, no pus, no purulence, periwound erythema to dorsal lateral midfoot, dorsal proximal 4-5th MTPJ semi-lanced serous filled bulla. Right foot dorsal midfoot lanced bulla, no acute signs of infection.  - Lef Foot X-Ray: Gas foci consistent with wound, 5th metatarsal base possible OM.  - Left Foot Culture: Pending.  - ID recs, appreciated.  - Recommend vascular consult.  - NIDHI/PVR: Pending.   - Left Foot MRI: Pending.  - Pod Plan: Local wound care vs left foot wound debridement with bone biopsy and possible graft application pending vasc recs and MRI.   - Documented medical clearance for possible podiatric surgery under anesthesia, appreciated.  - Please document cardiac clearance for possible podiatric surgery under anesthesia.  - Discussed with attending.

## 2023-08-04 NOTE — PROGRESS NOTE ADULT - SUBJECTIVE AND OBJECTIVE BOX
[facility] Division of Hospital Medicine  Yady Briscoe MD  Available via MS Teams    SUBJECTIVE / OVERNIGHT EVENTS:  no acute events overnight   denies any pain at site of wound; denies any f/c/n/v/abd pain     ADDITIONAL REVIEW OF SYSTEMS:  14 point ROS negative except as noted above     MEDICATIONS  (STANDING):  aspirin enteric coated 81 milliGRAM(s) Oral daily  atorvastatin 80 milliGRAM(s) Oral at bedtime  carvedilol 3.125 milliGRAM(s) Oral every 12 hours  cefepime   IVPB 2000 milliGRAM(s) IV Intermittent every 12 hours  dextrose 5%. 1000 milliLiter(s) (50 mL/Hr) IV Continuous <Continuous>  dextrose 5%. 1000 milliLiter(s) (100 mL/Hr) IV Continuous <Continuous>  dextrose 50% Injectable 25 Gram(s) IV Push once  dextrose 50% Injectable 12.5 Gram(s) IV Push once  dextrose 50% Injectable 25 Gram(s) IV Push once  furosemide    Tablet 40 milliGRAM(s) Oral daily  glucagon  Injectable 1 milliGRAM(s) IntraMuscular once  insulin glargine Injectable (LANTUS) 16 Unit(s) SubCutaneous at bedtime  insulin lispro (ADMELOG) corrective regimen sliding scale   SubCutaneous three times a day before meals  insulin lispro (ADMELOG) corrective regimen sliding scale   SubCutaneous at bedtime  lisinopril 20 milliGRAM(s) Oral daily  vancomycin  IVPB 1250 milliGRAM(s) IV Intermittent every 24 hours    MEDICATIONS  (PRN):  acetaminophen     Tablet .. 650 milliGRAM(s) Oral every 6 hours PRN Temp greater or equal to 38C (100.4F), Mild Pain (1 - 3)  dextrose Oral Gel 15 Gram(s) Oral once PRN Blood Glucose LESS THAN 70 milliGRAM(s)/deciliter  melatonin 3 milliGRAM(s) Oral at bedtime PRN Insomnia      I&O's Summary      PHYSICAL EXAM:  Vital Signs Last 24 Hrs  T(C): 37.2 (04 Aug 2023 09:14), Max: 39.1 (03 Aug 2023 17:06)  T(F): 98.9 (04 Aug 2023 09:14), Max: 102.3 (03 Aug 2023 17:06)  HR: 91 (04 Aug 2023 09:14) (70 - 110)  BP: 98/64 (04 Aug 2023 09:14) (98/64 - 124/68)  BP(mean): 72 (04 Aug 2023 00:45) (72 - 77)  RR: 18 (04 Aug 2023 09:14) (18 - 20)  SpO2: 96% (04 Aug 2023 09:14) (96% - 99%)    Parameters below as of 04 Aug 2023 09:14  Patient On (Oxygen Delivery Method): room air      PHYSICAL EXAM:  GENERAL: NAD, well-developed  HEAD:  Atraumatic, normocephalic  EYES: EOMI, conjunctiva and sclera clear  NECK: Supple, no JVD  CHEST/LUNG: Clear to auscultation bilaterally; no wheezing or rales  HEART: Regular rate and rhythm; no murmurs, no LE edema   ABDOMEN: Soft, nontender, nondistended; bowel sounds present  EXTREMITIES:  2+ Peripheral Pulses, no clubbing, cyanosis; L foot with ACE wrap   PSYCH: AAOx3, calm affect, not anxious  SKIN: No rashes or lesions      LABS:                        10.1   9.05  )-----------( 227      ( 04 Aug 2023 07:07 )             30.5     08-04    138  |  100  |  23  ----------------------------<  134<H>  3.7   |  23  |  1.91<H>    Ca    8.8      04 Aug 2023 07:11  Mg     2.1     08-04    TPro  6.8  /  Alb  3.7  /  TBili  0.7  /  DBili  x   /  AST  13  /  ALT  12  /  AlkPhos  72  08-04    PT/INR - ( 03 Aug 2023 21:06 )   PT: 12.4 sec;   INR: 1.13 ratio         PTT - ( 03 Aug 2023 21:06 )  PTT:29.5 sec      Urinalysis Basic - ( 04 Aug 2023 07:11 )    Color: x / Appearance: x / SG: x / pH: x  Gluc: 134 mg/dL / Ketone: x  / Bili: x / Urobili: x   Blood: x / Protein: x / Nitrite: x   Leuk Esterase: x / RBC: x / WBC x   Sq Epi: x / Non Sq Epi: x / Bacteria: x            RADIOLOGY & ADDITIONAL TESTS:  New Imaging Personally Reviewed Today:  New Electrocardiogram Personally Reviewed Today:  Other Results Reviewed Today:   Prior or Outpatient Records Reviewed Today with Summary:    COORDINATION OF CARE:  Consultant Communication and Details of Discussion (where applicable):

## 2023-08-04 NOTE — CONSULT NOTE ADULT - SUBJECTIVE AND OBJECTIVE BOX
DATE OF SERVICE: 08-04-23 @ 18:11    CHIEF COMPLAINT:Patient is a 62y old  Male who presents with a chief complaint of Diabetic foot infection (04 Aug 2023 16:38)      HISTORY OF PRESENT ILLNESS:  62M w/ hx of CAD s/p multiple TOM 2016, IDDM2, CKD, HTN, HLD p/w L sided foot wound and chills. Pt was visiting child and grand child in [place] ~1 week ago when his family noticed L foot lateral wound when he was walking around on a deck bare foot. After coming home, pt denies fevers but does endorse some chills. Denies pain in foot but also endorses general lack of sensation in feet. Went to visit his podiatrist today who then told him to go to the ER.    In ER: Given IV Cefepime, Vancomycin, Clindamycin, LR 1L, Tylenol 1gm IVPB (03 Aug 2023 23:32)      PAST MEDICAL & SURGICAL HISTORY:  Diabetes mellitus      CAD (coronary artery disease)      Stage 3 chronic kidney disease      Essential hypertension              MEDICATIONS:  aspirin enteric coated 81 milliGRAM(s) Oral daily  carvedilol 3.125 milliGRAM(s) Oral every 12 hours  furosemide    Tablet 40 milliGRAM(s) Oral daily  lisinopril 20 milliGRAM(s) Oral daily    cefepime   IVPB 2000 milliGRAM(s) IV Intermittent every 12 hours  metroNIDAZOLE  IVPB 500 milliGRAM(s) IV Intermittent every 12 hours  vancomycin  IVPB 1250 milliGRAM(s) IV Intermittent every 24 hours      acetaminophen     Tablet .. 650 milliGRAM(s) Oral every 6 hours PRN  melatonin 3 milliGRAM(s) Oral at bedtime PRN      atorvastatin 80 milliGRAM(s) Oral at bedtime  dextrose 50% Injectable 25 Gram(s) IV Push once  dextrose 50% Injectable 12.5 Gram(s) IV Push once  dextrose 50% Injectable 25 Gram(s) IV Push once  dextrose Oral Gel 15 Gram(s) Oral once PRN  glucagon  Injectable 1 milliGRAM(s) IntraMuscular once  insulin glargine Injectable (LANTUS) 16 Unit(s) SubCutaneous at bedtime  insulin lispro (ADMELOG) corrective regimen sliding scale   SubCutaneous three times a day before meals  insulin lispro (ADMELOG) corrective regimen sliding scale   SubCutaneous at bedtime    dextrose 5%. 1000 milliLiter(s) IV Continuous <Continuous>  dextrose 5%. 1000 milliLiter(s) IV Continuous <Continuous>      FAMILY HISTORY:      Non-contributory    SOCIAL HISTORY:    [ -] Tobacco  [ -] Drugs  [ -] Alcohol    Allergies    No Known Allergies    Intolerances    	    REVIEW OF SYSTEMS:  CONSTITUTIONAL: No fever  EYES: No eye pain, visual disturbances, or discharge  ENMT:  No difficulty hearing, tinnitus  NECK: No pain or stiffness  RESPIRATORY: No cough, wheezing,  CARDIOVASCULAR: No chest pain, palpitations, passing out, dizziness, or leg swelling  GASTROINTESTINAL:  No nausea, vomiting, diarrhea or constipation. No melena.  GENITOURINARY: No dysuria, hematuria  NEUROLOGICAL: No stroke like symptoms  SKIN: No burning or lesions   ENDOCRINE: No heat or cold intolerance  MUSCULOSKELETAL: No joint pain or swelling  PSYCHIATRIC: No  anxiety, mood swings  HEME/LYMPH: No bleeding gums  ALLERGY AND IMMUNOLOGIC: No hives or eczema	    All other ROS negative    PHYSICAL EXAM:  T(C): 37.9 (08-04-23 @ 16:07), Max: 37.9 (08-04-23 @ 16:07)  HR: 88 (08-04-23 @ 16:07) (70 - 93)  BP: 115/68 (08-04-23 @ 16:07) (98/64 - 124/68)  RR: 18 (08-04-23 @ 16:07) (18 - 20)  SpO2: 96% (08-04-23 @ 16:07) (96% - 99%)  Wt(kg): --  I&O's Summary      Appearance: Normal	  HEENT:   Normal oral mucosa, EOMI	  Cardiovascular:  S1 S2, No JVD,    Respiratory: Lungs clear to auscultation	  Psychiatry: Alert  Gastrointestinal:  Soft, Non-tender, + BS	  Skin: No rashes   Neurologic: Non-focal  Extremities:  No edema  Vascular: Peripheral pulses palpable    	    	  	  CARDIAC MARKERS:  Labs personally reviewed by me                                  10.1   9.05  )-----------( 227      ( 04 Aug 2023 07:07 )             30.5     08-04    138  |  100  |  23  ----------------------------<  134<H>  3.7   |  23  |  1.91<H>    Ca    8.8      04 Aug 2023 07:11  Mg     2.1     08-04    TPro  6.8  /  Alb  3.7  /  TBili  0.7  /  DBili  x   /  AST  13  /  ALT  12  /  AlkPhos  72  08-04          EKG: Personally reviewed by me - NSR  Radiology: Personally reviewed by me -     < from: Xray Chest 1 View AP/PA (08.03.23 @ 21:26) >  IMPRESSION:  Clear lungs.    < end of copied text >  < from: TTE W or WO Ultrasound Enhancing Agent (08.04.23 @ 14:42) >      1. The left ventricular systolic functionis mildly decreased with an ejection fraction of 49 % by Marroquin's method of disks. No evidence of a thrombus in the left ventricle.   2. Multiple segmental abnormalities exist. See findings.   3. Normal right ventricular cavity size, normal right ventricular wall thickness and normal right ventricular systolic function.   4. There is moderate (grade 2) left ventricular diastolic dysfunction.   5. No prior echocardiogram is available for comparison.   6. No significant valvular disease.    < end of copied text >      Assessment /Plan:       62M w/ hx of CAD s/p multiple TOM 2016, IDDM2, CKD, HTN, HLD p/w L sided foot wound and chills. Pt denies fevers but does endorse some chills. Denies pain in foot but also endorses general lack of sensation in feet. Went to visit his podiatrist who then told him to go to the ER. Denies CP, SOB, palpitations, edema, orthopnea or PND.     1. Cardiac Risk Stratification  - Patient with known sHF currently not in decompensated HF.   - No hx of tachy/sonya arrhythmia. ECG non-ischemic NSR.  - No severe MS/AS.   - Abnormal TTE with + WMA. MPI on 2/3/23 with fixed defects consistent with chronic infarct. Patient asymptomatic.  - Patient is mod to elevated risk for low risk pod/vascular surgery. No contraindication to proceed.    2. Coronary Artery Disease  - ECG NSR  - TTE with EF 49% and + WMA  - MPI 2/23 suggestive of infarct  - Patient asymptomatic. Denies CP or SOB.  - c/w medical mgmt: ASA, ticagrelor (agree to lower dose to 60mg PO BID), rosuvastatin     3. Chronic Systolic Heart Failure  - TTE shows EF 49%, moderate DD  - Prior TTE from 1/2023 with EF 35-40%  - Appear well compensated  - C/w sHF GDMT: lasix 40mg PO daily, lisinopril 20mg PO, coreg 3.125mg PO BID    4. HTN  - c/w coreg, lisinopril and lasix as noted above    5. HLD  - c/w rosuvastatin    Differential diagnosis and plan of care discussed with patient after the evaluation. Counseling on diet, nutritional counseling, weight management, exercise and medication compliance was done.   Advanced care planning/advanced directives discussed with patient/family. DNR status including forceful chest compressions to attempt to restart the heart, ventilator support/artificial breathing, electric shock, artificial nutrition, health care proxy, Molst form all discussed with pt. Pt wishes to consider. More than fifteen minutes spent on discussing advanced directives.       Flako Jung DO [facility]  Cardiovascular Medicine  [address] [place] [address] Dr, [address]  Available for call or text via [employer]  Office [phone]   DATE OF SERVICE: 08-04-23 @ 18:11    CHIEF COMPLAINT:Patient is a 62y old  Male who presents with a chief complaint of Diabetic foot infection (04 Aug 2023 16:38)      HISTORY OF PRESENT ILLNESS:  62M w/ hx of CAD s/p multiple TOM 2016, IDDM2, CKD, HTN, HLD p/w L sided foot wound and chills. Pt was visiting child and grand child in [place] ~1 week ago when his family noticed L foot lateral wound when he was walking around on a deck bare foot. After coming home, pt denies fevers but does endorse some chills. Denies pain in foot but also endorses general lack of sensation in feet. Went to visit his podiatrist today who then told him to go to the ER.    In ER: Given IV Cefepime, Vancomycin, Clindamycin, LR 1L, Tylenol 1gm IVPB (03 Aug 2023 23:32)      PAST MEDICAL & SURGICAL HISTORY:  Diabetes mellitus      CAD (coronary artery disease)      Stage 3 chronic kidney disease      Essential hypertension              MEDICATIONS:  aspirin enteric coated 81 milliGRAM(s) Oral daily  carvedilol 3.125 milliGRAM(s) Oral every 12 hours  furosemide    Tablet 40 milliGRAM(s) Oral daily  lisinopril 20 milliGRAM(s) Oral daily    cefepime   IVPB 2000 milliGRAM(s) IV Intermittent every 12 hours  metroNIDAZOLE  IVPB 500 milliGRAM(s) IV Intermittent every 12 hours  vancomycin  IVPB 1250 milliGRAM(s) IV Intermittent every 24 hours      acetaminophen     Tablet .. 650 milliGRAM(s) Oral every 6 hours PRN  melatonin 3 milliGRAM(s) Oral at bedtime PRN      atorvastatin 80 milliGRAM(s) Oral at bedtime  dextrose 50% Injectable 25 Gram(s) IV Push once  dextrose 50% Injectable 12.5 Gram(s) IV Push once  dextrose 50% Injectable 25 Gram(s) IV Push once  dextrose Oral Gel 15 Gram(s) Oral once PRN  glucagon  Injectable 1 milliGRAM(s) IntraMuscular once  insulin glargine Injectable (LANTUS) 16 Unit(s) SubCutaneous at bedtime  insulin lispro (ADMELOG) corrective regimen sliding scale   SubCutaneous three times a day before meals  insulin lispro (ADMELOG) corrective regimen sliding scale   SubCutaneous at bedtime    dextrose 5%. 1000 milliLiter(s) IV Continuous <Continuous>  dextrose 5%. 1000 milliLiter(s) IV Continuous <Continuous>      FAMILY HISTORY:      Non-contributory    SOCIAL HISTORY:    [ -] Tobacco  [ -] Drugs  [ -] Alcohol    Allergies    No Known Allergies    Intolerances    	    REVIEW OF SYSTEMS:  CONSTITUTIONAL: No fever  EYES: No eye pain, visual disturbances, or discharge  ENMT:  No difficulty hearing, tinnitus  NECK: No pain or stiffness  RESPIRATORY: No cough, wheezing,  CARDIOVASCULAR: No chest pain, palpitations, passing out, dizziness, or leg swelling  GASTROINTESTINAL:  No nausea, vomiting, diarrhea or constipation. No melena.  GENITOURINARY: No dysuria, hematuria  NEUROLOGICAL: No stroke like symptoms  SKIN: No burning or lesions   ENDOCRINE: No heat or cold intolerance  MUSCULOSKELETAL: No joint pain or swelling  PSYCHIATRIC: No  anxiety, mood swings  HEME/LYMPH: No bleeding gums  ALLERGY AND IMMUNOLOGIC: No hives or eczema	    All other ROS negative    PHYSICAL EXAM:  T(C): 37.9 (08-04-23 @ 16:07), Max: 37.9 (08-04-23 @ 16:07)  HR: 88 (08-04-23 @ 16:07) (70 - 93)  BP: 115/68 (08-04-23 @ 16:07) (98/64 - 124/68)  RR: 18 (08-04-23 @ 16:07) (18 - 20)  SpO2: 96% (08-04-23 @ 16:07) (96% - 99%)  Wt(kg): --  I&O's Summary      Appearance: Normal	  HEENT:   Normal oral mucosa, EOMI	  Cardiovascular:  S1 S2, No JVD,    Respiratory: Lungs clear to auscultation	  Psychiatry: Alert  Gastrointestinal:  Soft, Non-tender, + BS	  Skin: No rashes   Neurologic: Non-focal  Extremities:  No edema  Vascular: Peripheral pulses palpable    	    	  	  CARDIAC MARKERS:  Labs personally reviewed by me                                  10.1   9.05  )-----------( 227      ( 04 Aug 2023 07:07 )             30.5     08-04    138  |  100  |  23  ----------------------------<  134<H>  3.7   |  23  |  1.91<H>    Ca    8.8      04 Aug 2023 07:11  Mg     2.1     08-04    TPro  6.8  /  Alb  3.7  /  TBili  0.7  /  DBili  x   /  AST  13  /  ALT  12  /  AlkPhos  72  08-04          EKG: Personally reviewed by me - NSR  Radiology: Personally reviewed by me -     < from: Xray Chest 1 View AP/PA (08.03.23 @ 21:26) >  IMPRESSION:  Clear lungs.    < end of copied text >  < from: TTE W or WO Ultrasound Enhancing Agent (08.04.23 @ 14:42) >      1. The left ventricular systolic functionis mildly decreased with an ejection fraction of 49 % by Marroquin's method of disks. No evidence of a thrombus in the left ventricle.   2. Multiple segmental abnormalities exist. See findings.   3. Normal right ventricular cavity size, normal right ventricular wall thickness and normal right ventricular systolic function.   4. There is moderate (grade 2) left ventricular diastolic dysfunction.   5. No prior echocardiogram is available for comparison.   6. No significant valvular disease.    < end of copied text >      Assessment /Plan:       62M w/ hx of CAD s/p multiple TOM 2016, IDDM2, CKD, HTN, HLD p/w L sided foot wound and chills. Pt denies fevers but does endorse some chills. Denies pain in foot but also endorses general lack of sensation in feet. Went to visit his podiatrist who then told him to go to the ER. Denies CP, SOB, palpitations, edema, orthopnea or PND.     1. Cardiac Risk Stratification  - Patient with known sHF currently not in decompensated HF.   - No hx of tachy/sonya arrhythmia. ECG non-ischemic NSR.  - No severe MS/AS.   - Abnormal TTE with + WMA. MPI on 2/3/23 with fixed defects consistent with chronic infarct. Patient asymptomatic.  - Patient is mod to elevated risk for low risk pod/vascular surgery. No contraindication to proceed.    2. Coronary Artery Disease  - ECG NSR  - TTE with EF 49% and + WMA  - MPI 2/23 suggestive of infarct  - Patient asymptomatic. Denies CP or SOB.  - c/w medical mgmt: ASA, ticagrelor (agree to lower dose to 60mg PO BID), rosuvastatin     3. Chronic Systolic Heart Failure  - TTE shows EF 49%, moderate DD  - Prior TTE from 1/2023 with EF 35-40%  - Appear well compensated  - C/w sHF GDMT: lasix 40mg PO daily, lisinopril 20mg PO, coreg 3.125mg PO BID    4. HTN  - c/w coreg, lisinopril and lasix as noted above    5. HLD  - c/w rosuvastatin    For additional questions, will be covered by in house Cardiology.     Differential diagnosis and plan of care discussed with patient after the evaluation. Counseling on diet, nutritional counseling, weight management, exercise and medication compliance was done.   Advanced care planning/advanced directives discussed with patient/family. DNR status including forceful chest compressions to attempt to restart the heart, ventilator support/artificial breathing, electric shock, artificial nutrition, health care proxy, Molst form all discussed with pt. Pt wishes to consider. More than fifteen minutes spent on discussing advanced directives.       Flako Jung DO [facility]  Cardiovascular Medicine  [address] [place] [address] Dr, [address]  Available for call or text via [employer] TE[employer]  Office [phone]   DATE OF SERVICE: 08-04-23 @ 18:11    CHIEF COMPLAINT:Patient is a 62y old  Male who presents with a chief complaint of Diabetic foot infection (04 Aug 2023 16:38)      HISTORY OF PRESENT ILLNESS:  62M w/ hx of CAD s/p multiple TOM 2016, IDDM2, CKD, HTN, HLD p/w L sided foot wound and chills. Pt was visiting child and grand child in [place] ~1 week ago when his family noticed L foot lateral wound when he was walking around on a deck bare foot. After coming home, pt denies fevers but does endorse some chills. Denies pain in foot but also endorses general lack of sensation in feet. Went to visit his podiatrist today who then told him to go to the ER.    In ER: Given IV Cefepime, Vancomycin, Clindamycin, LR 1L, Tylenol 1gm IVPB (03 Aug 2023 23:32)      PAST MEDICAL & SURGICAL HISTORY:  Diabetes mellitus      CAD (coronary artery disease)      Stage 3 chronic kidney disease      Essential hypertension              MEDICATIONS:  aspirin enteric coated 81 milliGRAM(s) Oral daily  carvedilol 3.125 milliGRAM(s) Oral every 12 hours  furosemide    Tablet 40 milliGRAM(s) Oral daily  lisinopril 20 milliGRAM(s) Oral daily    cefepime   IVPB 2000 milliGRAM(s) IV Intermittent every 12 hours  metroNIDAZOLE  IVPB 500 milliGRAM(s) IV Intermittent every 12 hours  vancomycin  IVPB 1250 milliGRAM(s) IV Intermittent every 24 hours      acetaminophen     Tablet .. 650 milliGRAM(s) Oral every 6 hours PRN  melatonin 3 milliGRAM(s) Oral at bedtime PRN      atorvastatin 80 milliGRAM(s) Oral at bedtime  dextrose 50% Injectable 25 Gram(s) IV Push once  dextrose 50% Injectable 12.5 Gram(s) IV Push once  dextrose 50% Injectable 25 Gram(s) IV Push once  dextrose Oral Gel 15 Gram(s) Oral once PRN  glucagon  Injectable 1 milliGRAM(s) IntraMuscular once  insulin glargine Injectable (LANTUS) 16 Unit(s) SubCutaneous at bedtime  insulin lispro (ADMELOG) corrective regimen sliding scale   SubCutaneous three times a day before meals  insulin lispro (ADMELOG) corrective regimen sliding scale   SubCutaneous at bedtime    dextrose 5%. 1000 milliLiter(s) IV Continuous <Continuous>  dextrose 5%. 1000 milliLiter(s) IV Continuous <Continuous>      FAMILY HISTORY:      Non-contributory    SOCIAL HISTORY:    [ -] Tobacco  [ -] Drugs  [ -] Alcohol    Allergies    No Known Allergies    Intolerances    	    REVIEW OF SYSTEMS:  CONSTITUTIONAL: No fever  EYES: No eye pain, visual disturbances, or discharge  ENMT:  No difficulty hearing, tinnitus  NECK: No pain or stiffness  RESPIRATORY: No cough, wheezing,  CARDIOVASCULAR: No chest pain, palpitations, passing out, dizziness, or leg swelling  GASTROINTESTINAL:  No nausea, vomiting, diarrhea or constipation. No melena.  GENITOURINARY: No dysuria, hematuria  NEUROLOGICAL: No stroke like symptoms  SKIN: No burning or lesions   ENDOCRINE: No heat or cold intolerance  MUSCULOSKELETAL: No joint pain or swelling  PSYCHIATRIC: No  anxiety, mood swings  HEME/LYMPH: No bleeding gums  ALLERGY AND IMMUNOLOGIC: No hives or eczema	    All other ROS negative    PHYSICAL EXAM:  T(C): 37.9 (08-04-23 @ 16:07), Max: 37.9 (08-04-23 @ 16:07)  HR: 88 (08-04-23 @ 16:07) (70 - 93)  BP: 115/68 (08-04-23 @ 16:07) (98/64 - 124/68)  RR: 18 (08-04-23 @ 16:07) (18 - 20)  SpO2: 96% (08-04-23 @ 16:07) (96% - 99%)  Wt(kg): --  I&O's Summary      Appearance: Normal	  HEENT:   Normal oral mucosa, EOMI	  Cardiovascular:  S1 S2, No JVD,    Respiratory: Lungs clear to auscultation	  Psychiatry: Alert  Gastrointestinal:  Soft, Non-tender, + BS	  Skin: No rashes   Neurologic: Non-focal  Extremities:  No edema  Vascular: Peripheral pulses palpable    	    	  	  CARDIAC MARKERS:  Labs personally reviewed by me                                  10.1   9.05  )-----------( 227      ( 04 Aug 2023 07:07 )             30.5     08-04    138  |  100  |  23  ----------------------------<  134<H>  3.7   |  23  |  1.91<H>    Ca    8.8      04 Aug 2023 07:11  Mg     2.1     08-04    TPro  6.8  /  Alb  3.7  /  TBili  0.7  /  DBili  x   /  AST  13  /  ALT  12  /  AlkPhos  72  08-04          EKG: Personally reviewed by me - NSR  Radiology: Personally reviewed by me -     < from: Xray Chest 1 View AP/PA (08.03.23 @ 21:26) >  IMPRESSION:  Clear lungs.    < end of copied text >  < from: TTE W or WO Ultrasound Enhancing Agent (08.04.23 @ 14:42) >      1. The left ventricular systolic functionis mildly decreased with an ejection fraction of 49 % by Marroquin's method of disks. No evidence of a thrombus in the left ventricle.   2. Multiple segmental abnormalities exist. See findings.   3. Normal right ventricular cavity size, normal right ventricular wall thickness and normal right ventricular systolic function.   4. There is moderate (grade 2) left ventricular diastolic dysfunction.   5. No prior echocardiogram is available for comparison.   6. No significant valvular disease.    < end of copied text >      Assessment /Plan:       62M w/ hx of CAD s/p multiple TOM 2016, IDDM2, CKD, HTN, HLD p/w L sided foot wound and chills. Pt denies fevers but does endorse some chills. Denies pain in foot but also endorses general lack of sensation in feet. Went to visit his podiatrist who then told him to go to the ER. Denies CP, SOB, palpitations, edema, orthopnea or PND.     1. Cardiac Risk Stratification  - Patient with known sHF currently not in decompensated HF.   - No hx of tachy/sonya arrhythmia. ECG non-ischemic NSR.  - No severe MS/AS.   - Abnormal TTE with + WMA. MPI on 2/3/23 with fixed defects consistent with chronic infarct. Patient asymptomatic.  - Patient is mod to elevated risk for low risk pod/vascular surgery. No contraindication to proceed.    2. Coronary Artery Disease  - ECG NSR  - TTE with EF 49% and + WMA  - NM stress test MPI 2/23 suggestive of infarct and no ischemia  - Patient asymptomatic. Denies CP or SOB.  - c/w medical mgmt: ASA, ticagrelor (agree to lower dose to 60mg PO BID), rosuvastatin     3. Chronic Systolic Heart Failure  - TTE shows EF 49%, moderate DD  - Prior TTE from 1/2023 with EF 35-40%  - Appear well compensated  - C/w sHF GDMT: lasix 40mg PO daily, lisinopril 20mg PO, coreg 3.125mg PO BID    4. HTN  - c/w coreg, lisinopril and lasix as noted above    5. HLD  - c/w rosuvastatin        Pt follows with Dr Bharti Quispe, faculty cards [facility] at [facility]. For additional questions please contact cardiology fellow.   Cardio fellow and attg Dr Valente notified of above          Differential diagnosis and plan of care discussed with patient after the evaluation. Counseling on diet, nutritional counseling, weight management, exercise and medication compliance was done.   Advanced care planning/advanced directives discussed with patient/family. DNR status including forceful chest compressions to attempt to restart the heart, ventilator support/artificial breathing, electric shock, artificial nutrition, health care proxy, Molst form all discussed with pt. Pt wishes to consider. More than fifteen minutes spent on discussing advanced directives.       Flako Jung DO [facility]  Cardiovascular Medicine  [address] [place] [address] Dr, [address]  Available for call or text via [employer]  Office [phone]

## 2023-08-04 NOTE — CONSULT NOTE ADULT - SUBJECTIVE AND OBJECTIVE BOX
Patient is a 62y old  Male who presents with a chief complaint of Diabetic foot infection (04 Aug 2023 12:15)    HPI:  62M w/ hx of CAD s/p multiple TOM 2016, IDDM2, CKD, HTN, HLD admitted 8/3 c/o L sided foot wound and chills.  Patient has DM over 20 years and has neuropathy.  Was visiting [place] (family) and was wearing new shoes.  Developed a blister R top of his foot and wound left foot.  Here he had chills at home.  In the .3.  BC drawn.  Given clindamycin, vancomycin and zosyn.  Noted to have malodor.  XRAY (-).  For MRI.    ID asked to help management.    prior hospital charts reviewed [  ]  primary team notes reviewed [ x ]  other consultant notes reviewed [  ]    PAST MEDICAL & SURGICAL HISTORY:  Diabetes mellitus  CAD (coronary artery disease)  Stage 3 chronic kidney disease  Essential hypertension    Allergies  No Known Allergies    ANTIMICROBIALS:  cefepime   IVPB 2000 every 12 hours (8/3-)  vancomycin  IVPB 1250 every 24 hours (8/3-)    MEDICATIONS  (STANDING):  aspirin enteric coated 81 daily  atorvastatin 80 at bedtime  carvedilol 3.125 every 12 hours  furosemide    Tablet 40 daily  insulin glargine Injectable (LANTUS) 16 at bedtime  insulin lispro (ADMELOG) corrective regimen sliding scale  three times a day before meals  insulin lispro (ADMELOG) corrective regimen sliding scale  at bedtime  lisinopril 20 daily    SOCIAL HISTORY:   not a smoker    FAMILY HISTORY:  diabetes    REVIEW OF SYSTEMS  [  ] ROS unobtainable because:    [  ] All other systems negative except as noted below:	    Constitutional:  [ ] fever [ ] chills  [ ] weight loss  [ ] weakness  Skin:  [ ] rash [ ] phlebitis	  Eyes: [ ] icterus [ ] pain  [ ] discharge	  ENMT: [ ] sore throat  [ ] thrush [ ] ulcers [ ] exudates  Respiratory: [ ] dyspnea [ ] hemoptysis [ ] cough [ ] sputum	  Cardiovascular:  [ ] chest pain [ ] palpitations [ ] edema	  Gastrointestinal:  [ ] nausea [ ] vomiting [ ] diarrhea [ ] constipation [ ] pain	  Genitourinary:  [ ] dysuria [ ] frequency [ ] hematuria [ ] discharge [ ] flank pain  [ ] incontinence  Musculoskeletal:  [ ] myalgias [ ] arthralgias [ ] arthritis  [ ] back pain  Neurological:  [ ] headache [ ] seizures  [ ] confusion/altered mental status  Psychiatric:  [ ] anxiety [ ] depression	  Hematology/Lymphatics:  [ ] lymphadenopathy  Endocrine:  [ ] adrenal [ ] thyroid  Allergic/Immunologic:	 [ ] transplant [ ] seasonal    Vital Signs Last 24 Hrs  T(F): 100.2 (08-04-23 @ 16:07), Max: 102.3 (08-03-23 @ 17:06)  Vital Signs Last 24 Hrs  HR: 88 (08-04-23 @ 16:07) (70 - 110)  BP: 115/68 (08-04-23 @ 16:07) (98/64 - 124/68)  RR: 18 (08-04-23 @ 16:07)  SpO2: 96% (08-04-23 @ 16:07) (96% - 99%)  Wt(kg): --    PHYSICAL EXAM:  Constitutional: non-toxic, no distress  HEAD/EYES: anicteric  ENT:  supple, no thrush  Cardiovascular:   normal S1, S2  Respiratory:  clear BS bilaterally  GI:  soft, non-tender, normal bowel sounds  :  no santos  Musculoskeletal:  no synovitis  Neurologic: awake and alert, normal strength, b/l feet with neuropathy  Skin:  L foot lateral eschar with malodor and bleeding; R foot blister, no infection  Psychiatric:  awake, alert, appropriate mood                       10.1   9.05  )-----------( 227      ( 04 Aug 2023 07:07 )             30.5     138  |  100  |  23  ----------------------------<  134<H>  3.7   |  23  |  1.91<H>    Ca    8.8      04 Aug 2023 07:11  Mg     2.1     08-04    TPro  6.8  /  Alb  3.7  /  TBili  0.7  /  DBili  x   /  AST  13  /  ALT  12  /  AlkPhos  72  08-04    Sedimentation Rate, Erythrocyte: 96 (08-03 @ 21:06)  C-Reactive Protein, Serum: 61 (08-03 @ 21:06)    Urine Microscopic-Add On (NC) (08.03.23 @ 21:19)   Hyaline Casts: 4 /lpf  Bacteria: Negative  Squamous Epithelial Cells: 0 /hpf  Red Blood Cell - Urine: 3 /hpf  White Blood Cell - Urine: 1 /HPF    MICROBIOLOGY:  Culture - Abscess with Gram Stain (collected 04 Aug 2023 07:28)  Source: .Abscess left foot  Gram Stain (04 Aug 2023 16:04):    No polymorphonuclear leukocytes seen per low power field    Moderate Gram Positive Cocci in Clusters seen per oil power field    RADIOLOGY:  imaging below personally reviewed and agree with findings    Xray Foot AP + Lateral, Left (08.03.23 @ 21:26) >  IMPRESSION:  Small ulcer about the base of the left fifth metatarsal with a clustered foci of soft tissue gas, suggestive of infection. No definite radiographic evidence for osteomyelitis at the base of the fifth metatarsal; MRI would provide a more sensitive evaluation.    Xray Chest 1 View AP/PA (08.03.23 @ 21:26) >  IMPRESSION:  Clear lungs.

## 2023-08-04 NOTE — PROGRESS NOTE ADULT - ASSESSMENT
62M w/ hx of CAD s/p multiple TOM 2016, IDDM2, CKD, HTN, HLD p/w L sided diabetic foot wound and sepsis, r/o OM.

## 2023-08-04 NOTE — PATIENT PROFILE ADULT - FALL HARM RISK - RISK INTERVENTIONS
Assistance OOB with selected safe patient handling equipment/Assistance with ambulation/Communicate Fall Risk and Risk Factors to all staff, patient, and family/Discuss with provider need for PT consult/Monitor gait and stability/Reinforce activity limits and safety measures with patient and family/Visual Cue: Yellow wristband/Bed in lowest position, wheels locked, appropriate side rails in place/Call bell, personal items and telephone in reach/Instruct patient to call for assistance before getting out of bed or chair/Non-slip footwear when patient is out of bed/[place] to call system/Physically safe environment - no spills, clutter or unnecessary equipment/Purposeful Proactive Rounding/Room/bathroom lighting operational, light cord in reach

## 2023-08-04 NOTE — PATIENT PROFILE ADULT - FUNCTIONAL ASSESSMENT - BASIC MOBILITY 6.
3-calculated by average/Not able to assess (calculate score using [facility] averaging method)

## 2023-08-04 NOTE — PROGRESS NOTE ADULT - PROBLEM SELECTOR PLAN 2
pt with L foot wound, podiatry evaluation appreciated  - XR showing possile bony erosion, cannot r/o OM  - pending MRI to further evaluate   - pending NIDHI/PVRs - once resulted, will consult vascular   - will consult ID - in interim, c/w vanco and cefepime   - depending on MRI, podiatry may plan for partial ray resection   - will consult cardiology for clearance, will check TTE   - pt with METS>4, RCRI 3 (15% 30d mortality) - pt is intermediate risk for intermediate risk procedure; pending TTE and cardiology clearance prior to OR.

## 2023-08-04 NOTE — PATIENT PROFILE ADULT - FUNCTIONAL ASSESSMENT - DAILY ACTIVITY 5.
POST-OPERATIVE NOTE    Subjective:  Patient is s/p VATS with chest tube placement. Patient sedated and intubated, following commands. Pain is currently well controlled. Recovering appropriately.    Objective:  Physical Exam:  General: NAD, resting comfortably in bed  Pulm: intubated, left sided chest tube x 2. Both with positive air leak. Serosang output  Abdominal: soft, NTND,  Extremities: WWP  : rosas in place draining dark urine      Vital Signs Last 24 Hrs  T(C): 37.5 (15 Isma 2020 23:00), Max: 37.5 (15 Isma 2020 23:00)  T(F): 99.5 (15 Isma 2020 23:00), Max: 99.5 (15 Isma 2020 23:00)  HR: 66 (16 Jan 2020 00:07) (0 - 115)  BP: 115/55 (15 Isma 2020 20:00) (30/12 - 186/92)  BP(mean): 78 (15 Isma 2020 20:00) (17 - 130)  RR: 22 (15 Isma 2020 23:45) (8 - 41)  SpO2: 100% (16 Jan 2020 00:07) (70% - 100%)  I&O's Detail    14 Jan 2020 07:01  -  15 Isma 2020 07:00  --------------------------------------------------------  IN:    fat emulsion (Fish Oil and Plant Based) 20% Infusion: 150 mL    Solution: 150 mL    Solution: 462.5 mL    Solution: 275 mL    TPN (Total Parenteral Nutrition): 1200 mL  Total IN: 2237.5 mL    OUT:    Ileostomy: 925 mL    Voided: 1150 mL  Total OUT: 2075 mL    Total NET: 162.5 mL      15 Isma 2020 07:01  -  16 Jan 2020 00:08  --------------------------------------------------------  IN:    fentaNYL  Infusion: 7 mL    norepinephrine Infusion: 59.8 mL    propofol Infusion: 75.8 mL    sodium chloride 0.9%.: 50 mL    sodium chloride 0.9%.: 675 mL    Solution: 75 mL    Solution: 10 mL    TPN (Total Parenteral Nutrition): 350 mL  Total IN: 1302.6 mL    OUT:    Chest Tube: 95 mL    Chest Tube: 25 mL    Ileostomy: 120 mL    Indwelling Catheter - Urethral: 190 mL    Voided: 500 mL  Total OUT: 930 mL    Total NET: 372.6 mL        piperacillin/tazobactam IVPB.. 3.375  enoxaparin Injectable 40  norepinephrine Infusion 0.5  piperacillin/tazobactam IVPB.. 3.375    PAST MEDICAL & SURGICAL HISTORY:  COPD with hypoxia  ETOHism  ETOH abuse  History of lumbosacral spine surgery  History of prostate surgery  History of appendectomy          LABS:                        9.0    15.44 )-----------( 247      ( 15 Isma 2020 19:48 )             27.7     01-15    143  |  100  |  20  ----------------------------<  151<H>  4.2   |  30  |  0.42<L>    Ca    7.4<L>      15 Isma 2020 19:48  Phos  2.7     01-15  Mg     2.0     01-15    TPro  5.6<L>  /  Alb  2.4<L>  /  TBili  1.0  /  DBili  0.6<H>  /  AST  14  /  ALT  15  /  AlkPhos  63  01-15      CAPILLARY BLOOD GLUCOSE      POCT Blood Glucose.: 108 mg/dL (15 Isma 2020 23:55)  POCT Blood Glucose.: 173 mg/dL (15 Isma 2020 12:19)  POCT Blood Glucose.: 172 mg/dL (15 Isma 2020 10:32)  POCT Blood Glucose.: 189 mg/dL (15 Isma 2020 07:28)      Radiology and Additional Studies:    Assessment:  The patient is a 73y Male who is now several hours post-op from a VATS with chest tube placement.     Plan:  - Pain control as needed  - Wean to extubate tomorrow  - Care per SICU    Red 4 = No assist / stand by assistance

## 2023-08-04 NOTE — PROGRESS NOTE ADULT - SUBJECTIVE AND OBJECTIVE BOX
Patient is a 62y old  Male who presents with a chief complaint of Diabetic foot infection (04 Aug 2023 16:09)       INTERVAL HPI/OVERNIGHT EVENTS:  Patient seen and evaluated at bedside.  Pt is resting comfortable in NAD. Denies N/V/F/C.    Allergies    No Known Allergies    Intolerances        Vital Signs Last 24 Hrs  T(C): 37.9 (04 Aug 2023 16:07), Max: 39.1 (03 Aug 2023 17:06)  T(F): 100.2 (04 Aug 2023 16:07), Max: 102.3 (03 Aug 2023 17:06)  HR: 88 (04 Aug 2023 16:07) (70 - 110)  BP: 115/68 (04 Aug 2023 16:07) (98/64 - 124/68)  BP(mean): 72 (04 Aug 2023 00:45) (72 - 77)  RR: 18 (04 Aug 2023 16:07) (18 - 20)  SpO2: 96% (04 Aug 2023 16:07) (96% - 99%)    Parameters below as of 04 Aug 2023 09:14  Patient On (Oxygen Delivery Method): room air        LABS:                        10.1   9.05  )-----------( 227      ( 04 Aug 2023 07:07 )             30.5     08-04    138  |  100  |  23  ----------------------------<  134<H>  3.7   |  23  |  1.91<H>    Ca    8.8      04 Aug 2023 07:11  Mg     2.1     08-04    TPro  6.8  /  Alb  3.7  /  TBili  0.7  /  DBili  x   /  AST  13  /  ALT  12  /  AlkPhos  72  08-04    PT/INR - ( 03 Aug 2023 21:06 )   PT: 12.4 sec;   INR: 1.13 ratio         PTT - ( 03 Aug 2023 21:06 )  PTT:29.5 sec  Urinalysis Basic - ( 04 Aug 2023 07:11 )    Color: x / Appearance: x / SG: x / pH: x  Gluc: 134 mg/dL / Ketone: x  / Bili: x / Urobili: x   Blood: x / Protein: x / Nitrite: x   Leuk Esterase: x / RBC: x / WBC x   Sq Epi: x / Non Sq Epi: x / Bacteria: x      CAPILLARY BLOOD GLUCOSE      POCT Blood Glucose.: 227 mg/dL (04 Aug 2023 16:29)  POCT Blood Glucose.: 237 mg/dL (04 Aug 2023 12:21)  POCT Blood Glucose.: 156 mg/dL (04 Aug 2023 07:59)  POCT Blood Glucose.: 165 mg/dL (04 Aug 2023 01:27)      Lower Extremity Physical Exam:  Vascular: DP/PT 0/4 B/L, CFT <3sec x 10, Temp gradient_ B/L  Neurology: Epicritic sensation diminished to level of digits, B/L  Musculoskeletal/Ortho: Unremarkable.  Skin: Left foot lateral 5th metatarsal base wound to subq with well adhered eschar, no fluctuance, no bogginess, moderate malodor, mild sanguinous drainage, no pus, no purulence, periwound erythema to dorsal lateral midfoot, dorsal proximal 4-5th MTPJ semi-lanced serous filled bulla. Right foot dorsal midfoot lanced bulla, no acute signs of infection.      RADIOLOGY & ADDITIONAL TESTS:

## 2023-08-04 NOTE — PATIENT PROFILE ADULT - NSPROHMDIABETMGMTSTRAT_GEN_A_NUR
If you have an active [facility] account, please look for your well child questionnaire to come to your [facility] account before your next well child visit.    Well-Child Checkup: 11 to 13 Years     Physical activity is key to lifelong good health. Encourage your child to find activities that he or she enjoys.     Between ages 11 and 13, your child will grow and change a lot. Its important to keep having yearly checkups so the healthcare provider can track this progress. As your child enters puberty, he or she may become more embarrassed about having a checkup. Reassure your child that the exam is normal and necessary. Be aware that the healthcare provider may ask to talk with the child without you in the exam room.  School and social issues  Here are some topics you, your child, and the healthcare provider may want to discuss during this visit:  · School performance. How is your child doing in school? Is homework finished on time? Does your child stay organized? These are skills you can help with. Keep in mind that a drop in school performance can be a sign of other problems.  · Friendships. Do you like your childs friends? Do the friendships seem healthy? Make sure to talk to your child about who his or her friends are and how they spend time together. This is the age when peer pressure can start to be a problem.  · Life at home. How is your childs behavior? Does he or she get along with others in the family? Is he or she respectful of you, other adults, and authority? Does your child participate in family events, or does he or she withdraw from other family members?  · Risky behaviors. Its not too early to start talking to your child about drugs, alcohol, smoking, and sex. Make sure your child understands that these are not activities he or she should do, even if friends are. Answer your childs questions, and dont be afraid to ask questions of your own. Make sure your child knows he or she can always come  to you for help. If youre not sure how to approach these topics, talk to the healthcare provider for advice.  Entering puberty  Puberty is the stage when a child begins to develop sexually into an adult. It usually starts between 9 and 14 for girls, and between 12 and 16 for boys. Here is some of what you can expect when puberty begins:  · Acne and body odor. Hormones that increase during puberty can cause acne (pimples) on the face and body. Hormones can also increase sweating and cause a stronger body odor. At this age, your child should begin to shower or bathe daily. Encourage your child to use deodorant and acne products as needed.  · Body changes in girls. Early in puberty, breasts begin to develop. One breast often starts to grow before the other. This is normal. Hair begins to grow in the pubic area, under the arms, and on the legs. Around 2 years after breasts begin to grow, a girl will start having monthly periods (menstruation). To help prepare your daughter for this change, talk to her about periods, what to expect, and how to use feminine products.  · Body changes in boys. At the start of puberty, the testicles drop lower and the scrotum darkens and becomes looser. Hair begins to grow in the pubic area, under the arms, and on the legs, chest, and face. The voice changes, becoming lower and deeper. As the penis grows and matures, erections and wet dreams begin to happen. Reassure your son that this is normal.  · Emotional changes. Along with these physical changes, youll likely notice changes in your childs personality. You may notice your child developing an interest in dating and becoming more than friends with others. Also, many kids become penn and develop an attitude around puberty. This can be frustrating, but it is very normal. Try to be patient and consistent. Encourage conversations, even when your child doesnt seem to want to talk. No matter how your child acts, he or she still needs a  parent.  Nutrition and exercise tips  Today, kids are less active and eat more junk food than ever before. Your child is starting to make choices about what to eat and how active to be. You cant always have the final say, but you can help your child develop healthy habits. Here are some tips:  · Help your child get at least 30 to 60 minutes of activity every day. The time can be broken up throughout the day. If the weathers bad or youre worried about safety, find supervised indoor activities.   · Limit screen time to 1 hour each day. This includes time spent watching TV, playing video games, using the computer, and texting. If your child has a TV, computer, or video game console in the bedroom, consider replacing it with a music player. For many kids, dancing and singing are fun ways to get moving.  · Limit sugary drinks. Soda, juice, and sports drinks lead to unhealthy weight gain and tooth decay. Water and low-fat or nonfat milk are best to drink. In moderation (no more than 8 to 12 ounces daily), 100% fruit juice is OK. Save soda and other sugary drinks for special occasions.  · Have at least one family meal together each day. Busy schedules often limit time for sitting and talking. Sitting and eating together allows for family time. It also lets you see what and how your child eats.  · Pay attention to portions. Serve portions that make sense for your kids. Let them stop eating when theyre full--dont make them clean their plates. Be aware that many kids appetites increase during puberty. If your child is still hungry after a meal, offer seconds of vegetables or fruit.  · Serve and encourage healthy foods. Your child is making more food decisions on his or her own. All foods have a place in a balanced diet. Fruits, vegetables, lean meats, and whole grains should be eaten every day. Save less healthy foods--like french fries, candy, and chips--for a special occasion. When your child does choose to eat junk  "food, consider making the child buy it with his or her own money. Ask your child to tell you when he or she buys junk food or swaps food with friends.  · Bring your child to the dentist at least twice a year for teeth cleaning and a checkup.  Sleeping tips  At this age, your child needs about 10 hours of sleep each night. Here are some tips:  · Set a bedtime and make sure your child follows it each night.  · TV, computer, and video games can agitate a child and make it hard to calm down for the night. Turn them off the at least an hour before bed. Instead, encourage your child to read before bed.  · If your child has a cell phone, make sure its turned off at night.  · Dont let your child go to sleep very late or sleep in on weekends. This can disrupt sleep patterns and make it harder to sleep on school nights.  · Remind your child to brush and floss his or her teeth before bed. Briefly supervise your child's dental self-care once a week to make sure of proper technique.  Safety tips  Recommendations for keeping your child safe include the following:   · When riding a bike, roller-skating, or using a scooter or skateboard, your child should wear a helmet with the strap fastened. When using roller skates, a scooter, or a skateboard, it is also a good idea for your child to wear wrist guards, elbow pads, and knee pads.  · In the car, all children younger than 13 should sit in the back seat. Children shorter than 4'9" (57 inches) should continue to use a booster seat to properly position the seat belt.  · If your child has a cell phone or portable music player, make sure these are used safely and responsibly. Do not allow your child to talk on the phone, text, or listen to music with headphones while he or she is riding a bike or walking outdoors. Remind your child to pay special attention when crossing the street.  · Constant loud music can cause hearing damage, so monitor the volume on your childs music player. " Many players let you set a limit for how loud the volume can be turned up. Check the directions for details.  · At this age, kids may start taking risks that could be dangerous to their health or well-being. Sometimes bad decisions stem from peer pressure. Other times, kids just dont think ahead about what could happen. Teach your child the importance of making good decisions. Talk about how to recognize peer pressure and come up with strategies for coping with it.  · Sudden changes in your childs mood, behavior, friendships, or activities can be warning signs of problems at school or in other aspects of your childs life. If you notice signs like these, talk to your child and to the staff at your childs school. The healthcare provider may also be able to offer advice.  Vaccines  Based on recommendations from the American Association of Pediatrics, at this visit your child may receive the following vaccines:  · Human papillomavirus (HPV) (ages 11 to 12)  · Influenza (flu), annually  · Meningococcal (ages 11 to 12)  · Tetanus, diphtheria, and pertussis (ages 11 to 12)  Stay on top of social media  In this wired age, kids are much more connected with friends--possibly some theyve never met in person. To teach your child how to use social media responsibly:  · Set limits for the use of cell phones, the computer, and the Internet. Remind your child that you can check the web browser history and cell phone logs to know how these devices are being used. Use parental controls and passwords to block access to inappropriate websites. Use privacy settings on websites so only your childs friends can view his or her profile.  · Explain to your child the dangers of giving out personal information online. Teach your child not to share his or her phone number, address, picture, or other personal details with online friends without your permission.  · Make sure your child understands that things he or she says on the  Internet are never private. Posts made on websites like Facebook, [employer], and Twitter can be seen by people they werent intended for. Posts can easily be misunderstood and can even cause trouble for you or your child. Supervise your childs use of social networks, chat rooms, and email.      Next checkup at: _______________________________     PARENT NOTES:  Date Last Reviewed: 12/1/2016  © 2096-7811 [employer]. [address] [place] [id number]. All rights reserved. This information is not intended as a substitute for professional medical care. Always follow your healthcare professional's instructions.         none

## 2023-08-04 NOTE — CONSULT NOTE ADULT - SUBJECTIVE AND OBJECTIVE BOX
Patient is a 62y old  Male who presents with a chief complaint of Diabetic foot infection (03 Aug 2023 23:32)      HPI:  62M w/ hx of CAD s/p multiple TOM 2016, IDDM2, CKD, HTN, HLD p/w L sided foot wound and chills. Pt was visiting child and grand child in [place] ~1 week ago when his family noticed L foot lateral wound when he was walking around on a deck bare foot. After coming home, pt denies fevers but does endorse some chills. Denies pain in foot but also endorses general lack of sensation in feet. Went to visit his podiatrist today who then told him to go to the ER.    In ER: Given IV Cefepime, Vancomycin, Clindamycin, LR 1L, Tylenol 1gm IVPB (03 Aug 2023 23:32)      PAST MEDICAL & SURGICAL HISTORY:  Diabetes mellitus      CAD (coronary artery disease)      Stage 3 chronic kidney disease      Essential hypertension          MEDICATIONS  (STANDING):  aspirin enteric coated 81 milliGRAM(s) Oral daily  atorvastatin 80 milliGRAM(s) Oral at bedtime  carvedilol 3.125 milliGRAM(s) Oral every 12 hours  cefepime   IVPB 2000 milliGRAM(s) IV Intermittent every 12 hours  dextrose 5%. 1000 milliLiter(s) (50 mL/Hr) IV Continuous <Continuous>  dextrose 5%. 1000 milliLiter(s) (100 mL/Hr) IV Continuous <Continuous>  dextrose 50% Injectable 25 Gram(s) IV Push once  dextrose 50% Injectable 12.5 Gram(s) IV Push once  dextrose 50% Injectable 25 Gram(s) IV Push once  furosemide    Tablet 40 milliGRAM(s) Oral daily  glucagon  Injectable 1 milliGRAM(s) IntraMuscular once  insulin glargine Injectable (LANTUS) 16 Unit(s) SubCutaneous at bedtime  insulin lispro (ADMELOG) corrective regimen sliding scale   SubCutaneous three times a day before meals  insulin lispro (ADMELOG) corrective regimen sliding scale   SubCutaneous at bedtime  lisinopril 20 milliGRAM(s) Oral daily  vancomycin  IVPB 1250 milliGRAM(s) IV Intermittent every 24 hours    MEDICATIONS  (PRN):  acetaminophen     Tablet .. 650 milliGRAM(s) Oral every 6 hours PRN Temp greater or equal to 38C (100.4F), Mild Pain (1 - 3)  dextrose Oral Gel 15 Gram(s) Oral once PRN Blood Glucose LESS THAN 70 milliGRAM(s)/deciliter  melatonin 3 milliGRAM(s) Oral at bedtime PRN Insomnia      Allergies    No Known Allergies    Intolerances    VITALS:    Vital Signs Last 24 Hrs  T(C): 37.2 (04 Aug 2023 01:07), Max: 39.1 (03 Aug 2023 17:06)  T(F): 98.9 (04 Aug 2023 01:07), Max: 102.3 (03 Aug 2023 17:06)  HR: 93 (04 Aug 2023 01:07) (80 - 110)  BP: 124/68 (04 Aug 2023 01:07) (101/60 - 124/68)  BP(mean): 72 (04 Aug 2023 00:45) (72 - 77)  RR: 18 (04 Aug 2023 01:07) (18 - 20)  SpO2: 97% (04 Aug 2023 01:07) (97% - 99%)    Parameters below as of 04 Aug 2023 01:07  Patient On (Oxygen Delivery Method): room air        LABS:                          11.4   10.36 )-----------( 274      ( 03 Aug 2023 21:06 )             34.6       08-03    137  |  103  |  24<H>  ----------------------------<  129<H>  4.1   |  20<L>  |  1.91<H>    Ca    9.5      03 Aug 2023 21:06    TPro  7.7  /  Alb  4.1  /  TBili  0.6  /  DBili  x   /  AST  20  /  ALT  16  /  AlkPhos  81  08-03      CAPILLARY BLOOD GLUCOSE      POCT Blood Glucose.: 165 mg/dL (04 Aug 2023 01:27)      PT/INR - ( 03 Aug 2023 21:06 )   PT: 12.4 sec;   INR: 1.13 ratio         PTT - ( 03 Aug 2023 21:06 )  PTT:29.5 sec    LOWER EXTREMITY PHYSICAL EXAM:    Vascular: DP/PT 0/4, B/L, CFT <3seconds B/L, Temperature gradient warm to cool, B/L.   Neuro: Epicritic sensation diminished to the level of digits, B/L.  Musculoskeletal/Ortho: unremarkable  Skin: Left foot lateral 5th metatarsal base wound with well adhered eschar, periwound hyperkeratosis, no fluctuance, no bogginess, moderate malodor, no drainage, no pus, no purulence, periwound erythema to dorsal lateral midfoot, dorsal proximal 4-5th MTPJ semi-lanced serous filled blister, 1cc of serous fluid expressed. Right foot dorsal midfoot lanced blister, no acute signs of infection.      RADIOLOGY & ADDITIONAL STUDIES:    < from: Xray Foot AP + Lateral, Left (08.03.23 @ 21:26) >  ROCEDURE DATE:  08/03/2023    ******PRELIMINARY REPORT******      ******PRELIMINARY REPORT******           INTERPRETATION:  XR FOOT 2 VIEWS LEFT    CLINICAL INDICATION: foot ulcer, fever    TECHNIQUE: 2 views of the left foot.    COMPARISON: No similar prior comparisons available.    FINDINGS:  No acute fracture or dislocation. Subtle cortical irregularity at the   base of the left foot, question of erosion. Calcaneal enthesophytes. Mild   midfoot arthrosis.  Small ulcer about the base of the left fifth metatarsal with a few foci   of gas.    IMPRESSION:  Small ulcer about the base of the left fifth metatarsal with a few foci   of gas, suggestive of underlying infection. Subtle cortical irregularity   in this region, question of erosion.      < end of copied text >

## 2023-08-05 DIAGNOSIS — M86.179 OTHER ACUTE OSTEOMYELITIS, UNSPECIFIED ANKLE AND FOOT: ICD-10-CM

## 2023-08-05 LAB
A1C WITH ESTIMATED AVERAGE GLUCOSE RESULT: 9.7 % — HIGH (ref 4–5.6)
ANION GAP SERPL CALC-SCNC: 13 MMOL/L — SIGNIFICANT CHANGE UP (ref 5–17)
BUN SERPL-MCNC: 22 MG/DL — SIGNIFICANT CHANGE UP (ref 7–23)
CALCIUM SERPL-MCNC: 8.7 MG/DL — SIGNIFICANT CHANGE UP (ref 8.4–10.5)
CHLORIDE SERPL-SCNC: 99 MMOL/L — SIGNIFICANT CHANGE UP (ref 96–108)
CO2 SERPL-SCNC: 24 MMOL/L — SIGNIFICANT CHANGE UP (ref 22–31)
CREAT SERPL-MCNC: 1.93 MG/DL — HIGH (ref 0.5–1.3)
CULTURE RESULTS: SIGNIFICANT CHANGE UP
EGFR: 38 ML/MIN/1.73M2 — LOW
ESTIMATED AVERAGE GLUCOSE: 232 MG/DL — HIGH (ref 68–114)
GLUCOSE BLDC GLUCOMTR-MCNC: 215 MG/DL — HIGH (ref 70–99)
GLUCOSE BLDC GLUCOMTR-MCNC: 217 MG/DL — HIGH (ref 70–99)
GLUCOSE BLDC GLUCOMTR-MCNC: 250 MG/DL — HIGH (ref 70–99)
GLUCOSE BLDC GLUCOMTR-MCNC: 278 MG/DL — HIGH (ref 70–99)
GLUCOSE SERPL-MCNC: 248 MG/DL — HIGH (ref 70–99)
HCT VFR BLD CALC: 31.2 % — LOW (ref 39–50)
HGB BLD-MCNC: 10.3 G/DL — LOW (ref 13–17)
MCHC RBC-ENTMCNC: 28.2 PG — SIGNIFICANT CHANGE UP (ref 27–34)
MCHC RBC-ENTMCNC: 33 GM/DL — SIGNIFICANT CHANGE UP (ref 32–36)
MCV RBC AUTO: 85.5 FL — SIGNIFICANT CHANGE UP (ref 80–100)
NRBC # BLD: 0 /100 WBCS — SIGNIFICANT CHANGE UP (ref 0–0)
PLATELET # BLD AUTO: 267 K/UL — SIGNIFICANT CHANGE UP (ref 150–400)
POTASSIUM SERPL-MCNC: 3.9 MMOL/L — SIGNIFICANT CHANGE UP (ref 3.5–5.3)
POTASSIUM SERPL-SCNC: 3.9 MMOL/L — SIGNIFICANT CHANGE UP (ref 3.5–5.3)
RBC # BLD: 3.65 M/UL — LOW (ref 4.2–5.8)
RBC # FLD: 12.7 % — SIGNIFICANT CHANGE UP (ref 10.3–14.5)
SODIUM SERPL-SCNC: 136 MMOL/L — SIGNIFICANT CHANGE UP (ref 135–145)
SPECIMEN SOURCE: SIGNIFICANT CHANGE UP
VANCOMYCIN TROUGH SERPL-MCNC: 11.5 UG/ML — SIGNIFICANT CHANGE UP (ref 10–20)
WBC # BLD: 9.18 K/UL — SIGNIFICANT CHANGE UP (ref 3.8–10.5)
WBC # FLD AUTO: 9.18 K/UL — SIGNIFICANT CHANGE UP (ref 3.8–10.5)

## 2023-08-05 PROCEDURE — 99232 SBSQ HOSP IP/OBS MODERATE 35: CPT

## 2023-08-05 PROCEDURE — 99253 IP/OBS CNSLTJ NEW/EST LOW 45: CPT

## 2023-08-05 RX ORDER — INSULIN LISPRO 100/ML
2 VIAL (ML) SUBCUTANEOUS
Refills: 0 | Status: DISCONTINUED | OUTPATIENT
Start: 2023-08-05 | End: 2023-08-13

## 2023-08-05 RX ORDER — INSULIN GLARGINE 100 [IU]/ML
20 INJECTION, SOLUTION SUBCUTANEOUS AT BEDTIME
Refills: 0 | Status: DISCONTINUED | OUTPATIENT
Start: 2023-08-05 | End: 2023-08-07

## 2023-08-05 RX ADMIN — INSULIN GLARGINE 20 UNIT(S): 100 INJECTION, SOLUTION SUBCUTANEOUS at 21:27

## 2023-08-05 RX ADMIN — Medication 81 MILLIGRAM(S): at 12:12

## 2023-08-05 RX ADMIN — Medication 2: at 08:35

## 2023-08-05 RX ADMIN — ATORVASTATIN CALCIUM 80 MILLIGRAM(S): 80 TABLET, FILM COATED ORAL at 21:18

## 2023-08-05 RX ADMIN — Medication 2: at 17:12

## 2023-08-05 RX ADMIN — Medication 650 MILLIGRAM(S): at 21:17

## 2023-08-05 RX ADMIN — CEFEPIME 100 MILLIGRAM(S): 1 INJECTION, POWDER, FOR SOLUTION INTRAMUSCULAR; INTRAVENOUS at 21:18

## 2023-08-05 RX ADMIN — TICAGRELOR 60 MILLIGRAM(S): 90 TABLET ORAL at 17:12

## 2023-08-05 RX ADMIN — Medication 100 MILLIGRAM(S): at 17:13

## 2023-08-05 RX ADMIN — Medication 40 MILLIGRAM(S): at 05:32

## 2023-08-05 RX ADMIN — CARVEDILOL PHOSPHATE 3.12 MILLIGRAM(S): 80 CAPSULE, EXTENDED RELEASE ORAL at 17:13

## 2023-08-05 RX ADMIN — Medication 3: at 12:12

## 2023-08-05 RX ADMIN — Medication 2 UNIT(S): at 17:12

## 2023-08-05 RX ADMIN — TICAGRELOR 60 MILLIGRAM(S): 90 TABLET ORAL at 05:32

## 2023-08-05 RX ADMIN — Medication 100 MILLIGRAM(S): at 05:32

## 2023-08-05 RX ADMIN — Medication 166.67 MILLIGRAM(S): at 22:08

## 2023-08-05 RX ADMIN — CEFEPIME 100 MILLIGRAM(S): 1 INJECTION, POWDER, FOR SOLUTION INTRAMUSCULAR; INTRAVENOUS at 08:35

## 2023-08-05 RX ADMIN — Medication 650 MILLIGRAM(S): at 22:10

## 2023-08-05 NOTE — PROGRESS NOTE ADULT - SUBJECTIVE AND OBJECTIVE BOX
INFECTIOUS DISEASES FOLLOW UP--Stephen Anderson MD  Pager [phone]    This is a follow up note for this  62y Male with  Cellulitis  He feels like the foot is improving.  Less pain/swelling.  No other complaints.    Further ROS:  CONSTITUTIONAL:  No fever, good appetite  CARDIOVASCULAR:  No chest pain or palpitations  RESPIRATORY:  No dyspnea    Allergies  No Known Allergies    ANTIBIOTICS/RELEVANT:  antimicrobials  cefepime   IVPB 2000 milliGRAM(s) IV Intermittent every 12 hours  metroNIDAZOLE  IVPB 500 milliGRAM(s) IV Intermittent every 12 hours  vancomycin  IVPB 1250 milliGRAM(s) IV Intermittent every 24 hours    OTHER:  acetaminophen     Tablet .. 650 milliGRAM(s) Oral every 6 hours PRN  aspirin enteric coated 81 milliGRAM(s) Oral daily  atorvastatin 80 milliGRAM(s) Oral at bedtime  carvedilol 3.125 milliGRAM(s) Oral every 12 hours  dextrose 5%. 1000 milliLiter(s) IV Continuous <Continuous>  dextrose 5%. 1000 milliLiter(s) IV Continuous <Continuous>  dextrose 50% Injectable 25 Gram(s) IV Push once  dextrose 50% Injectable 12.5 Gram(s) IV Push once  dextrose 50% Injectable 25 Gram(s) IV Push once  dextrose Oral Gel 15 Gram(s) Oral once PRN  glucagon  Injectable 1 milliGRAM(s) IntraMuscular once  insulin glargine Injectable (LANTUS) 16 Unit(s) SubCutaneous at bedtime  insulin lispro (ADMELOG) corrective regimen sliding scale   SubCutaneous three times a day before meals  insulin lispro (ADMELOG) corrective regimen sliding scale   SubCutaneous at bedtime  lisinopril 20 milliGRAM(s) Oral daily  melatonin 3 milliGRAM(s) Oral at bedtime PRN  ticagrelor 60 milliGRAM(s) Oral every 12 hours    Objective:  Vital Signs Last 24 Hrs  T(C): 36.7 (05 Aug 2023 05:07), Max: 37.9 (04 Aug 2023 16:07)  T(F): 98.1 (05 Aug 2023 05:07), Max: 100.2 (04 Aug 2023 16:07)  HR: 83 (05 Aug 2023 05:10) (82 - 91)  BP: 89/51 (05 Aug 2023 05:10) (89/51 - 115/68)  BP(mean): --  RR: 18 (05 Aug 2023 05:07) (18 - 18)  SpO2: 95% (05 Aug 2023 05:07) (95% - 96%)    Parameters below as of 05 Aug 2023 05:07  Patient On (Oxygen Delivery Method): room air      PHYSICAL EXAM:  Constitutional:no acute distress  Respiratory: clear BL  Cardiovascular: S1S2  Gastrointestinal:soft, (+) BS, no tenderness  Extremities:no e/e/c--L foot mildly malodorous.....ulcer at plantar james....no cellulitis  No Lymphadenopathy  IV sites not inflammed.    LABS:                        10.3   9.18  )-----------( 267      ( 05 Aug 2023 06:38 )             31.2     08-05    136  |  99  |  22  ----------------------------<  248<H>  3.9   |  24  |  1.93<H>    Ca    8.7      05 Aug 2023 06:38  Mg     2.1     08-04    TPro  6.8  /  Alb  3.7  /  TBili  0.7  /  DBili  x   /  AST  13  /  ALT  12  /  AlkPhos  72  08-04  PT/INR - ( 03 Aug 2023 21:06 )   PT: 12.4 sec;   INR: 1.13 ratio    PTT - ( 03 Aug 2023 21:06 )  PTT:29.5 sec  Urinalysis Basic - ( 05 Aug 2023 06:38 )    Color: x / Appearance: x / SG: x / pH: x  Gluc: 248 mg/dL / Ketone: x  / Bili: x / Urobili: x   Blood: x / Protein: x / Nitrite: x   Leuk Esterase: x / RBC: x / WBC x   Sq Epi: x / Non Sq Epi: x / Bacteria: x    MICROBIOLOGY: pending    Imp/rx:  62 M, dm, ckd, vascular disease.    diabetic foot infection that is improving--on broad abx.  can continue vanco, cefepime and flagyl.  If no MRSA growth by tomorrow, would stop the vancomycin please.

## 2023-08-05 NOTE — CONSULT NOTE ADULT - ASSESSMENT
62M w/ hx of CAD s/p multiple TOM 2016, IDDM2, CKD, HTN, HLD admitted 8/3 c/o L sided foot wound and chills.  Patient has DM over 20 years and has neuropathy.  Was visiting [place] (family) and was wearing new shoes.  Developed a blister R top of his foot and wound left foot.  Here he had chills at home.  In the .3.  BC drawn.  Given clindamycin, vancomycin and zosyn.  Noted to have malodor.  XRAY (-).  For MRI.    left foot infection with eschar and malodorous  - would cover for anaerobes too  - add metronidazole 500mg bid  - f/u cultures  - await MRI    Please call the ID service [phone] with questions or concerns over the weekend
ASSESSMENT: 62M w/ hx of CAD s/p multiple TOM 2016, IDDM2, CKD, HTN, HLD p/w L sided foot wound and chills. MRI w/ findings of OM within lateral aspect of base of 5th metatarsal. Podiatry planning possible intervention, vascular surgery consulted. ABIs done, L 1.07, R 0.75.    PLAN:   - No acute vascular surgical intervention at this time  - Continue ASA, statin  - Continue abx per ID recs  - F/u podiatry plan  - Care per primary team      Patient discussed with vascular radha fellow, Dr. Lawrence, on behalf of Dr. Carpio.      Melissa Fang, PGY-2  Vascular Surgery  [phone]
62M presents with left foot wound  - Patient seen and evaluated  - Afebrile, WBC 10.36, ESR 96, CRP 6.1  - Left foot lateral 5th metatarsal base wound with well adhered eschar, periwound hyperkeratosis, no fluctuance, no bogginess, moderate malodor, no drainage, no pus, no purulence, periwound erythema to dorsal lateral midfoot, dorsal proximal 4-5th MTPJ semi-lanced serous filled blister, 1cc of serous fluid expressed. Right foot dorsal midfoot lanced blister, no acute signs of infection.  - Left foot Xray: fifth metatarsal few foci of gas, subtle cortical irregularity in this region, question of erosion.  - Left foot cultured: pending  - Recommend ID consult  - Recommend vascular consult  - Recommend admit to medicine  - Recommend IV vancomycin, cefepime  - Ordered NIDHI/ PVR  - Ordered left foot MR  - Please document medical clearance for possible podiatric surgical intervention during this admission  - Pod plan local wound care vs partial 5th ray resection pending vascular recs/ MRI  - Discussed with attending

## 2023-08-05 NOTE — PROGRESS NOTE ADULT - ASSESSMENT
62M w/ hx of CAD s/p multiple TOM 2016, IDDM2, CKD, HTN, HLD p/w L sided diabetic foot wound and sepsis, with MRI showing OM.

## 2023-08-05 NOTE — PROGRESS NOTE ADULT - SUBJECTIVE AND OBJECTIVE BOX
[facility] Division of Hospital Medicine  Yady Briscoe MD  Available via MS Teams    SUBJECTIVE / OVERNIGHT EVENTS:  no acute events overnight   pt feeling well, denies any pain at site of wound on L foot  denies any fevers, chills, nausea/vomiting, no abd pain     ADDITIONAL REVIEW OF SYSTEMS:  14 point ROS negative except as noted above     MEDICATIONS  (STANDING):  aspirin enteric coated 81 milliGRAM(s) Oral daily  atorvastatin 80 milliGRAM(s) Oral at bedtime  carvedilol 3.125 milliGRAM(s) Oral every 12 hours  cefepime   IVPB 2000 milliGRAM(s) IV Intermittent every 12 hours  dextrose 5%. 1000 milliLiter(s) (50 mL/Hr) IV Continuous <Continuous>  dextrose 5%. 1000 milliLiter(s) (100 mL/Hr) IV Continuous <Continuous>  dextrose 50% Injectable 25 Gram(s) IV Push once  dextrose 50% Injectable 12.5 Gram(s) IV Push once  dextrose 50% Injectable 25 Gram(s) IV Push once  glucagon  Injectable 1 milliGRAM(s) IntraMuscular once  insulin glargine Injectable (LANTUS) 16 Unit(s) SubCutaneous at bedtime  insulin lispro (ADMELOG) corrective regimen sliding scale   SubCutaneous three times a day before meals  insulin lispro (ADMELOG) corrective regimen sliding scale   SubCutaneous at bedtime  lisinopril 20 milliGRAM(s) Oral daily  metroNIDAZOLE  IVPB 500 milliGRAM(s) IV Intermittent every 12 hours  ticagrelor 60 milliGRAM(s) Oral every 12 hours  vancomycin  IVPB 1250 milliGRAM(s) IV Intermittent every 24 hours    MEDICATIONS  (PRN):  acetaminophen     Tablet .. 650 milliGRAM(s) Oral every 6 hours PRN Temp greater or equal to 38C (100.4F), Mild Pain (1 - 3)  dextrose Oral Gel 15 Gram(s) Oral once PRN Blood Glucose LESS THAN 70 milliGRAM(s)/deciliter  melatonin 3 milliGRAM(s) Oral at bedtime PRN Insomnia      I&O's Summary      PHYSICAL EXAM:  Vital Signs Last 24 Hrs  T(C): 37 (05 Aug 2023 08:52), Max: 37.9 (04 Aug 2023 16:07)  T(F): 98.6 (05 Aug 2023 08:52), Max: 100.2 (04 Aug 2023 16:07)  HR: 66 (05 Aug 2023 08:52) (66 - 88)  BP: 121/65 (05 Aug 2023 11:07) (89/51 - 121/65)  BP(mean): --  RR: 18 (05 Aug 2023 08:52) (18 - 18)  SpO2: 98% (05 Aug 2023 11:07) (95% - 98%)    Parameters below as of 05 Aug 2023 11:07  Patient On (Oxygen Delivery Method): room air      PHYSICAL EXAM:  GENERAL: NAD, well-developed  HEAD:  Atraumatic, normocephalic  EYES: EOMI, conjunctiva and sclera clear  NECK: Supple, no JVD  CHEST/LUNG: Clear to auscultation bilaterally; no wheezing or rales  HEART: Regular rate and rhythm; no murmurs, trace LLE edema   ABDOMEN: Soft, nontender, nondistended; bowel sounds present  EXTREMITIES:  2+ Peripheral Pulses, no clubbing, cyanosis; b/l feet wrapped, LLE with some erythema, warmth and swelling   PSYCH: AAOx3, calm affect, not anxious  SKIN: LLE with warmth and mild erythema, +skin wounds wrapped        LABS:                        10.3   9.18  )-----------( 267      ( 05 Aug 2023 06:38 )             31.2     08-05    136  |  99  |  22  ----------------------------<  248<H>  3.9   |  24  |  1.93<H>    Ca    8.7      05 Aug 2023 06:38  Mg     2.1     08-04    TPro  6.8  /  Alb  3.7  /  TBili  0.7  /  DBili  x   /  AST  13  /  ALT  12  /  AlkPhos  72  08-04    PT/INR - ( 03 Aug 2023 21:06 )   PT: 12.4 sec;   INR: 1.13 ratio         PTT - ( 03 Aug 2023 21:06 )  PTT:29.5 sec      Urinalysis Basic - ( 05 Aug 2023 06:38 )    Color: x / Appearance: x / SG: x / pH: x  Gluc: 248 mg/dL / Ketone: x  / Bili: x / Urobili: x   Blood: x / Protein: x / Nitrite: x   Leuk Esterase: x / RBC: x / WBC x   Sq Epi: x / Non Sq Epi: x / Bacteria: x        Culture - Abscess with Gram Stain (collected 04 Aug 2023 07:28)  Source: .Abscess left foot  Gram Stain (04 Aug 2023 16:04):    No polymorphonuclear leukocytes seen per low power field    Moderate Gram Positive Cocci in Clusters seen per oil power field  Preliminary Report (05 Aug 2023 09:11):    Few Escherichia coli    Culture - Urine (collected 03 Aug 2023 21:19)  Source: Clean Catch Clean Catch (Midstream)  Final Report (05 Aug 2023 06:44):    <10,000 CFU/mL Normal Urogenital Radha    Culture - Blood (collected 03 Aug 2023 21:02)  Source: .Blood Blood-Peripheral  Preliminary Report (05 Aug 2023 01:03):    No growth at 24 hours    Culture - Blood (collected 03 Aug 2023 20:45)  Source: .Blood Blood-Peripheral  Preliminary Report (05 Aug 2023 01:03):    No growth at 24 hours          RADIOLOGY & ADDITIONAL TESTS:  New Imaging Personally Reviewed Today:  New Electrocardiogram Personally Reviewed Today:  Other Results Reviewed Today:   Prior or Outpatient Records Reviewed Today with Summary:    COORDINATION OF CARE:  Consultant Communication and Details of Discussion (where applicable):

## 2023-08-05 NOTE — PROGRESS NOTE ADULT - PROBLEM SELECTOR PLAN 2
pt with L foot wound with MRI showing L 5th base OM   - c/w vanco, cefepime, flagyl for now - ID following   - Wound cx with some E coli, Bcx NGTD  - NIDHI/PVRs reviewed, will consult vascular today  - will f/u podiatry regarding possible partial ray resection   - pt with METS>4, RCRI 3 (15% 30d mortality) - pt is intermediate risk for low risk procedure, no contraindication to proceed to OR for partial ray resection  - cardiology pre-op clearance appreciated

## 2023-08-05 NOTE — PROGRESS NOTE ADULT - ASSESSMENT
62M presents with left foot wound to subq and BL foot bullae.  - Patient seen and evaluated  - Afebrile, WBC 10.36, ESR 96, CRP 6.1  - Left foot lateral 5th metatarsal base wound to subq with well adhered eschar, no fluctuance, no bogginess, moderate malodor, mild sanguinous drainage, no pus, no purulence, periwound erythema to dorsal lateral midfoot, dorsal proximal 4-5th MTPJ semi-lanced serous filled bulla. Right foot dorsal midfoot lanced bulla, no acute signs of infection.  - Lef Foot X-Ray: Gas foci consistent with wound, 5th metatarsal base possible OM.  - Left Foot Culture: E coli (prelim)   - ID recs, appreciated.  - Recommend vascular consult.  - NIDHI/PVR: RABI 1.07, RTBI 0.75, LABI 0.75, LTBI 0.66   - Left Foot MRI: Pending.  - Pod Plan: Local wound care vs left foot wound debridement with bone biopsy and possible graft application pending vasc recs and MRI.   - Documented medical clearance for possible podiatric surgery under anesthesia, appreciated.  - Please document cardiac clearance for possible podiatric surgery under anesthesia.  - Discussed with attending.   62M presents with left foot wound to subq and BL foot bullae.  - Patient seen and evaluated  - Afebrile, WBC 10.36, ESR 96, CRP 6.1  - Left foot lateral 5th metatarsal base wound to subq with well adhered eschar, no fluctuance, no bogginess, moderate malodor, mild sanguinous drainage, no pus, no purulence, periwound erythema to dorsal lateral midfoot, dorsal proximal 4-5th MTPJ semi-lanced serous filled bulla. Right foot dorsal midfoot lanced bulla, no acute signs of infection.  - Lef Foot X-Ray: Gas foci consistent with wound, 5th metatarsal base possible OM.  - Left Foot Culture: E coli (prelim)   - ID recs, appreciated.  - Recommend vascular consult.  - NIDHI/PVR: RABI 1.07, RTBI 0.75, LABI 0.75, LTBI 0.66   - Left Foot MRI: Osteomyelitis within the lateral aspect of the base of the fifth metatarsal   - Pod Plan: Local wound care vs left foot wound debridement with bone biopsy and possible graft application pending vasc recs and MRI.   - Documented medical clearance for possible podiatric surgery under anesthesia, appreciated.  - Documented cardiac clearance for possible podiatric surgery under anesthesia, appreciated   - Discussed with attending.

## 2023-08-05 NOTE — CONSULT NOTE ADULT - SUBJECTIVE AND OBJECTIVE BOX
VASCULAR SURGERY CONSULT NOTE  --------------------------------------------------------------------------------------------  HPI:  62M w/ hx of CAD s/p multiple TOM 2016, IDDM2, CKD, HTN, HLD p/w L sided foot wound and chills. Pt was visiting child and grandchild in [place] ~1 week ago when his family noticed L foot lateral wound when he was walking around on a deck bare foot. After coming home, pt denies fevers but does endorse some chills. Denies pain in foot but also endorses general lack of sensation in feet. Went to visit his podiatrist today who then told him to go to the ER.    MRI w/ findings of 5th metatarsal lateral base osteomyelitis. Podiatry following with plans for possible debridement with bone bx and possible graft. Requested vascular consult. NIDHI/PVRs done, L 1.07, R 0.75.     Patient reports reduced sensation of L foot. Denies pain, fevers, chills, nausea, vomiting. Has never seen a vascular surgeon or had any peripheral vascular interventions. Of note, patient reports that the bullae on b/l feet dorsum are due to new crocs.      ROS: 10-system review is otherwise negative except HPI above.      PAST MEDICAL & SURGICAL HISTORY:  Diabetes mellitus      CAD (coronary artery disease)      Stage 3 chronic kidney disease      Essential hypertension        FAMILY HISTORY:    [] Family history not pertinent as reviewed with the patient and family    SOCIAL HISTORY:    ALLERGIES: No Known Allergies      HOME MEDICATIONS:    CURRENT MEDICATIONS  MEDICATIONS (STANDING): aspirin enteric coated 81 milliGRAM(s) Oral daily  atorvastatin 80 milliGRAM(s) Oral at bedtime  carvedilol 3.125 milliGRAM(s) Oral every 12 hours  cefepime   IVPB 2000 milliGRAM(s) IV Intermittent every 12 hours  dextrose 5%. 1000 milliLiter(s) IV Continuous <Continuous>  dextrose 5%. 1000 milliLiter(s) IV Continuous <Continuous>  dextrose 50% Injectable 25 Gram(s) IV Push once  dextrose 50% Injectable 12.5 Gram(s) IV Push once  dextrose 50% Injectable 25 Gram(s) IV Push once  glucagon  Injectable 1 milliGRAM(s) IntraMuscular once  insulin glargine Injectable (LANTUS) 20 Unit(s) SubCutaneous at bedtime  insulin lispro (ADMELOG) corrective regimen sliding scale   SubCutaneous three times a day before meals  insulin lispro (ADMELOG) corrective regimen sliding scale   SubCutaneous at bedtime  insulin lispro Injectable (ADMELOG) 2 Unit(s) SubCutaneous three times a day before meals  lisinopril 20 milliGRAM(s) Oral daily  metroNIDAZOLE  IVPB 500 milliGRAM(s) IV Intermittent every 12 hours  ticagrelor 60 milliGRAM(s) Oral every 12 hours  vancomycin  IVPB 1250 milliGRAM(s) IV Intermittent every 24 hours    MEDICATIONS (PRN):acetaminophen     Tablet .. 650 milliGRAM(s) Oral every 6 hours PRN Temp greater or equal to 38C (100.4F), Mild Pain (1 - 3)  dextrose Oral Gel 15 Gram(s) Oral once PRN Blood Glucose LESS THAN 70 milliGRAM(s)/deciliter  melatonin 3 milliGRAM(s) Oral at bedtime PRN Insomnia    --------------------------------------------------------------------------------------------    Vitals:   T(C): 36.9 (08-05-23 @ 15:11), Max: 37 (08-05-23 @ 08:52)  HR: 86 (08-05-23 @ 15:11) (66 - 86)  BP: 112/63 (08-05-23 @ 15:11) (89/51 - 121/65)  RR: 18 (08-05-23 @ 15:11) (18 - 18)  SpO2: 96% (08-05-23 @ 15:11) (95% - 98%)  CAPILLARY BLOOD GLUCOSE      POCT Blood Glucose.: 215 mg/dL (05 Aug 2023 16:21)  POCT Blood Glucose.: 278 mg/dL (05 Aug 2023 11:54)  POCT Blood Glucose.: 250 mg/dL (05 Aug 2023 07:52)  POCT Blood Glucose.: 197 mg/dL (04 Aug 2023 21:39)      Height (cm): 177.8 (08-04 @ 01:07)  Weight (kg): 103.7 (08-04 @ 01:07)  BMI (kg/m2): 32.8 (08-04 @ 01:07)  BSA (m2): 2.21 (08-04 @ 01:07)    PHYSICAL EXAM:  General: NAD, Lying in bed comfortably  Neuro: Alert and answering questions appropriately   HEENT: Grossly normal, EOMI  Cardio: Regular rate  Resp: Good effort, no signs of respiratory distress  GI/Abd: Soft, NT/ND, no rebound/guarding, no masses palpated  Vascular: All 4 extremities warm, palpable R DP, + R PT, L DP, L PT signals  - L lateral foot with open wound and eschar, L lateral dorsal erythema within drawn line, nontender, no drainage, b/l dorsal foot bullae  Musculoskeletal: All 4 extremities moving spontaneously, no limitations    --------------------------------------------------------------------------------------------    LABS  CBC (08-05 @ 06:38)                              10.3<L>                         9.18    )----------------(  267        --    % Neutrophils, --    % Lymphocytes, ANC: --                                  31.2<L>  CBC (08-04 @ 07:07)                              10.1<L>                         9.05    )----------------(  227        65.8  % Neutrophils, 22.0  % Lymphocytes, ANC: 5.95                                30.5<L>    BMP (08-05 @ 06:38)             136     |  99      |  22    		Ca++ --      Ca 8.7                ---------------------------------( 248<H>		Mg --                 3.9     |  24      |  1.93<H>			Ph --      BMP (08-04 @ 07:11)             138     |  100     |  23    		Ca++ --      Ca 8.8                ---------------------------------( 134<H>		Mg 2.1                3.7     |  23      |  1.91<H>			Ph --        LFTs (08-04 @ 07:11)      TPro 6.8 / Alb 3.7 / TBili 0.7 / DBili -- / AST 13 / ALT 12 / AlkPhos 72            --------------------------------------------------------------------------------------------    MICROBIOLOGY  Urinalysis (08-05 @ 06:38):     Color:  / Appearance:  / SG:  / pH:  / Gluc: 248<H> / Ketones:  / Bili:  / Urobili:  / Protein : / Nitrites:  / Leuk.Est:  / RBC:  / WBC:  / Sq Epi:  / Non Sq Epi:  / Bacteria        -> .Abscess left foot Culture (08-04 @ 07:28)       No polymorphonuclear leukocytes seen per low power field  Moderate Gram Positive Cocci in Clusters seen per oil power field    NG    Few Escherichia coli  Rare Staphylococcus epidermidis "Susceptibilities not performed"    -> Clean Catch Clean Catch (Midstream) Culture (08-03 @ 21:19)     NG    NG    <10,000 CFU/mL Normal Urogenital Radha    -> .Blood Blood-Peripheral Culture (08-03 @ 21:02)     NG    NG    No growth at 24 hours    -> .Blood Blood-Peripheral Culture (08-03 @ 20:45)     NG    NG    No growth at 24 hours      --------------------------------------------------------------------------------------------    IMAGING  < from: MR Foot w/wo IV Cont, Left (08.04.23 @ 23:50) >  ACC: [id number] EXAM:  MR FOOT WAW IC LT   ORDERED BY:  ABEBE JACKSON     PROCEDURE DATE:  08/04/2023          INTERPRETATION:  Exam Type: MR FOOT WITHOUT AND WITH IV CONTRAST LEFT  Exam Date and Time: 8/4/2023 11:50 PM  Indication: Concern for osteomyelitis of the fifth digit  Comparison: Left foot radiograph on 8/3/2023    TECHNIQUE:  Multiplanar multisequence MRI of the left foot was performed with   contrast.    Contrast: 10 cc Gadavist IV    FINDINGS:    There is a soft tissue ulcer adjacent to the lateral aspect of the base   of the fifth metatarsal with mild soft tissue edema and postcontrast   enhancement within the lateral aspect of the base of the fifth   metatarsal. No significant loss of normal T1 fatty marrow. Mild edema   within the muscles of the forefoot. Soft tissue swelling along the dorsum   of the foot. Mild third intermetatarsal bursitis. No acute displaced   fracture.    IMPRESSION:  Osteomyelitis within the lateral aspect of the base of the fifth   metatarsal with adjacent soft tissue ulcer.    --- End of Report ---    < end of copied text >

## 2023-08-05 NOTE — CONSULT NOTE ADULT - ATTENDING COMMENTS
Patient seen/examined. P/w Left foot wound of unknown duration in setting of DM and PAD. Pedal pules non-palpable. Recommend wound care per podiatry, antiplatelet and statin therapy if no contraindication, antibiotics. NIDHI/PVR. Scheduled for angiography on 8/10/23.

## 2023-08-05 NOTE — PROGRESS NOTE ADULT - SUBJECTIVE AND OBJECTIVE BOX
Patient is a 62y old  Male who presents with a chief complaint of Diabetic foot infection (05 Aug 2023 08:26)       INTERVAL HPI/OVERNIGHT EVENTS:  Patient seen and evaluated at bedside.  Pt is resting comfortable in NAD. Denies N/V/F/C.    Allergies    No Known Allergies    Intolerances        Vital Signs Last 24 Hrs  T(C): 37 (05 Aug 2023 08:52), Max: 37.9 (04 Aug 2023 16:07)  T(F): 98.6 (05 Aug 2023 08:52), Max: 100.2 (04 Aug 2023 16:07)  HR: 66 (05 Aug 2023 08:52) (66 - 88)  BP: 121/65 (05 Aug 2023 11:07) (89/51 - 121/65)  BP(mean): --  RR: 18 (05 Aug 2023 08:52) (18 - 18)  SpO2: 98% (05 Aug 2023 11:07) (95% - 98%)    Parameters below as of 05 Aug 2023 11:07  Patient On (Oxygen Delivery Method): room air        LABS:                        10.3   9.18  )-----------( 267      ( 05 Aug 2023 06:38 )             31.2     08-05    136  |  99  |  22  ----------------------------<  248<H>  3.9   |  24  |  1.93<H>    Ca    8.7      05 Aug 2023 06:38  Mg     2.1     08-04    TPro  6.8  /  Alb  3.7  /  TBili  0.7  /  DBili  x   /  AST  13  /  ALT  12  /  AlkPhos  72  08-04    PT/INR - ( 03 Aug 2023 21:06 )   PT: 12.4 sec;   INR: 1.13 ratio         PTT - ( 03 Aug 2023 21:06 )  PTT:29.5 sec  Urinalysis Basic - ( 05 Aug 2023 06:38 )    Color: x / Appearance: x / SG: x / pH: x  Gluc: 248 mg/dL / Ketone: x  / Bili: x / Urobili: x   Blood: x / Protein: x / Nitrite: x   Leuk Esterase: x / RBC: x / WBC x   Sq Epi: x / Non Sq Epi: x / Bacteria: x      CAPILLARY BLOOD GLUCOSE      POCT Blood Glucose.: 250 mg/dL (05 Aug 2023 07:52)  POCT Blood Glucose.: 197 mg/dL (04 Aug 2023 21:39)  POCT Blood Glucose.: 227 mg/dL (04 Aug 2023 16:29)  POCT Blood Glucose.: 237 mg/dL (04 Aug 2023 12:21)      Lower Extremity Physical Exam:  Vascular: DP/PT _/4 B/L, CFT <_sec x 10, Temp gradient_ B/L  Neurology: Epicritic sensation _ to level of _, B/L  Musculoskeletal/Ortho:   Skin:   Wound #1:  Location:  Size:  Etiology:  Depth:   Wound bed:   Drainage:  Odor:   Periwound:    RADIOLOGY & ADDITIONAL TESTS:  < from: MR Foot w/wo IV Cont, Left (08.04.23 @ 23:50) >    ACC: [id number] EXAM:  MR FOOT WAW IC LT   ORDERED BY:  ABEBE JACKSON     PROCEDURE DATE:  08/04/2023          INTERPRETATION:  Exam Type: MR FOOT WITHOUT AND WITH IV CONTRAST LEFT  Exam Date and Time: 8/4/2023 11:50 PM  Indication: Concern for osteomyelitis of the fifth digit  Comparison: Left foot radiograph on 8/3/2023    TECHNIQUE:  Multiplanar multisequence MRI of the left foot was performed with   contrast.    Contrast: 10 cc Gadavist IV    FINDINGS:    There is a soft tissue ulcer adjacent to the lateral aspect of the base   of the fifth metatarsal with mild soft tissue edema and postcontrast   enhancement within the lateral aspect of the base of the fifth   metatarsal. No significant loss of normal T1 fatty marrow. Mild edema   within the muscles of the forefoot. Soft tissue swelling along the dorsum   of the foot. Mild third intermetatarsal bursitis. No acute displaced   fracture.    IMPRESSION:  Osteomyelitis within the lateral aspect of the base of the fifth   metatarsal with adjacent soft tissue ulcer.    --- End of Report ---            CAMILLA CASTRO DO; Attending Radiologist  This document has been electronically signed. Aug  5 2023  9:42AM    < end of copied text >     Patient is a 62y old  Male who presents with a chief complaint of Diabetic foot infection (05 Aug 2023 08:26)       INTERVAL HPI/OVERNIGHT EVENTS:  Patient seen and evaluated at bedside.  Pt is resting comfortable in NAD. Denies N/V/F/C.    Allergies    No Known Allergies    Intolerances        Vital Signs Last 24 Hrs  T(C): 37 (05 Aug 2023 08:52), Max: 37.9 (04 Aug 2023 16:07)  T(F): 98.6 (05 Aug 2023 08:52), Max: 100.2 (04 Aug 2023 16:07)  HR: 66 (05 Aug 2023 08:52) (66 - 88)  BP: 121/65 (05 Aug 2023 11:07) (89/51 - 121/65)  BP(mean): --  RR: 18 (05 Aug 2023 08:52) (18 - 18)  SpO2: 98% (05 Aug 2023 11:07) (95% - 98%)    Parameters below as of 05 Aug 2023 11:07  Patient On (Oxygen Delivery Method): room air        LABS:                        10.3   9.18  )-----------( 267      ( 05 Aug 2023 06:38 )             31.2     08-05    136  |  99  |  22  ----------------------------<  248<H>  3.9   |  24  |  1.93<H>    Ca    8.7      05 Aug 2023 06:38  Mg     2.1     08-04    TPro  6.8  /  Alb  3.7  /  TBili  0.7  /  DBili  x   /  AST  13  /  ALT  12  /  AlkPhos  72  08-04    PT/INR - ( 03 Aug 2023 21:06 )   PT: 12.4 sec;   INR: 1.13 ratio         PTT - ( 03 Aug 2023 21:06 )  PTT:29.5 sec  Urinalysis Basic - ( 05 Aug 2023 06:38 )    Color: x / Appearance: x / SG: x / pH: x  Gluc: 248 mg/dL / Ketone: x  / Bili: x / Urobili: x   Blood: x / Protein: x / Nitrite: x   Leuk Esterase: x / RBC: x / WBC x   Sq Epi: x / Non Sq Epi: x / Bacteria: x      CAPILLARY BLOOD GLUCOSE      POCT Blood Glucose.: 250 mg/dL (05 Aug 2023 07:52)  POCT Blood Glucose.: 197 mg/dL (04 Aug 2023 21:39)  POCT Blood Glucose.: 227 mg/dL (04 Aug 2023 16:29)  POCT Blood Glucose.: 237 mg/dL (04 Aug 2023 12:21)      Lower Extremity Physical Exam:  Vascular: DP/PT 0/4 B/L, CFT <3sec x 10, Temp gradient_ B/L  Neurology: Epicritic sensation diminished to level of digits, B/L  Musculoskeletal/Ortho: Unremarkable.  Skin: Left foot lateral 5th metatarsal base wound to subq with well adhered eschar, no fluctuance, no bogginess, moderate malodor, mild sanguinous drainage, no pus, no purulence, periwound erythema to dorsal lateral midfoot, dorsal proximal 4-5th MTPJ semi-lanced serous filled bulla. Right foot dorsal midfoot lanced bulla, no acute signs of infection.    RADIOLOGY & ADDITIONAL TESTS:  < from: MR Foot w/wo IV Cont, Left (08.04.23 @ 23:50) >    ACC: [id number] EXAM:  MR FOOT WAW IC LT   ORDERED BY:  ABEBE JACKSON     PROCEDURE DATE:  08/04/2023          INTERPRETATION:  Exam Type: MR FOOT WITHOUT AND WITH IV CONTRAST LEFT  Exam Date and Time: 8/4/2023 11:50 PM  Indication: Concern for osteomyelitis of the fifth digit  Comparison: Left foot radiograph on 8/3/2023    TECHNIQUE:  Multiplanar multisequence MRI of the left foot was performed with   contrast.    Contrast: 10 cc Gadavist IV    FINDINGS:    There is a soft tissue ulcer adjacent to the lateral aspect of the base   of the fifth metatarsal with mild soft tissue edema and postcontrast   enhancement within the lateral aspect of the base of the fifth   metatarsal. No significant loss of normal T1 fatty marrow. Mild edema   within the muscles of the forefoot. Soft tissue swelling along the dorsum   of the foot. Mild third intermetatarsal bursitis. No acute displaced   fracture.    IMPRESSION:  Osteomyelitis within the lateral aspect of the base of the fifth   metatarsal with adjacent soft tissue ulcer.    --- End of Report ---            CAMILLA CASTRO DO; Attending Radiologist  This document has been electronically signed. Aug  5 2023  9:42AM    < end of copied text >

## 2023-08-06 LAB
-  AMIKACIN: SIGNIFICANT CHANGE UP
-  AMOXICILLIN/CLAVULANIC ACID: SIGNIFICANT CHANGE UP
-  AMPICILLIN/SULBACTAM: SIGNIFICANT CHANGE UP
-  AMPICILLIN: SIGNIFICANT CHANGE UP
-  AZTREONAM: SIGNIFICANT CHANGE UP
-  CEFAZOLIN: SIGNIFICANT CHANGE UP
-  CEFEPIME: SIGNIFICANT CHANGE UP
-  CEFTRIAXONE: SIGNIFICANT CHANGE UP
-  CIPROFLOXACIN: SIGNIFICANT CHANGE UP
-  ERTAPENEM: SIGNIFICANT CHANGE UP
-  GENTAMICIN: SIGNIFICANT CHANGE UP
-  IMIPENEM: SIGNIFICANT CHANGE UP
-  LEVOFLOXACIN: SIGNIFICANT CHANGE UP
-  MEROPENEM: SIGNIFICANT CHANGE UP
-  PIPERACILLIN/TAZOBACTAM: SIGNIFICANT CHANGE UP
-  TOBRAMYCIN: SIGNIFICANT CHANGE UP
-  TRIMETHOPRIM/SULFAMETHOXAZOLE: SIGNIFICANT CHANGE UP
ANION GAP SERPL CALC-SCNC: 13 MMOL/L — SIGNIFICANT CHANGE UP (ref 5–17)
BUN SERPL-MCNC: 28 MG/DL — HIGH (ref 7–23)
CALCIUM SERPL-MCNC: 8.8 MG/DL — SIGNIFICANT CHANGE UP (ref 8.4–10.5)
CHLORIDE SERPL-SCNC: 98 MMOL/L — SIGNIFICANT CHANGE UP (ref 96–108)
CO2 SERPL-SCNC: 22 MMOL/L — SIGNIFICANT CHANGE UP (ref 22–31)
CREAT SERPL-MCNC: 1.81 MG/DL — HIGH (ref 0.5–1.3)
EGFR: 42 ML/MIN/1.73M2 — LOW
GLUCOSE BLDC GLUCOMTR-MCNC: 199 MG/DL — HIGH (ref 70–99)
GLUCOSE BLDC GLUCOMTR-MCNC: 201 MG/DL — HIGH (ref 70–99)
GLUCOSE BLDC GLUCOMTR-MCNC: 267 MG/DL — HIGH (ref 70–99)
GLUCOSE BLDC GLUCOMTR-MCNC: 268 MG/DL — HIGH (ref 70–99)
GLUCOSE SERPL-MCNC: 297 MG/DL — HIGH (ref 70–99)
HCT VFR BLD CALC: 32.1 % — LOW (ref 39–50)
HGB BLD-MCNC: 10.6 G/DL — LOW (ref 13–17)
MCHC RBC-ENTMCNC: 28 PG — SIGNIFICANT CHANGE UP (ref 27–34)
MCHC RBC-ENTMCNC: 33 GM/DL — SIGNIFICANT CHANGE UP (ref 32–36)
MCV RBC AUTO: 84.9 FL — SIGNIFICANT CHANGE UP (ref 80–100)
METHOD TYPE: SIGNIFICANT CHANGE UP
NRBC # BLD: 0 /100 WBCS — SIGNIFICANT CHANGE UP (ref 0–0)
PLATELET # BLD AUTO: 287 K/UL — SIGNIFICANT CHANGE UP (ref 150–400)
POTASSIUM SERPL-MCNC: 3.6 MMOL/L — SIGNIFICANT CHANGE UP (ref 3.5–5.3)
POTASSIUM SERPL-SCNC: 3.6 MMOL/L — SIGNIFICANT CHANGE UP (ref 3.5–5.3)
RBC # BLD: 3.78 M/UL — LOW (ref 4.2–5.8)
RBC # FLD: 12.7 % — SIGNIFICANT CHANGE UP (ref 10.3–14.5)
SODIUM SERPL-SCNC: 133 MMOL/L — LOW (ref 135–145)
WBC # BLD: 10.66 K/UL — HIGH (ref 3.8–10.5)
WBC # FLD AUTO: 10.66 K/UL — HIGH (ref 3.8–10.5)

## 2023-08-06 PROCEDURE — 99233 SBSQ HOSP IP/OBS HIGH 50: CPT

## 2023-08-06 RX ORDER — ERTAPENEM SODIUM 1 G/1
1000 INJECTION, POWDER, LYOPHILIZED, FOR SOLUTION INTRAMUSCULAR; INTRAVENOUS EVERY 24 HOURS
Refills: 0 | Status: DISCONTINUED | OUTPATIENT
Start: 2023-08-07 | End: 2023-08-13

## 2023-08-06 RX ORDER — ERTAPENEM SODIUM 1 G/1
INJECTION, POWDER, LYOPHILIZED, FOR SOLUTION INTRAMUSCULAR; INTRAVENOUS
Refills: 0 | Status: DISCONTINUED | OUTPATIENT
Start: 2023-08-06 | End: 2023-08-13

## 2023-08-06 RX ORDER — ERTAPENEM SODIUM 1 G/1
1000 INJECTION, POWDER, LYOPHILIZED, FOR SOLUTION INTRAMUSCULAR; INTRAVENOUS ONCE
Refills: 0 | Status: COMPLETED | OUTPATIENT
Start: 2023-08-06 | End: 2023-08-06

## 2023-08-06 RX ADMIN — Medication 2 UNIT(S): at 17:04

## 2023-08-06 RX ADMIN — Medication 81 MILLIGRAM(S): at 12:42

## 2023-08-06 RX ADMIN — CEFEPIME 100 MILLIGRAM(S): 1 INJECTION, POWDER, FOR SOLUTION INTRAMUSCULAR; INTRAVENOUS at 08:49

## 2023-08-06 RX ADMIN — Medication 3: at 12:41

## 2023-08-06 RX ADMIN — TICAGRELOR 60 MILLIGRAM(S): 90 TABLET ORAL at 17:05

## 2023-08-06 RX ADMIN — ERTAPENEM SODIUM 120 MILLIGRAM(S): 1 INJECTION, POWDER, LYOPHILIZED, FOR SOLUTION INTRAMUSCULAR; INTRAVENOUS at 14:53

## 2023-08-06 RX ADMIN — Medication 2 UNIT(S): at 12:41

## 2023-08-06 RX ADMIN — INSULIN GLARGINE 20 UNIT(S): 100 INJECTION, SOLUTION SUBCUTANEOUS at 22:17

## 2023-08-06 RX ADMIN — Medication 650 MILLIGRAM(S): at 17:08

## 2023-08-06 RX ADMIN — TICAGRELOR 60 MILLIGRAM(S): 90 TABLET ORAL at 05:04

## 2023-08-06 RX ADMIN — ATORVASTATIN CALCIUM 80 MILLIGRAM(S): 80 TABLET, FILM COATED ORAL at 22:17

## 2023-08-06 RX ADMIN — Medication 1: at 17:03

## 2023-08-06 RX ADMIN — CARVEDILOL PHOSPHATE 3.12 MILLIGRAM(S): 80 CAPSULE, EXTENDED RELEASE ORAL at 17:04

## 2023-08-06 RX ADMIN — Medication 100 MILLIGRAM(S): at 05:04

## 2023-08-06 RX ADMIN — Medication 2 UNIT(S): at 08:48

## 2023-08-06 RX ADMIN — Medication 3: at 08:48

## 2023-08-06 NOTE — CHART NOTE - NSCHARTNOTEFT_GEN_A_CORE
chart rev'd after my eval of yesterday.  I d/w hospitalist.  ESBL and coag neg staph have grown as well as bacteroides.  Abx changed today to invanz.  For now will hold off on vanco but naturally can be rediscussed tomorrow by ID/team.

## 2023-08-06 NOTE — PROGRESS NOTE ADULT - SUBJECTIVE AND OBJECTIVE BOX
Jayla Mckeon MD  Hospitalist  Available on MS Teams      PROGRESS NOTE:     Patient is a 62y old  Male who presents with a chief complaint of Diabetic foot infection (05 Aug 2023 17:50)      SUBJECTIVE / OVERNIGHT EVENTS:  Overnight, the patient was febrile to 100.5F.   No acute events overnight.    Pt examined this morning. He continues to feel improved. States his foot infection is improving and swelling has gone down. He denied any fevers/chills, malaise, night sweats, diarrhea or constipation.     ADDITIONAL REVIEW OF SYSTEMS:  General: no fevers/chills , or malaise.  Respiratory and Thorax: no SOB, no cough  Cardiovascular:	no chest pain, no palpitations  Gastrointestinal:	no diarrhea  Genitourinary:	no dysuria  Skin: lower extremity cellulitis improving. Swelling in the right lower extremity improving    MEDICATIONS  (STANDING):  aspirin enteric coated 81 milliGRAM(s) Oral daily  atorvastatin 80 milliGRAM(s) Oral at bedtime  carvedilol 3.125 milliGRAM(s) Oral every 12 hours  cefepime   IVPB 2000 milliGRAM(s) IV Intermittent every 12 hours  dextrose 5%. 1000 milliLiter(s) (50 mL/Hr) IV Continuous <Continuous>  dextrose 5%. 1000 milliLiter(s) (100 mL/Hr) IV Continuous <Continuous>  dextrose 50% Injectable 25 Gram(s) IV Push once  dextrose 50% Injectable 12.5 Gram(s) IV Push once  dextrose 50% Injectable 25 Gram(s) IV Push once  glucagon  Injectable 1 milliGRAM(s) IntraMuscular once  insulin glargine Injectable (LANTUS) 20 Unit(s) SubCutaneous at bedtime  insulin lispro (ADMELOG) corrective regimen sliding scale   SubCutaneous three times a day before meals  insulin lispro (ADMELOG) corrective regimen sliding scale   SubCutaneous at bedtime  insulin lispro Injectable (ADMELOG) 2 Unit(s) SubCutaneous three times a day before meals  lisinopril 20 milliGRAM(s) Oral daily  metroNIDAZOLE  IVPB 500 milliGRAM(s) IV Intermittent every 12 hours  ticagrelor 60 milliGRAM(s) Oral every 12 hours  vancomycin  IVPB 1250 milliGRAM(s) IV Intermittent every 24 hours    MEDICATIONS  (PRN):  acetaminophen     Tablet .. 650 milliGRAM(s) Oral every 6 hours PRN Temp greater or equal to 38C (100.4F), Mild Pain (1 - 3)  dextrose Oral Gel 15 Gram(s) Oral once PRN Blood Glucose LESS THAN 70 milliGRAM(s)/deciliter  melatonin 3 milliGRAM(s) Oral at bedtime PRN Insomnia      CAPILLARY BLOOD GLUCOSE      POCT Blood Glucose.: 267 mg/dL (06 Aug 2023 12:21)  POCT Blood Glucose.: 268 mg/dL (06 Aug 2023 07:59)  POCT Blood Glucose.: 217 mg/dL (05 Aug 2023 21:14)  POCT Blood Glucose.: 215 mg/dL (05 Aug 2023 16:21)    I&O's Summary      PHYSICAL EXAM:  Vital Signs Last 24 Hrs  T(C): 36.4 (06 Aug 2023 12:45), Max: 38.1 (05 Aug 2023 20:04)  T(F): 97.5 (06 Aug 2023 12:45), Max: 100.5 (05 Aug 2023 20:04)  HR: 89 (06 Aug 2023 12:45) (78 - 89)  BP: 116/75 (06 Aug 2023 12:45) (103/60 - 122/94)  BP(mean): --  RR: 18 (06 Aug 2023 12:45) (18 - 18)  SpO2: 97% (06 Aug 2023 12:45) (95% - 97%)    Parameters below as of 06 Aug 2023 12:45  Patient On (Oxygen Delivery Method): room air        GENERAL: NAD, well-developed  HEAD:  Atraumatic, normocephalic  EYES: EOMI, conjunctiva and sclera clear  CHEST/LUNG: Clear to auscultation bilaterally; no wheezing or rales  HEART: Regular rate and rhythm; no murmurs, trace LLE edema   ABDOMEN: Soft, nontender, nondistended; bowel sounds present  EXTREMITIES:  2+ Peripheral Pulses, no clubbing, cyanosis; b/l feet wrapped, LLE with some erythema, warmth and swelling   PSYCH: AAOx3, calm affect, not anxious  SKIN: LLE with warmth and mild erythema, +skin wounds wrapped        LABS:                        10.6   10.66 )-----------( 287      ( 06 Aug 2023 06:54 )             32.1     08-06    133<L>  |  98  |  28<H>  ----------------------------<  297<H>  3.6   |  22  |  1.81<H>    Ca    8.8      06 Aug 2023 06:53            Urinalysis Basic - ( 06 Aug 2023 06:53 )    Color: x / Appearance: x / SG: x / pH: x  Gluc: 297 mg/dL / Ketone: x  / Bili: x / Urobili: x   Blood: x / Protein: x / Nitrite: x   Leuk Esterase: x / RBC: x / WBC x   Sq Epi: x / Non Sq Epi: x / Bacteria: x        Culture - Abscess with Gram Stain (collected 04 Aug 2023 07:28)  Source: .Abscess left foot  Gram Stain (04 Aug 2023 16:04):    No polymorphonuclear leukocytes seen per low power field    Moderate Gram Positive Cocci in Clusters seen per oil power field  Preliminary Report (06 Aug 2023 12:44):    Few Escherichia coli ESBL    Rare Staphylococcus epidermidis "Susceptibilities not performed"    Moderate Bacteroides fragilis "Susceptibilities not performed"  Organism: Escherichia coli ESBL (06 Aug 2023 10:01)  Organism: Escherichia coli ESBL (06 Aug 2023 10:01)    Culture - Urine (collected 03 Aug 2023 21:19)  Source: Clean Catch Clean Catch (Midstream)  Final Report (05 Aug 2023 06:44):    <10,000 CFU/mL Normal Urogenital Radha    Culture - Blood (collected 03 Aug 2023 21:02)  Source: .Blood Blood-Peripheral  Preliminary Report (06 Aug 2023 01:02):    No growth at 48 Hours    Culture - Blood (collected 03 Aug 2023 20:45)  Source: .Blood Blood-Peripheral  Preliminary Report (06 Aug 2023 01:02):    No growth at 48 Hours

## 2023-08-06 NOTE — PROGRESS NOTE ADULT - PROBLEM SELECTOR PLAN 2
pt with L foot wound with MRI showing L 5th base OM   - c/w vanco, cefepime, flagyl for now - ID following   - Wound cx showing E coli ESBL -> will notify ID regarding these results   - Bcx NGTD  - NIDHI/PVRs reviewed, vascular states that pt will benefit from LLE angiogram and possible revascularization prior to resection  - will f/u podiatry regarding possible partial ray resection   - pt with METS>4, RCRI 3 (15% 30d mortality) - pt is intermediate risk for low risk procedure, no contraindication to proceed to OR for partial ray resection  - cardiology pre-op clearance appreciated pt with L foot wound with MRI showing L 5th base OM   - currently on vanco, cefepime, flagyl - ID following   - Wound cx showing E coli ESBL -> ID notified of these results -> will discontinue cefepime and flagyl and start invanz.   - no MRSA growth, will discontinue vancomycin  - Bcx NGTD  - NIDHI/PVRs reviewed, vascular states that pt will benefit from LLE angiogram and possible revascularization prior to resection  - will f/u podiatry regarding possible partial ray resection   - pt with METS>4, RCRI 3 (15% 30d mortality) - pt is intermediate risk for low risk procedure, no contraindication to proceed to OR for partial ray resection  - cardiology pre-op clearance appreciated

## 2023-08-07 ENCOUNTER — TRANSCRIPTION ENCOUNTER (OUTPATIENT)
Age: 63
End: 2023-08-07

## 2023-08-07 PROBLEM — L89.892 PRESSURE INJURY OF LEFT FOOT, STAGE 2: Status: ACTIVE | Noted: 2023-08-07

## 2023-08-07 PROBLEM — I70.91 GENERALIZED ATHEROSCLEROSIS: Status: ACTIVE | Noted: 2023-08-07

## 2023-08-07 PROBLEM — L03.90 CELLULITIS: Status: ACTIVE | Noted: 2023-08-07

## 2023-08-07 LAB
GLUCOSE BLDC GLUCOMTR-MCNC: 193 MG/DL — HIGH (ref 70–99)
GLUCOSE BLDC GLUCOMTR-MCNC: 216 MG/DL — HIGH (ref 70–99)
GLUCOSE BLDC GLUCOMTR-MCNC: 233 MG/DL — HIGH (ref 70–99)
GLUCOSE BLDC GLUCOMTR-MCNC: 237 MG/DL — HIGH (ref 70–99)

## 2023-08-07 PROCEDURE — 99232 SBSQ HOSP IP/OBS MODERATE 35: CPT

## 2023-08-07 RX ORDER — INSULIN GLARGINE 100 [IU]/ML
12 INJECTION, SOLUTION SUBCUTANEOUS AT BEDTIME
Refills: 0 | Status: DISCONTINUED | OUTPATIENT
Start: 2023-08-07 | End: 2023-08-10

## 2023-08-07 RX ADMIN — ERTAPENEM SODIUM 120 MILLIGRAM(S): 1 INJECTION, POWDER, LYOPHILIZED, FOR SOLUTION INTRAMUSCULAR; INTRAVENOUS at 13:47

## 2023-08-07 RX ADMIN — Medication 2 UNIT(S): at 11:57

## 2023-08-07 RX ADMIN — Medication 2: at 08:20

## 2023-08-07 RX ADMIN — LISINOPRIL 20 MILLIGRAM(S): 2.5 TABLET ORAL at 05:38

## 2023-08-07 RX ADMIN — Medication 2: at 11:57

## 2023-08-07 RX ADMIN — Medication 81 MILLIGRAM(S): at 11:09

## 2023-08-07 RX ADMIN — Medication 2 UNIT(S): at 08:20

## 2023-08-07 RX ADMIN — INSULIN GLARGINE 12 UNIT(S): 100 INJECTION, SOLUTION SUBCUTANEOUS at 21:05

## 2023-08-07 RX ADMIN — Medication 2 UNIT(S): at 16:43

## 2023-08-07 RX ADMIN — CARVEDILOL PHOSPHATE 3.12 MILLIGRAM(S): 80 CAPSULE, EXTENDED RELEASE ORAL at 05:38

## 2023-08-07 RX ADMIN — TICAGRELOR 60 MILLIGRAM(S): 90 TABLET ORAL at 17:27

## 2023-08-07 RX ADMIN — CARVEDILOL PHOSPHATE 3.12 MILLIGRAM(S): 80 CAPSULE, EXTENDED RELEASE ORAL at 17:27

## 2023-08-07 RX ADMIN — Medication 2: at 16:43

## 2023-08-07 RX ADMIN — ATORVASTATIN CALCIUM 80 MILLIGRAM(S): 80 TABLET, FILM COATED ORAL at 21:04

## 2023-08-07 RX ADMIN — TICAGRELOR 60 MILLIGRAM(S): 90 TABLET ORAL at 05:38

## 2023-08-07 NOTE — HISTORY OF PRESENT ILLNESS
[Sneakers] : keke [[email or web address]] : Patient presents today because of infected left foot eschar with blistering that has been going on for a week. He presents today in [facility] and it has been getting worse for the last week. He has no sensation. The problem is getting worse. He notices the leg getting swollen. He has no fever or chills. He is very high risk for amputation.

## 2023-08-07 NOTE — PROGRESS NOTE ADULT - ASSESSMENT
62M w/ hx of CAD s/p multiple TOM 2016, IDDM2, CKD, HTN, HLD p/w L sided foot wound and chills. MRI w/ findings of OM within lateral aspect of base of 5th metatarsal. Podiatry planning possible intervention, vascular surgery consulted. ABIs done, L 1.07, R 0.75.    PLAN:   - Plan for angiography on Thursday  - Please document medical and cardiac clearance and risk stratification  - Continue ASA, statin  - Continue abx per ID recs  - Care per primary team    Vascular Surgery  [phone]

## 2023-08-07 NOTE — PROGRESS NOTE ADULT - SUBJECTIVE AND OBJECTIVE BOX
Patient is a 62y old  Male who presents with a chief complaint of Diabetic foot infection (07 Aug 2023 12:34)       INTERVAL HPI/OVERNIGHT EVENTS:  Patient seen and evaluated at bedside.  Pt is resting comfortable in NAD. Denies N/V/F/C.    Allergies    No Known Allergies    Intolerances        Vital Signs Last 24 Hrs  T(C): 36.8 (07 Aug 2023 09:21), Max: 38.3 (06 Aug 2023 17:50)  T(F): 98.3 (07 Aug 2023 09:21), Max: 100.9 (06 Aug 2023 17:50)  HR: 89 (07 Aug 2023 09:21) (78 - 90)  BP: 106/66 (07 Aug 2023 09:21) (106/66 - 117/69)  BP(mean): --  RR: 18 (07 Aug 2023 09:21) (18 - 18)  SpO2: 95% (07 Aug 2023 09:21) (92% - 96%)    Parameters below as of 07 Aug 2023 09:21  Patient On (Oxygen Delivery Method): room air        LABS:                        10.6   10.66 )-----------( 287      ( 06 Aug 2023 06:54 )             32.1     08-06    133<L>  |  98  |  28<H>  ----------------------------<  297<H>  3.6   |  22  |  1.81<H>    Ca    8.8      06 Aug 2023 06:53        Urinalysis Basic - ( 06 Aug 2023 06:53 )    Color: x / Appearance: x / SG: x / pH: x  Gluc: 297 mg/dL / Ketone: x  / Bili: x / Urobili: x   Blood: x / Protein: x / Nitrite: x   Leuk Esterase: x / RBC: x / WBC x   Sq Epi: x / Non Sq Epi: x / Bacteria: x      CAPILLARY BLOOD GLUCOSE      POCT Blood Glucose.: 233 mg/dL (07 Aug 2023 11:54)  POCT Blood Glucose.: 216 mg/dL (07 Aug 2023 08:11)  POCT Blood Glucose.: 201 mg/dL (06 Aug 2023 21:48)  POCT Blood Glucose.: 199 mg/dL (06 Aug 2023 16:52)      Lower Extremity Physical Exam:  Vascular: DP/PT 0/4 B/L, CFT <3sec x 10, Temp gradient_ B/L  Neurology: Epicritic sensation diminished to level of digits, B/L  Musculoskeletal/Ortho: Unremarkable.  Skin: Left foot lateral 5th metatarsal base wound to subq with well adhered eschar, no fluctuance, no bogginess, moderate malodor, mild sanguinous drainage, no pus, no purulence, periwound erythema to dorsal lateral midfoot, dorsal proximal 4-5th MTPJ semi-lanced serous filled bulla. Right foot dorsal midfoot lanced bulla, no acute signs of infection.    RADIOLOGY & ADDITIONAL TESTS:  < from: MR Foot w/wo IV Cont, Left (08.04.23 @ 23:50) >    ACC: [id number] EXAM:  MR FOOT WAW IC LT   ORDERED BY:  ABEBE JACKSON     PROCEDURE DATE:  08/04/2023          INTERPRETATION:  Exam Type: MR FOOT WITHOUT AND WITH IV CONTRAST LEFT  Exam Date and Time: 8/4/2023 11:50 PM  Indication: Concern for osteomyelitis of the fifth digit  Comparison: Left foot radiograph on 8/3/2023    TECHNIQUE:  Multiplanar multisequence MRI of the left foot was performed with   contrast.    Contrast: 10 cc Gadavist IV    FINDINGS:    There is a soft tissue ulcer adjacent to the lateral aspect of the base   of the fifth metatarsal with mild soft tissue edema and postcontrast   enhancement within the lateral aspect of the base of the fifth   metatarsal. No significant loss of normal T1 fatty marrow. Mild edema   within the muscles of the forefoot. Soft tissue swelling along the dorsum   of the foot. Mild third intermetatarsal bursitis. No acute displaced   fracture.    IMPRESSION:  Osteomyelitis within the lateral aspect of the base of the fifth   metatarsal with adjacent soft tissue ulcer.    --- End of Report ---            CAMILLA CASTRO DO; Attending Radiologist  This document has been electronically signed. Aug  5 2023  9:42AM    < end of copied text >

## 2023-08-07 NOTE — PROGRESS NOTE ADULT - SUBJECTIVE AND OBJECTIVE BOX
Patient is a 62y old  Male who presents with a chief complaint of Diabetic foot infection (06 Aug 2023 13:00)    HPI:  62M w/ hx of CAD s/p multiple TOM 2016, IDDM2, CKD, HTN, HLD p/w L sided foot wound and chills. Pt was visiting child and grand child in [place] ~1 week ago when his family noticed L foot lateral wound when he was walking around on a deck bare foot. After coming home, pt denies fevers but does endorse some chills. Denies pain in foot but also endorses general lack of sensation in feet. Went to visit his podiatrist today who then told him to go to the ER.    In ER: Given IV Cefepime, Vancomycin, Clindamycin, LR 1L, Tylenol 1gm IVPB (03 Aug 2023 23:32)      T(C): 37.2 (08-07-23 @ 04:54), Max: 38.3 (08-06-23 @ 17:50)  HR: 90 (08-07-23 @ 05:36) (78 - 90)  BP: 117/69 (08-07-23 @ 05:36) (108/69 - 117/69)  RR: 18 (08-07-23 @ 04:54) (18 - 18)  SpO2: 92% (08-07-23 @ 04:54) (92% - 97%)  Wt(kg): --    PHYSICAL EXAM:    PENDING    LABS:                        10.6   10.66 )-----------( 287      ( 06 Aug 2023 06:54 )             32.1     08-06    133<L>  |  98  |  28<H>  ----------------------------<  297<H>  3.6   |  22  |  1.81<H>    Ca    8.8      06 Aug 2023 06:53        Urinalysis Basic - ( 06 Aug 2023 06:53 )    Color: x / Appearance: x / SG: x / pH: x  Gluc: 297 mg/dL / Ketone: x  / Bili: x / Urobili: x   Blood: x / Protein: x / Nitrite: x   Leuk Esterase: x / RBC: x / WBC x   Sq Epi: x / Non Sq Epi: x / Bacteria: x        CAPILLARY BLOOD GLUCOSE      POCT Blood Glucose.: 216 mg/dL (07 Aug 2023 08:11)  POCT Blood Glucose.: 201 mg/dL (06 Aug 2023 21:48)  POCT Blood Glucose.: 199 mg/dL (06 Aug 2023 16:52)  POCT Blood Glucose.: 267 mg/dL (06 Aug 2023 12:21)      CAPILLARY BLOOD GLUCOSE      POCT Blood Glucose.: 216 mg/dL (07 Aug 2023 08:11)  POCT Blood Glucose.: 201 mg/dL (06 Aug 2023 21:48)  POCT Blood Glucose.: 199 mg/dL (06 Aug 2023 16:52)  POCT Blood Glucose.: 267 mg/dL (06 Aug 2023 12:21)    CAPILLARY BLOOD GLUCOSE      POCT Blood Glucose.: 216 mg/dL (07 Aug 2023 08:11)            RECENT CULTURES:                MEDS  acetaminophen     Tablet .. 650 milliGRAM(s) Oral every 6 hours PRN  aspirin enteric coated 81 milliGRAM(s) Oral daily  atorvastatin 80 milliGRAM(s) Oral at bedtime  carvedilol 3.125 milliGRAM(s) Oral every 12 hours  dextrose 5%. 1000 milliLiter(s) IV Continuous <Continuous>  dextrose 5%. 1000 milliLiter(s) IV Continuous <Continuous>  dextrose 50% Injectable 25 Gram(s) IV Push once  dextrose 50% Injectable 25 Gram(s) IV Push once  dextrose 50% Injectable 12.5 Gram(s) IV Push once  dextrose Oral Gel 15 Gram(s) Oral once PRN  ertapenem  IVPB      ertapenem  IVPB 1000 milliGRAM(s) IV Intermittent every 24 hours  glucagon  Injectable 1 milliGRAM(s) IntraMuscular once  insulin glargine Injectable (LANTUS) 20 Unit(s) SubCutaneous at bedtime  insulin lispro (ADMELOG) corrective regimen sliding scale   SubCutaneous at bedtime  insulin lispro (ADMELOG) corrective regimen sliding scale   SubCutaneous three times a day before meals  insulin lispro Injectable (ADMELOG) 2 Unit(s) SubCutaneous three times a day before meals  lisinopril 20 milliGRAM(s) Oral daily  melatonin 3 milliGRAM(s) Oral at bedtime PRN  ticagrelor 60 milliGRAM(s) Oral every 12 hours

## 2023-08-07 NOTE — PROCEDURE
[FreeTextEntry1] : X-rays were taken to evaluate for osteomyelitis. X-ray Report: I got x-rays of the left foot, 3 views. There are no obvious signs of gas noted. No periosteal break or osteomyelitis.

## 2023-08-07 NOTE — PROGRESS NOTE ADULT - SUBJECTIVE AND OBJECTIVE BOX
[facility] Division of Hospital Medicine  Kevin Biju  MS Teams      SUBJECTIVE / OVERNIGHT EVENTS:  No events overnight  denies f/chills/drainage  denies pain at site    ADDITIONAL REVIEW OF SYSTEMS:    MEDICATIONS  (STANDING):  aspirin enteric coated 81 milliGRAM(s) Oral daily  atorvastatin 80 milliGRAM(s) Oral at bedtime  carvedilol 3.125 milliGRAM(s) Oral every 12 hours  dextrose 5%. 1000 milliLiter(s) (50 mL/Hr) IV Continuous <Continuous>  dextrose 5%. 1000 milliLiter(s) (100 mL/Hr) IV Continuous <Continuous>  dextrose 50% Injectable 25 Gram(s) IV Push once  dextrose 50% Injectable 25 Gram(s) IV Push once  dextrose 50% Injectable 12.5 Gram(s) IV Push once  ertapenem  IVPB      ertapenem  IVPB 1000 milliGRAM(s) IV Intermittent every 24 hours  glucagon  Injectable 1 milliGRAM(s) IntraMuscular once  insulin glargine Injectable (LANTUS) 20 Unit(s) SubCutaneous at bedtime  insulin lispro (ADMELOG) corrective regimen sliding scale   SubCutaneous at bedtime  insulin lispro (ADMELOG) corrective regimen sliding scale   SubCutaneous three times a day before meals  insulin lispro Injectable (ADMELOG) 2 Unit(s) SubCutaneous three times a day before meals  lisinopril 20 milliGRAM(s) Oral daily  ticagrelor 60 milliGRAM(s) Oral every 12 hours    MEDICATIONS  (PRN):  acetaminophen     Tablet .. 650 milliGRAM(s) Oral every 6 hours PRN Temp greater or equal to 38C (100.4F), Mild Pain (1 - 3)  dextrose Oral Gel 15 Gram(s) Oral once PRN Blood Glucose LESS THAN 70 milliGRAM(s)/deciliter  melatonin 3 milliGRAM(s) Oral at bedtime PRN Insomnia      I&O's Summary    06 Aug 2023 07:01  -  07 Aug 2023 07:00  --------------------------------------------------------  IN: 360 mL / OUT: 0 mL / NET: 360 mL        PHYSICAL EXAM:  Vital Signs Last 24 Hrs  T(C): 36.8 (07 Aug 2023 09:21), Max: 38.3 (06 Aug 2023 17:50)  T(F): 98.3 (07 Aug 2023 09:21), Max: 100.9 (06 Aug 2023 17:50)  HR: 89 (07 Aug 2023 09:21) (78 - 90)  BP: 106/66 (07 Aug 2023 09:21) (106/66 - 117/69)  BP(mean): --  RR: 18 (07 Aug 2023 09:21) (18 - 18)  SpO2: 95% (07 Aug 2023 09:21) (92% - 97%)    Parameters below as of 07 Aug 2023 09:21  Patient On (Oxygen Delivery Method): room air      CONSTITUTIONAL: NAD, well-developed  EYES: PERRLA; conjunctiva and sclera clear  ENMT: Moist oral mucosa, no pharyngeal injection or exudates  RESPIRATORY: Normal respiratory effort; lungs are clear to auscultation bilaterally  CARDIOVASCULAR: Regular rate and rhythm, normal S1 and S2, no murmur; No lower extremity edema; Peripheral pulses are 2+ bilaterally  ABDOMEN: Nontender to palpation, normoactive bowel sounds, no rebound/guarding  MUSCULOSKELETAL:  no clubbing or cyanosis of digits; no joint swelling or tenderness to palpation  PSYCH: A+O to person, place, and time; affect appropriate  NEUROLOGY: CN 2-12 are intact and symmetric; no gross sensory deficits   SKIN: No rashes; no palpable lesions, LLE foot with mild warmth, site wrapped with gauze    LABS:                        10.6   10.66 )-----------( 287      ( 06 Aug 2023 06:54 )             32.1     08-06    133<L>  |  98  |  28<H>  ----------------------------<  297<H>  3.6   |  22  |  1.81<H>    Ca    8.8      06 Aug 2023 06:53            Urinalysis Basic - ( 06 Aug 2023 06:53 )    Color: x / Appearance: x / SG: x / pH: x  Gluc: 297 mg/dL / Ketone: x  / Bili: x / Urobili: x   Blood: x / Protein: x / Nitrite: x   Leuk Esterase: x / RBC: x / WBC x   Sq Epi: x / Non Sq Epi: x / Bacteria: x

## 2023-08-07 NOTE — ASSESSMENT
[FreeTextEntry1] : Impression: Infected ulceration, left foot. Eschar with infection and cellulitis. Generalized atherosclerosis.  Treatment: I explained to the patient that he is a very high-risk foot for amputation. There is blistering and loose tissue. I cleansed the foot. I painted it with Betadine. There is some odor to the area and there is some probing. I cleansed it and I told him he needs to immediately go to the Emergency Room. I referred him to [facility] to Dr. Gabriel Dixon and explained to him that is a very high risk foot for amputation. He is going to get started on IV antibiotics. I spoke with Dr. Dixon from Podiatry who will be seeing him and treating and he is going to get a vascular consultation as well as Infectious Disease with medical  admit. I stressed the importance of going immediately to the Emergency Room. Patient understands and will go straight to the Emergency Room. I just put a Betadine sterile dressing on. He could call the office as needed.

## 2023-08-07 NOTE — PROGRESS NOTE ADULT - ATTENDING COMMENTS
Patient seen/examined. P/w Left foot wound of unknown duration in setting of DM and PAD. Pedal pules non-palpable. NIDHI/PVR show infragenicular disease. MRI shows OM of 5th metatarsal. Recommend wound care per podiatry, antiplatelet and statin therapy if no contraindication, antibiotics. Scheduled for angiography on 8/10/23. Please document clearance.

## 2023-08-07 NOTE — PROGRESS NOTE ADULT - SUBJECTIVE AND OBJECTIVE BOX
Patient is a 62y old  Male who presents with a chief complaint of Diabetic foot infection (04 Aug 2023 12:15)    f/u wound    Interval History/ROS:  no fever.  new R phlebitis.  no n/v/d on antibiotics.  awaiting debridement and angiogram.  Remainder of ROS otherwise negative.    PAST MEDICAL & SURGICAL HISTORY:  Diabetes mellitus  CAD (coronary artery disease)  Stage 3 chronic kidney disease  Essential hypertension    Allergies  No Known Allergies    ANTIMICROBIALS:  cefepime   IVPB 2000 every 12 hours (8/3-8/6)  vancomycin  IVPB 1250 every 24 hours (8/3-8/6)  metroNIDAZOLE  IVPB (8/4-8/6)    active:  ertapenem  IVPB 1000 every 24 hours (8/6-)    MEDICATIONS  (STANDING):  aspirin enteric coated 81 daily  atorvastatin 80 at bedtime  carvedilol 3.125 every 12 hours  insulin glargine Injectable (LANTUS) 20 at bedtime  insulin lispro (ADMELOG) corrective regimen sliding scale  at bedtime  insulin lispro (ADMELOG) corrective regimen sliding scale  three times a day before meals  insulin lispro Injectable (ADMELOG) 2 three times a day before meals  lisinopril 20 daily  ticagrelor 60 every 12 hours    Vital Signs Last 24 Hrs  T(F): 98.3 (08-07-23 @ 09:21), Max: 100.9 (08-06-23 @ 17:50)  HR: 89 (08-07-23 @ 09:21)  BP: 106/66 (08-07-23 @ 09:21)  RR: 18 (08-07-23 @ 09:21)  SpO2: 95% (08-07-23 @ 09:21) (92% - 97%)    PHYSICAL EXAM:  Constitutional: non-toxic, no distress  HEAD/EYES: anicteric  ENT:  supple, no thrush  Cardiovascular:   normal S1, S2  Respiratory:  clear BS bilaterally  GI:  soft, non-tender, normal bowel sounds  :  no santos  Musculoskeletal:  no synovitis  Neurologic: awake and alert, normal strength, b/l feet with neuropathy  Skin:  L foot lateral eschar with improved malodor, R foot blister, no infection  Psychiatric:  awake, alert, appropriate mood                                10.6   10.66 )-----------( 287      ( 06 Aug 2023 06:54 )             32.1 08-06    133  |  98  |  28  ----------------------------<  297  3.6   |  22  |  1.81  Ca    8.8      06 Aug 2023 06:53    Sedimentation Rate, Erythrocyte: 96 (08-03 @ 21:06)  C-Reactive Protein, Serum: 61 (08-03 @ 21:06)    MICROBIOLOGY:  Culture - Abscess with Gram Stain (collected 08-04-23 @ 07:28)  Source: .Abscess left foot  Gram Stain (08-04-23 @ 16:04):    No polymorphonuclear leukocytes seen per low power field    Moderate Gram Positive Cocci in Clusters seen per oil power field  Preliminary Report (08-06-23 @ 12:44):    Few Escherichia coli ESBL    Rare Staphylococcus epidermidis "Susceptibilities not performed"    Moderate Bacteroides fragilis "Susceptibilities not performed"  Organism: Escherichia coli ESBL (08-06-23 @ 10:01)  Organism: Escherichia coli ESBL (08-06-23 @ 10:01)      Method Type: CORTES      -  Amikacin: S <=16      -  Amoxicillin/Clavulanic Acid: I 16/8      -  Ampicillin: R 16 These ampicillin results predict results for amoxicillin      -  Ampicillin/Sulbactam: R <=4/2 Enterobacter, Klebsiella aerogenes, Citrobacter, and Serratia may develop resistance during prolonged therapy (3-4 days)      -  Aztreonam: R >16      -  Cefazolin: R >16 Enterobacter, Klebsiella aerogenes, Citrobacter, and Serratia may develop resistance during prolonged therapy (3-4 days)      -  Cefepime: R 8      -  Ceftriaxone: R <=1 Enterobacter, Klebsiella aerogenes, Citrobacter, and Serratia may develop resistance during prolonged therapy      -  Ciprofloxacin: I 0.5      -  Ertapenem: S <=0.5      -  Gentamicin: S <=2      -  Imipenem: S <=1      -  Levofloxacin: S <=0.5      -  Meropenem: S <=1      -  Piperacillin/Tazobactam: R <=8      -  Tobramycin: R >8      -  Trimethoprim/Sulfamethoxazole: R >2/38    Culture - Urine (collected 08-03-23 @ 21:19)  Source: Clean Catch Clean Catch (Midstream)  Final Report (08-05-23 @ 06:44):    <10,000 CFU/mL Normal Urogenital Radha    Culture - Blood (collected 08-03-23 @ 21:02)  Source: .Blood Blood-Peripheral  Preliminary Report (08-07-23 @ 01:01):    No growth at 72 Hours    Culture - Blood (collected 08-03-23 @ 20:45)  Source: .Blood Blood-Peripheral  Preliminary Report (08-07-23 @ 01:01):    No growth at 72 Hours    RADIOLOGY:  imaging below personally reviewed and agree with findings    MR Foot w/wo IV Cont, Left (08.04.23 @ 23:50) >  IMPRESSION:  Osteomyelitis within the lateral aspect of the base of the fifth metatarsal with adjacent soft tissue ulcer.    Xray Foot AP + Lateral, Left (08.03.23 @ 21:26) >  IMPRESSION:  Small ulcer about the base of the left fifth metatarsal with a clustered foci of soft tissue gas, suggestive of infection. No definite radiographic evidence for osteomyelitis at the base of the fifth metatarsal; MRI would provide a more sensitive evaluation.    Xray Chest 1 View AP/PA (08.03.23 @ 21:26) >  IMPRESSION:  Clear lungs.

## 2023-08-07 NOTE — PROGRESS NOTE ADULT - ASSESSMENT
62M presents with left foot wound to subq and BL foot bullae.  - Patient seen and evaluated  - Afebrile, WBC 10.66, ESR 96, CRP 6.1  - Left foot lateral 5th metatarsal base wound to subq with well adhered eschar, no fluctuance, no bogginess, moderate malodor, mild sanguinous drainage, no pus, no purulence, periwound erythema to dorsal lateral midfoot, dorsal proximal 4-5th MTPJ semi-lanced serous filled bulla. Right foot dorsal midfoot lanced bulla, no acute signs of infection.  - Lef Foot X-Ray: Gas foci consistent with wound, 5th metatarsal base possible OM.  - Left Foot Culture: ESBL, staph epidermis, bacteroides fragilis   - ID recs, appreciated.  - Vascular tentatively planning angio thursday 8/10, appreciated  - NIDHI/PVR: RABI 1.07, RTBI 0.75, LABI 0.75, LTBI 0.66   - Pod plan left foot wound debridement with graft application w/ bone biopsy tomorrow 8/7 after 3PM w/ Dr. Urbina   - Documented medical clearance for possible podiatric surgery under anesthesia, appreciated.  - Documented cardiac clearance for possible podiatric surgery under anesthesia, appreciated   - Discussed with attending 62M presents with left foot wound to subq and BL foot bullae.  - Patient seen and evaluated  - Afebrile, WBC 10.66, ESR 96, CRP 6.1  - Left foot lateral 5th metatarsal base wound to subq with well adhered eschar, no fluctuance, no bogginess, moderate malodor, mild sanguinous drainage, no pus, no purulence, periwound erythema to dorsal lateral midfoot, dorsal proximal 4-5th MTPJ semi-lanced serous filled bulla. Right foot dorsal midfoot lanced bulla, no acute signs of infection.  - Lef Foot X-Ray: Gas foci consistent with wound, 5th metatarsal base possible OM.  - Left Foot Culture: ESBL, staph epidermis, bacteroides fragilis   - ID recs, appreciated.  - Vascular tentatively planning angio thursday 8/10, appreciated  - NIDHI/PVR: RABI 1.07, RTBI 0.75, LABI 0.75, LTBI 0.66   - Pod plan left foot wound debridement with graft application w/ bone biopsy tomorrow 8/8 after 3PM w/ Dr. Urbina   - Documented medical clearance for possible podiatric surgery under anesthesia, appreciated.  - Documented cardiac clearance for possible podiatric surgery under anesthesia, appreciated   - Discussed with attending 62M presents with left foot wound to subq and BL foot bullae.  - Patient seen and evaluated  - Afebrile, WBC 10.66, ESR 96, CRP 6.1  - Left foot lateral 5th metatarsal base wound to subq with well adhered eschar, no fluctuance, no bogginess, moderate malodor, mild sanguinous drainage, no pus, no purulence, periwound erythema to dorsal lateral midfoot, dorsal proximal 4-5th MTPJ semi-lanced serous filled bulla. Right foot dorsal midfoot lanced bulla, no acute signs of infection.  - Lef Foot X-Ray: Gas foci consistent with wound, 5th metatarsal base possible OM.  - Left Foot Culture: ESBL, staph epidermis, bacteroides fragilis   - ID recs, appreciated.  - Vascular tentatively planning angio thursday 8/10, appreciated  - NIDHI/PVR: RABI 1.07, RTBI 0.75, LABI 0.75, LTBI 0.66   - Pod plan left foot wound debridement with graft application w/ bone biopsy tomorrow 8/8 after 3PM w/ Dr. Urbina   - Documented medical clearance for possible podiatric surgery under anesthesia, appreciated.  - Documented cardiac clearance for possible podiatric surgery under anesthesia, appreciated   - NPO after midnight  - CBC, BMP, Coags, and Type and screen ordered for AM   - Seen with attending

## 2023-08-07 NOTE — PROGRESS NOTE ADULT - PROBLEM SELECTOR PLAN 2
pt with L foot wound with MRI showing L 5th base OM   - currently on vanco, cefepime, flagyl - ID following   - Wound cx showing ESBL->ertapenem  - NIDHI/PVRs reviewed, vascular states that pt will benefit from LLE angiogram and possible revascularization   - will f/u podiatry regarding possible partial ray resection   - pt with METS>4, RCRI 3 (15% 30d mortality) - pt is intermediate risk for low risk procedure, no contraindication to proceed to OR for partial ray resection  - cardiology pre-op clearance appreciated

## 2023-08-07 NOTE — PHYSICAL EXAM
[0] : left foot dorsalis pedis 0 [FreeTextEntry3] : Absent hair growth.  [FreeTextEntry4] : absent vibratory  [FreeTextEntry8] : absent vibratory [FreeTextEntry1] : [place] monofilament testing absent.

## 2023-08-07 NOTE — PROGRESS NOTE ADULT - ASSESSMENT
62M w/ hx of CAD s/p multiple TOM 2016, IDDM2, CKD, HTN, HLD admitted 8/3 c/o L sided foot wound and chills.  Patient has DM over 20 years and has neuropathy.  Was visiting [place] (family) and was wearing new shoes.  Developed a blister R top of his foot and wound left foot.  Here he had chills at home.  In the .3.  BC drawn.  Given clindamycin, vancomycin and zosyn.  Noted to have malodor.  XRAY (-).  MRI confirms OM    left foot infection with eschar and malodorous  - MRI with OM  - culture (+) esbl ecoli, bacteroides  - continue ertapenem D#5 antibiotics  - for debridement by podiatry and angiogram    Phlebitis  - remove R piv  - warm compresses, arm elevation  - if fever, please obtain BC    I have discussed plan of care as detailed above with hospitalist and NP

## 2023-08-08 ENCOUNTER — RESULT REVIEW (OUTPATIENT)
Age: 63
End: 2023-08-08

## 2023-08-08 ENCOUNTER — TRANSCRIPTION ENCOUNTER (OUTPATIENT)
Age: 63
End: 2023-08-08

## 2023-08-08 LAB
ANION GAP SERPL CALC-SCNC: 12 MMOL/L — SIGNIFICANT CHANGE UP (ref 5–17)
APTT BLD: 28.7 SEC — SIGNIFICANT CHANGE UP (ref 24.5–35.6)
BLD GP AB SCN SERPL QL: NEGATIVE — SIGNIFICANT CHANGE UP
BUN SERPL-MCNC: 27 MG/DL — HIGH (ref 7–23)
CALCIUM SERPL-MCNC: 8.8 MG/DL — SIGNIFICANT CHANGE UP (ref 8.4–10.5)
CHLORIDE SERPL-SCNC: 99 MMOL/L — SIGNIFICANT CHANGE UP (ref 96–108)
CO2 SERPL-SCNC: 23 MMOL/L — SIGNIFICANT CHANGE UP (ref 22–31)
CREAT SERPL-MCNC: 1.75 MG/DL — HIGH (ref 0.5–1.3)
EGFR: 43 ML/MIN/1.73M2 — LOW
GLUCOSE BLDC GLUCOMTR-MCNC: 149 MG/DL — HIGH (ref 70–99)
GLUCOSE BLDC GLUCOMTR-MCNC: 150 MG/DL — HIGH (ref 70–99)
GLUCOSE BLDC GLUCOMTR-MCNC: 167 MG/DL — HIGH (ref 70–99)
GLUCOSE BLDC GLUCOMTR-MCNC: 217 MG/DL — HIGH (ref 70–99)
GLUCOSE BLDC GLUCOMTR-MCNC: 233 MG/DL — HIGH (ref 70–99)
GLUCOSE SERPL-MCNC: 188 MG/DL — HIGH (ref 70–99)
HCT VFR BLD CALC: 30.9 % — LOW (ref 39–50)
HGB BLD-MCNC: 10.2 G/DL — LOW (ref 13–17)
INR BLD: 1.21 RATIO — HIGH (ref 0.85–1.18)
MCHC RBC-ENTMCNC: 28.3 PG — SIGNIFICANT CHANGE UP (ref 27–34)
MCHC RBC-ENTMCNC: 33 GM/DL — SIGNIFICANT CHANGE UP (ref 32–36)
MCV RBC AUTO: 85.6 FL — SIGNIFICANT CHANGE UP (ref 80–100)
NRBC # BLD: 0 /100 WBCS — SIGNIFICANT CHANGE UP (ref 0–0)
PLATELET # BLD AUTO: 308 K/UL — SIGNIFICANT CHANGE UP (ref 150–400)
POTASSIUM SERPL-MCNC: 3.6 MMOL/L — SIGNIFICANT CHANGE UP (ref 3.5–5.3)
POTASSIUM SERPL-SCNC: 3.6 MMOL/L — SIGNIFICANT CHANGE UP (ref 3.5–5.3)
PROTHROM AB SERPL-ACNC: 13.2 SEC — HIGH (ref 9.5–13)
RBC # BLD: 3.61 M/UL — LOW (ref 4.2–5.8)
RBC # FLD: 12.5 % — SIGNIFICANT CHANGE UP (ref 10.3–14.5)
RH IG SCN BLD-IMP: POSITIVE — SIGNIFICANT CHANGE UP
SODIUM SERPL-SCNC: 134 MMOL/L — LOW (ref 135–145)
WBC # BLD: 9.34 K/UL — SIGNIFICANT CHANGE UP (ref 3.8–10.5)
WBC # FLD AUTO: 9.34 K/UL — SIGNIFICANT CHANGE UP (ref 3.8–10.5)

## 2023-08-08 PROCEDURE — 99232 SBSQ HOSP IP/OBS MODERATE 35: CPT

## 2023-08-08 PROCEDURE — 88311 DECALCIFY TISSUE: CPT | Mod: 26

## 2023-08-08 PROCEDURE — 88304 TISSUE EXAM BY PATHOLOGIST: CPT | Mod: 26

## 2023-08-08 RX ORDER — HYDROMORPHONE HYDROCHLORIDE 2 MG/ML
0.25 INJECTION INTRAMUSCULAR; INTRAVENOUS; SUBCUTANEOUS
Refills: 0 | Status: DISCONTINUED | OUTPATIENT
Start: 2023-08-08 | End: 2023-08-08

## 2023-08-08 RX ORDER — HYDROMORPHONE HYDROCHLORIDE 2 MG/ML
0.5 INJECTION INTRAMUSCULAR; INTRAVENOUS; SUBCUTANEOUS
Refills: 0 | Status: DISCONTINUED | OUTPATIENT
Start: 2023-08-08 | End: 2023-08-08

## 2023-08-08 RX ORDER — CHLORHEXIDINE GLUCONATE 213 G/1000ML
1 SOLUTION TOPICAL DAILY
Refills: 0 | Status: DISCONTINUED | OUTPATIENT
Start: 2023-08-08 | End: 2023-08-13

## 2023-08-08 RX ORDER — ONDANSETRON 8 MG/1
4 TABLET, FILM COATED ORAL ONCE
Refills: 0 | Status: DISCONTINUED | OUTPATIENT
Start: 2023-08-08 | End: 2023-08-08

## 2023-08-08 RX ORDER — OXYCODONE AND ACETAMINOPHEN 5; 325 MG/1; MG/1
1 TABLET ORAL EVERY 4 HOURS
Refills: 0 | Status: DISCONTINUED | OUTPATIENT
Start: 2023-08-08 | End: 2023-08-13

## 2023-08-08 RX ORDER — HYDROMORPHONE HYDROCHLORIDE 2 MG/ML
0.5 INJECTION INTRAMUSCULAR; INTRAVENOUS; SUBCUTANEOUS EVERY 4 HOURS
Refills: 0 | Status: DISCONTINUED | OUTPATIENT
Start: 2023-08-08 | End: 2023-08-13

## 2023-08-08 RX ORDER — ACETAMINOPHEN 500 MG
1000 TABLET ORAL ONCE
Refills: 0 | Status: DISCONTINUED | OUTPATIENT
Start: 2023-08-08 | End: 2023-08-08

## 2023-08-08 RX ADMIN — ERTAPENEM SODIUM 120 MILLIGRAM(S): 1 INJECTION, POWDER, LYOPHILIZED, FOR SOLUTION INTRAMUSCULAR; INTRAVENOUS at 14:05

## 2023-08-08 RX ADMIN — TICAGRELOR 60 MILLIGRAM(S): 90 TABLET ORAL at 05:17

## 2023-08-08 RX ADMIN — Medication 2: at 08:28

## 2023-08-08 RX ADMIN — INSULIN GLARGINE 12 UNIT(S): 100 INJECTION, SOLUTION SUBCUTANEOUS at 22:00

## 2023-08-08 RX ADMIN — Medication 81 MILLIGRAM(S): at 11:33

## 2023-08-08 RX ADMIN — Medication 2: at 12:35

## 2023-08-08 RX ADMIN — CHLORHEXIDINE GLUCONATE 1 APPLICATION(S): 213 SOLUTION TOPICAL at 11:35

## 2023-08-08 RX ADMIN — ATORVASTATIN CALCIUM 80 MILLIGRAM(S): 80 TABLET, FILM COATED ORAL at 22:00

## 2023-08-08 NOTE — PROGRESS NOTE ADULT - ATTENDING COMMENTS
agree with above treatment plan for surgical debridemont with bone biopsy and possible graft application agree with above treatment plan for surgical debridemont with bone biopsy and possible graft application    Agreed with above.

## 2023-08-08 NOTE — PROGRESS NOTE ADULT - SUBJECTIVE AND OBJECTIVE BOX
Patient is a 62y old  Male who presents with a chief complaint of Diabetic foot infection (04 Aug 2023 12:15)    f/u wound    Interval History/ROS:  no fever. R phlebitis yesteday PIV removed..  no n/v/d on antibiotics.  awaiting debridement and angiogram.  Remainder of ROS otherwise negative.    PAST MEDICAL & SURGICAL HISTORY:  Diabetes mellitus  CAD (coronary artery disease)  Stage 3 chronic kidney disease  Essential hypertension    Allergies  No Known Allergies    ANTIMICROBIALS:  cefepime   IVPB 2000 every 12 hours (8/3-8/6)  vancomycin  IVPB 1250 every 24 hours (8/3-8/6)  metroNIDAZOLE  IVPB (8/4-8/6)    active:  ertapenem  IVPB 1000 every 24 hours (8/6-)    MEDICATIONS  (STANDING):  aspirin enteric coated 81 daily  atorvastatin 80 at bedtime  carvedilol 3.125 every 12 hours  insulin glargine Injectable (LANTUS) 12 at bedtime  insulin lispro (ADMELOG) corrective regimen sliding scale  three times a day before meals  insulin lispro (ADMELOG) corrective regimen sliding scale  at bedtime  insulin lispro Injectable (ADMELOG) 2 three times a day before meals  lisinopril 20 daily  ticagrelor 60 every 12 hours    Vital Signs Last 24 Hrs  T(F): 99.4 (08-08-23 @ 08:54), Max: 99.6 (08-07-23 @ 23:14)  HR: 78 (08-08-23 @ 08:54)  BP: 137/73 (08-08-23 @ 08:54)  RR: 18 (08-08-23 @ 08:54)  SpO2: 99% (08-08-23 @ 08:54) (95% - 99%)    PHYSICAL EXAM:  Constitutional: non-toxic, no distress  HEAD/EYES: anicteric  ENT:  supple, no thrush  Cardiovascular:   normal S1, S2  Respiratory:  clear BS bilaterally  GI:  soft, non-tender, normal bowel sounds  :  no santos  Musculoskeletal:  no synovitis  Neurologic: awake and alert, normal strength, b/l feet with neuropathy  Skin:  L foot lateral eschar with improved malodor, R foot blister, no infection  Psychiatric:  awake, alert, appropriate mood                                  10.2   9.34  )-----------( 308      ( 08 Aug 2023 07:12 )             30.9 08-08    134  |  99  |  27  ----------------------------<  188  3.6   |  23  |  1.75  Ca    8.8      08 Aug 2023 07:12    WBC Count: 9.34 (08-08-23 @ 07:12)  WBC Count: 10.66 (08-06-23 @ 06:54)  WBC Count: 9.18 (08-05-23 @ 06:38)  WBC Count: 9.05 (08-04-23 @ 07:07)  WBC Count: 10.36 (08-03-23 @ 21:06)    Creatinine: 1.75 (08-08)  Creatinine: 1.81 (08-06)  Creatinine: 1.93 (08-05)  Creatinine: 1.91 (08-04)  Creatinine: 1.91 (08-03)    Sedimentation Rate, Erythrocyte: 96 (08-03 @ 21:06)  C-Reactive Protein, Serum: 61 (08-03 @ 21:06)    MICROBIOLOGY:  Culture - Abscess with Gram Stain (collected 08-04-23 @ 07:28)  Source: .Abscess left foot  Gram Stain (08-04-23 @ 16:04):    No polymorphonuclear leukocytes seen per low power field    Moderate Gram Positive Cocci in Clusters seen per oil power field  Preliminary Report (08-06-23 @ 12:44):    Few Escherichia coli ESBL    Rare Staphylococcus epidermidis "Susceptibilities not performed"    Moderate Bacteroides fragilis "Susceptibilities not performed"  Organism: Escherichia coli ESBL (08-06-23 @ 10:01)  Organism: Escherichia coli ESBL (08-06-23 @ 10:01)      Method Type: CORTES      -  Amikacin: S <=16      -  Amoxicillin/Clavulanic Acid: I 16/8      -  Ampicillin: R 16 These ampicillin results predict results for amoxicillin      -  Ampicillin/Sulbactam: R <=4/2 Enterobacter, Klebsiella aerogenes, Citrobacter, and Serratia may develop resistance during prolonged therapy (3-4 days)      -  Aztreonam: R >16      -  Cefazolin: R >16 Enterobacter, Klebsiella aerogenes, Citrobacter, and Serratia may develop resistance during prolonged therapy (3-4 days)      -  Cefepime: R 8      -  Ceftriaxone: R <=1 Enterobacter, Klebsiella aerogenes, Citrobacter, and Serratia may develop resistance during prolonged therapy      -  Ciprofloxacin: I 0.5      -  Ertapenem: S <=0.5      -  Gentamicin: S <=2      -  Imipenem: S <=1      -  Levofloxacin: S <=0.5      -  Meropenem: S <=1      -  Piperacillin/Tazobactam: R <=8      -  Tobramycin: R >8      -  Trimethoprim/Sulfamethoxazole: R >2/38    Culture - Urine (collected 08-03-23 @ 21:19)  Source: Clean Catch Clean Catch (Midstream)  Final Report (08-05-23 @ 06:44):    <10,000 CFU/mL Normal Urogenital Radha    Culture - Blood (collected 08-03-23 @ 21:02)  Source: .Blood Blood-Peripheral  Preliminary Report (08-07-23 @ 01:01):    No growth at 72 Hours    Culture - Blood (collected 08-03-23 @ 20:45)  Source: .Blood Blood-Peripheral  Preliminary Report (08-07-23 @ 01:01):    No growth at 72 Hours    RADIOLOGY:  imaging below personally reviewed and agree with findings    MR Foot w/wo IV Cont, Left (08.04.23 @ 23:50) >  IMPRESSION:  Osteomyelitis within the lateral aspect of the base of the fifth metatarsal with adjacent soft tissue ulcer.    Xray Foot AP + Lateral, Left (08.03.23 @ 21:26) >  IMPRESSION:  Small ulcer about the base of the left fifth metatarsal with a clustered foci of soft tissue gas, suggestive of infection. No definite radiographic evidence for osteomyelitis at the base of the fifth metatarsal; MRI would provide a more sensitive evaluation.    Xray Chest 1 View AP/PA (08.03.23 @ 21:26) >  IMPRESSION:  Clear lungs.

## 2023-08-08 NOTE — DISCHARGE NOTE PROVIDER - NSDCFUADDINST_GEN_ALL_CORE_FT
Podiatry Discharge Instructions:  Follow up: Please follow up with Dr. Urbina within 1 week of discharge from the hospital, please call [phone] for appointment and discuss that you recently were seen in the hospital.  Wound Care: Please leave your dressing clean dry intact until your follow up appointment  Weight bearing: Please weight bear as tolerated to the left heel in a surgical shoe.  Antibiotics: Please continue as instructed.

## 2023-08-08 NOTE — PROGRESS NOTE ADULT - SUBJECTIVE AND OBJECTIVE BOX
Patient is a 62y old  Male who presents with a chief complaint of Diabetic foot infection (07 Aug 2023 16:06)      INTERVAL HPI/OVERNIGHT EVENTS:   Pt is scheduled for left foot wound debridement and bone biopsy with graft application with Dr. Urbina at 5pm. Patient is aware of procedure and is NPO since midnight.    MEDICATIONS  (STANDING):  aspirin enteric coated 81 milliGRAM(s) Oral daily  atorvastatin 80 milliGRAM(s) Oral at bedtime  carvedilol 3.125 milliGRAM(s) Oral every 12 hours  chlorhexidine 2% Cloths 1 Application(s) Topical daily  dextrose 5%. 1000 milliLiter(s) (100 mL/Hr) IV Continuous <Continuous>  dextrose 5%. 1000 milliLiter(s) (50 mL/Hr) IV Continuous <Continuous>  dextrose 50% Injectable 25 Gram(s) IV Push once  dextrose 50% Injectable 12.5 Gram(s) IV Push once  dextrose 50% Injectable 25 Gram(s) IV Push once  ertapenem  IVPB      ertapenem  IVPB 1000 milliGRAM(s) IV Intermittent every 24 hours  glucagon  Injectable 1 milliGRAM(s) IntraMuscular once  insulin glargine Injectable (LANTUS) 12 Unit(s) SubCutaneous at bedtime  insulin lispro (ADMELOG) corrective regimen sliding scale   SubCutaneous three times a day before meals  insulin lispro (ADMELOG) corrective regimen sliding scale   SubCutaneous at bedtime  insulin lispro Injectable (ADMELOG) 2 Unit(s) SubCutaneous three times a day before meals  lisinopril 20 milliGRAM(s) Oral daily  ticagrelor 60 milliGRAM(s) Oral every 12 hours    MEDICATIONS  (PRN):  acetaminophen     Tablet .. 650 milliGRAM(s) Oral every 6 hours PRN Temp greater or equal to 38C (100.4F), Mild Pain (1 - 3)  dextrose Oral Gel 15 Gram(s) Oral once PRN Blood Glucose LESS THAN 70 milliGRAM(s)/deciliter  melatonin 3 milliGRAM(s) Oral at bedtime PRN Insomnia      Allergies    No Known Allergies    Intolerances        Vital Signs Last 24 Hrs  T(C): 37.4 (08 Aug 2023 08:54), Max: 37.6 (07 Aug 2023 23:14)  T(F): 99.4 (08 Aug 2023 08:54), Max: 99.6 (07 Aug 2023 23:14)  HR: 78 (08 Aug 2023 08:54) (78 - 102)  BP: 137/73 (08 Aug 2023 08:54) (93/57 - 137/73)  BP(mean): --  RR: 18 (08 Aug 2023 08:54) (18 - 18)  SpO2: 99% (08 Aug 2023 08:54) (95% - 99%)    Parameters below as of 08 Aug 2023 08:54  Patient On (Oxygen Delivery Method): room air        LABS:                        10.2   9.34  )-----------( 308      ( 08 Aug 2023 07:12 )             30.9     08-08    134<L>  |  99  |  27<H>  ----------------------------<  188<H>  3.6   |  23  |  1.75<H>    Ca    8.8      08 Aug 2023 07:12      PT/INR - ( 08 Aug 2023 07:12 )   PT: 13.2 sec;   INR: 1.21 ratio         PTT - ( 08 Aug 2023 07:12 )  PTT:28.7 sec  Urinalysis Basic - ( 08 Aug 2023 07:12 )    Color: x / Appearance: x / SG: x / pH: x  Gluc: 188 mg/dL / Ketone: x  / Bili: x / Urobili: x   Blood: x / Protein: x / Nitrite: x   Leuk Esterase: x / RBC: x / WBC x   Sq Epi: x / Non Sq Epi: x / Bacteria: x      CAPILLARY BLOOD GLUCOSE      POCT Blood Glucose.: 233 mg/dL (08 Aug 2023 08:13)  POCT Blood Glucose.: 193 mg/dL (07 Aug 2023 21:25)  POCT Blood Glucose.: 237 mg/dL (07 Aug 2023 16:30)  POCT Blood Glucose.: 233 mg/dL (07 Aug 2023 11:54)      RADIOLOGY & ADDITIONAL TESTS:    Plan:   To OR today at 5pm with Dr. Urbina for left foot wound debridement and bone biopsy with graft application.   CXR on sunrise.  EKG on sunrise.  Medical/Cardiac clearance since 8/4 and 8/5, respectively, and documented in chart.  Consent signed and in chart.  Procedure was explained to patient in detail. All alternatives, risks and complications were discussed. All questions answered.

## 2023-08-08 NOTE — PROGRESS NOTE ADULT - ASSESSMENT
62M w/ hx of CAD s/p multiple TOM 2016, IDDM2, CKD, HTN, HLD admitted 8/3 c/o L sided foot wound and chills.  Patient has DM over 20 years and has neuropathy.  Was visiting [place] (family) and was wearing new shoes.  Developed a blister R top of his foot and wound left foot.  Here he had chills at home.  In the .3.  BC drawn.  Given clindamycin, vancomycin and zosyn.  Noted to have malodor.  XRAY (-).  MRI confirms OM    left foot infection with eschar and malodorous  - MRI with OM  - culture (+) esbl ecoli, bacteroides  - continue ertapenem D#6 antibiotics  - for debridement by podiatry and angiogram    Phlebitis  - R piv out  - warm compresses, arm elevation  - if fever, please obtain BC

## 2023-08-08 NOTE — DISCHARGE NOTE PROVIDER - ATTENDING DISCHARGE PHYSICAL EXAMINATION:
CONSTITUTIONAL: NAD, well-developed  EYES:  conjunctiva and sclera clear  ENMT: Moist oral mucosa, no pharyngeal injection or exudates  RESPIRATORY: Normal respiratory effort; lungs are clear to auscultation bilaterally  CARDIOVASCULAR: Regular rate and rhythm, normal S1 and S2, no murmur; No lower extremity edema. +PICC line in the left proximal arm, area appears clean  ABDOMEN: Nontender to palpation,   PSYCH: A+O to person, place, and time; affect appropriate  NEUROLOGY:; no gross sensory deficits   SKIN: No rashes; no palpable lesions, LLE foot wrapped, Ace bandage. +wound vac in the left lower extremity in place.

## 2023-08-08 NOTE — DISCHARGE NOTE PROVIDER - CARE PROVIDER_API CALL
Virginia Mcmullen  Internal Medicine  [address] [place] [id number]  Phone: [phone]  Fax: [phone]  Follow Up Time: 1 week    Bharti Quispe  Interventional Cardiology  [address] [place] [address]  Phone: [phone]  Fax: [phone]  Follow Up Time: Routine    Brian Urbina  Podiatric Medicine and Surgery  [address] [place] [address]  Phone: [phone]  Fax: [phone]  Follow Up Time: 1 week    Sonia Mckeon  Infectious Disease  [address] [place] [address]  Phone: [phone]  Fax: [phone]  Follow Up Time: 1 week

## 2023-08-08 NOTE — PROGRESS NOTE ADULT - SUBJECTIVE AND OBJECTIVE BOX
[facility] Division of Hospital Medicine  Kevin Ivy  MS Teams      SUBJECTIVE / OVERNIGHT EVENTS:  No events overnight  R arm PIV site tender, erythema mild  denies f/chills  Denies pain in RLE/drainage    ADDITIONAL REVIEW OF SYSTEMS:    MEDICATIONS  (STANDING):  aspirin enteric coated 81 milliGRAM(s) Oral daily  atorvastatin 80 milliGRAM(s) Oral at bedtime  carvedilol 3.125 milliGRAM(s) Oral every 12 hours  chlorhexidine 2% Cloths 1 Application(s) Topical daily  dextrose 5%. 1000 milliLiter(s) (50 mL/Hr) IV Continuous <Continuous>  dextrose 5%. 1000 milliLiter(s) (100 mL/Hr) IV Continuous <Continuous>  dextrose 50% Injectable 25 Gram(s) IV Push once  dextrose 50% Injectable 12.5 Gram(s) IV Push once  dextrose 50% Injectable 25 Gram(s) IV Push once  ertapenem  IVPB      ertapenem  IVPB 1000 milliGRAM(s) IV Intermittent every 24 hours  glucagon  Injectable 1 milliGRAM(s) IntraMuscular once  insulin glargine Injectable (LANTUS) 12 Unit(s) SubCutaneous at bedtime  insulin lispro (ADMELOG) corrective regimen sliding scale   SubCutaneous at bedtime  insulin lispro (ADMELOG) corrective regimen sliding scale   SubCutaneous three times a day before meals  insulin lispro Injectable (ADMELOG) 2 Unit(s) SubCutaneous three times a day before meals  lisinopril 20 milliGRAM(s) Oral daily  ticagrelor 60 milliGRAM(s) Oral every 12 hours    MEDICATIONS  (PRN):  acetaminophen     Tablet .. 650 milliGRAM(s) Oral every 6 hours PRN Temp greater or equal to 38C (100.4F), Mild Pain (1 - 3)  dextrose Oral Gel 15 Gram(s) Oral once PRN Blood Glucose LESS THAN 70 milliGRAM(s)/deciliter  melatonin 3 milliGRAM(s) Oral at bedtime PRN Insomnia      I&O's Summary      PHYSICAL EXAM:  Vital Signs Last 24 Hrs  T(C): 37.4 (08 Aug 2023 08:54), Max: 37.6 (07 Aug 2023 23:14)  T(F): 99.4 (08 Aug 2023 08:54), Max: 99.6 (07 Aug 2023 23:14)  HR: 78 (08 Aug 2023 08:54) (78 - 102)  BP: 137/73 (08 Aug 2023 08:54) (93/57 - 137/73)  BP(mean): --  RR: 18 (08 Aug 2023 08:54) (18 - 18)  SpO2: 99% (08 Aug 2023 08:54) (95% - 99%)    Parameters below as of 08 Aug 2023 08:54  Patient On (Oxygen Delivery Method): room air    CONSTITUTIONAL: NAD, well-developed  EYES: PERRLA; conjunctiva and sclera clear  ENMT: Moist oral mucosa, no pharyngeal injection or exudates  RESPIRATORY: Normal respiratory effort; lungs are clear to auscultation bilaterally  CARDIOVASCULAR: Regular rate and rhythm, normal S1 and S2, no murmur; No lower extremity edema; Peripheral pulses are 2+ bilaterally  ABDOMEN: Nontender to palpation, normoactive bowel sounds, no rebound/guarding  MUSCULOSKELETAL:  no clubbing or cyanosis of digits; no joint swelling or tenderness to palpation  PSYCH: A+O to person, place, and time; affect appropriate  NEUROLOGY: CN 2-12 are intact and symmetric; no gross sensory deficits   SKIN: No rashes; no palpable lesions, LLE foot with mild warmth, site wrapped with gauze    LABS:                        10.2   9.34  )-----------( 308      ( 08 Aug 2023 07:12 )             30.9     08-08    134<L>  |  99  |  27<H>  ----------------------------<  188<H>  3.6   |  23  |  1.75<H>    Ca    8.8      08 Aug 2023 07:12      PT/INR - ( 08 Aug 2023 07:12 )   PT: 13.2 sec;   INR: 1.21 ratio         PTT - ( 08 Aug 2023 07:12 )  PTT:28.7 sec      Urinalysis Basic - ( 08 Aug 2023 07:12 )    Color: x / Appearance: x / SG: x / pH: x  Gluc: 188 mg/dL / Ketone: x  / Bili: x / Urobili: x   Blood: x / Protein: x / Nitrite: x   Leuk Esterase: x / RBC: x / WBC x   Sq Epi: x / Non Sq Epi: x / Bacteria: x

## 2023-08-08 NOTE — DISCHARGE NOTE PROVIDER - NSDCFUADDAPPT_GEN_ALL_CORE_FT
Podiatry Discharge Instructions:  Follow up: Please follow up with Dr. Urbina within 1 week of discharge from the hospital, please call [phone] for appointment and discuss that you recently were seen in the hospital.  Wound Care: Please leave your dressing clean dry intact until your follow up appointment  Weight bearing: Please weight bear as tolerated to the left heel in a surgical shoe.  Antibiotics: Please continue as instructed. APPTS ARE READY TO BE MADE: [x] YES    Best Family or Patient Contact (if needed):    Additional Information about above appointments (if needed):    1:   2:   3:     Other comments or requests:    Podiatry Discharge Instructions:  Follow up: Please follow up with Dr. Urbina within 1 week of discharge from the hospital, please call [phone] for appointment and discuss that you recently were seen in the hospital.  Wound Care: Please apply vac to left foot wound @ 125 mmHg 3x per week continuously  Weight bearing: Please weight bear as tolerated in a surgical shoe.  Antibiotics: Please continue as instructed.          APPTS ARE READY TO BE MADE: [x] YES    Best Family or Patient Contact (if needed):    Additional Information about above appointments (if needed):    1:   2:   3:     Other comments or requests:    Podiatry Discharge Instructions:  Follow up: Please follow up with Dr. Urbina within 1 week of discharge from the hospital, please call [phone] for appointment and discuss that you recently were seen in the hospital.  Wound Care: Please apply vac to left foot wound @ 125 mmHg 3x per week continuously  Weight bearing: Please weight bear as tolerated in a surgical shoe.  Antibiotics: Please continue as instructed.          APPTS ARE READY TO BE MADE: [x] YES    Best Family or Patient Contact (if needed):    Additional Information about above appointments (if needed):    1:   2:   3:     Other comments or requests:   Patient was scheduled with Dr. Sonia Mckeon on 10/5/23 at 10:30am at [address] [place] [address]. Will await a call back from office with sooner appointment as message was sent.  Patient was scheduled with Dr. Bharti Quispe on 9/27/23 at 9:15am at [address] [place] [address] in [place]. Will await a call back from office with sooner appointment as message was sent.  Patient was scheduled with Dr. Virginia Mcmullen on 9/7/23 at 9:15am at [address] [place] [address] in [place]. Will await a call back from office with sooner appointment as message was sent.  Patient was previously scheduled with Dr. Brian Urbina on 8/22/23 at 9:30am at [address] [place] [address].

## 2023-08-08 NOTE — DISCHARGE NOTE PROVIDER - HOSPITAL COURSE
HPI:  62M w/ hx of CAD s/p multiple TOM 2016, IDDM2, CKD, HTN, HLD p/w L sided foot wound and chills. Pt was visiting child and grand child in [place] ~1 week ago when his family noticed L foot lateral wound when he was walking around on a deck bare foot. After coming home, pt denies fevers but does endorse some chills. Denies pain in foot but also endorses general lack of sensation in feet. Went to visit his podiatrist today who then told him to go to the ER.    In ER: Given IV Cefepime, Vancomycin, Clindamycin, LR 1L, Tylenol 1gm IVPB (03 Aug 2023 23:32)    Hospital Course: Patient was admitted for further medical management of left sided diabetic foot wound and sepsis. MRI noted with osteomyelitis within the lateral aspect of the base of the fifth metatarsal with adjacent soft tissue ulcer. Infectious disease was consulted - s/p bone biopsy. Wound cx showing ESBL. s/p OR with podiatry with I&D and bone bx 8/8, pus expressed no graft placed given c/f residual infection. As per ID - plan for PICC and 6 weeks IV antibiotics with ertapenem 1g daily until 9/13/2023. NIDHI/PVR show infragenicular disease. Patient underwent angiogram 8/10 with angioplasty popliteal and AT. Cardiology was consulted on this admission for operative clearance given Chronic HFrEF. Repeat TTE 8/4 showing EF 49% and multiple segmental abnormalities. Patient well compensated-  euvolemic during hospital stay. Hospital course was complicated by R arm Phlebitis - treated with warm compresses, arm elevation. PT evaluated patient recommending no skilled PT needs. Discharge/Dispo/Med rec discussed with attending  ____. Patient medically cleared for discharge ____ with outpatient follow up     Important Medication Changes and Reason:  - ertapenem 1g daily until 9/13/2023 for OM    Active or Pending Issues Requiring Follow-up:  - Weekly cbc, cmp, esr, crp while on IV abx fax to [phone]  - follow up with podiatrist Dr. Urbina within 1 week of discharge     Advanced Directives:   [x] Full code  [ ] DNR  [ ] Hospice    Discharge Diagnoses:  Sepsis due to Osteomyelitis of ankle or foot, acute.          HPI:  62M w/ hx of CAD s/p multiple TOM 2016, IDDM2, CKD, HTN, HLD p/w L sided foot wound and chills. Pt was visiting child and grand child in [place] ~1 week ago when his family noticed L foot lateral wound when he was walking around on a deck bare foot. After coming home, pt denies fevers but does endorse some chills. Denies pain in foot but also endorses general lack of sensation in feet. Went to visit his podiatrist today who then told him to go to the ER.    In ER: Given IV Cefepime, Vancomycin, Clindamycin, LR 1L, Tylenol 1gm IVPB (03 Aug 2023 23:32)    Hospital Course: Patient was admitted for further medical management of left sided diabetic foot wound and sepsis. MRI noted with osteomyelitis within the lateral aspect of the base of the fifth metatarsal with adjacent soft tissue ulcer. Infectious disease was consulted - s/p bone biopsy. Wound cx showing ESBL. s/p OR with podiatry with I&D and bone bx 8/8, pus expressed no graft placed given c/f residual infection. As per ID - plan for PICC and 6 weeks IV antibiotics with ertapenem 1g daily until 9/13/2023. NIDHI/PVR show infragenicular disease. Patient underwent angiogram 8/10 with angioplasty popliteal and AT. Cardiology was consulted on this admission for operative clearance given Chronic HFrEF. Repeat TTE 8/4 showing EF 49% and multiple segmental abnormalities. Patient well compensated-  euvolemic during hospital stay. Hospital course was complicated by R arm Phlebitis - treated with warm compresses, arm elevation. PT evaluated patient recommending no skilled PT needs. Discharge/Dispo/Med rec discussed with attending  ____. Patient medically cleared for discharge ____ with outpatient follow up     Important Medication Changes and Reason:  ertapenem 1g daily until 9/13/2023 for OM    Active or Pending Issues Requiring Follow-up:  Weekly cbc, cmp, esr, crp while on IV abx fax to [phone]  follow up with podiatrist Dr. Urbina within 1 week of discharge     Advanced Directives:   [x] Full code  [ ] DNR  [ ] Hospice    Discharge Diagnoses:  Sepsis due to Osteomyelitis of ankle or foot, acute.   DCP with med rec discussed with Dr. Mckeon PT is cleared for DC home with home care Follow up with Dr Urbina HPI:  62M w/ hx of CAD s/p multiple TOM 2016, IDDM2, CKD, HTN, HLD p/w L sided foot wound and chills. Pt was visiting child and grand child in [place] ~1 week ago when his family noticed L foot lateral wound when he was walking around on a deck bare foot. After coming home, pt denies fevers but does endorse some chills. Denies pain in foot but also endorses general lack of sensation in feet. Went to visit his podiatrist today who then told him to go to the ER.    In ER: Given IV Cefepime, Vancomycin, Clindamycin, LR 1L, Tylenol 1gm IVPB (03 Aug 2023 23:32)    Hospital Course: Patient was admitted for further medical management of left sided diabetic foot wound and sepsis. MRI noted with osteomyelitis within the lateral aspect of the base of the fifth metatarsal with adjacent soft tissue ulcer. Infectious disease was consulted - s/p bone biopsy. Wound cx showing ESBL. s/p OR with podiatry with I&D and bone bx 8/8, pus expressed no graft placed given c/f residual infection. As per ID - plan for PICC and 6 weeks IV antibiotics with ertapenem 1g daily until 9/13/2023. NIDHI/PVR show infragenicular disease. Patient underwent angiogram 8/10 with angioplasty popliteal and AT. Cardiology was consulted on this admission for operative clearance given Chronic HFrEF. Repeat TTE 8/4 showing EF 49% and multiple segmental abnormalities. Patient well compensated-  euvolemic during hospital stay. Hospital course was complicated by R arm Phlebitis - treated with warm compresses, arm elevation. PT evaluated patient recommending no skilled PT needs. Discharge/Dispo/Med rec discussed with attending Dr. Mckeon  Patient medically cleared for discharge ____ with outpatient follow up Dr Urbina     Important Medication Changes and Reason:  ertapenem 1g daily until 9/13/2023 for OM    Active or Pending Issues Requiring Follow-up:  Weekly cbc, cmp, esr, crp while on IV abx fax to [phone]  follow up with podiatrist Dr. Urbina within 1 week of discharge       Discharge Diagnoses:  Sepsis due to Osteomyelitis of ankle or foot, acute.   DCP with med rec discussed with Dr. Mckeon PT is cleared for DC home with home care Follow up with Dr Urbina HPI:  62M w/ hx of CAD s/p multiple TOM 2016, IDDM2, CKD, HTN, HLD p/w L sided foot wound and chills. Pt was visiting child and grand child in [place] ~1 week ago when his family noticed L foot lateral wound when he was walking around on a deck bare foot. After coming home, pt denies fevers but does endorse some chills. Denies pain in foot but also endorses general lack of sensation in feet. Went to visit his podiatrist today who then told him to go to the ER.    In ER: Given IV Cefepime, Vancomycin, Clindamycin, LR 1L, Tylenol 1gm IVPB (03 Aug 2023 23:32)    Hospital Course: Patient was admitted for further medical management of left sided diabetic foot wound and sepsis. MRI noted with osteomyelitis within the lateral aspect of the base of the fifth metatarsal with adjacent soft tissue ulcer. Infectious disease was consulted - s/p bone biopsy. Wound cx showing ESBL. s/p OR with podiatry with I&D and bone bx 8/8, pus expressed no graft placed given c/f residual infection. As per ID - plan for PICC and 6 weeks IV antibiotics with ertapenem 1g daily until 9/13/2023. NIDHI/PVR show infragenicular disease. Patient underwent angiogram 8/10 with angioplasty popliteal and AT. Cardiology was consulted on this admission for operative clearance given Chronic HFrEF. Repeat TTE 8/4 showing EF 49% and multiple segmental abnormalities. Patient well compensated-  euvolemic during hospital stay. Hospital course was complicated by R arm Phlebitis - treated with warm compresses, arm elevation. PT evaluated patient recommending no skilled PT needs. Discharge/Dispo/Med rec discussed with attending Dr. Mckeon  Patient medically cleared for discharge with outpatient follow up Dr Urbina     Important Medication Changes and Reason:  ertapenem 1g daily until 9/13/2023 for OM    Active or Pending Issues Requiring Follow-up:  Weekly cbc, cmp, esr, crp while on IV abx fax to [phone]  follow up with podiatrist Dr. Urbina within 1 week of discharge       Discharge Diagnoses:  Sepsis due to Osteomyelitis of ankle or foot, acute.   DCP with med rec discussed with Dr. Mckeon PT is cleared for DC home with home care Follow up with Dr Urbina

## 2023-08-08 NOTE — DISCHARGE NOTE PROVIDER - NSDCCPCAREPLAN_GEN_ALL_CORE_FT
PRINCIPAL DISCHARGE DIAGNOSIS  Diagnosis: Osteomyelitis of ankle or foot, acute  Assessment and Plan of Treatment: s/p OR with podiatry for incision and drainage and bone biopsy on 8/8  You underwent angiogram with angioplasty on 8/10   You will continue ertapenem daily until 9/13/2023.   Please follow up with Dr. Urbina within 1 week of discharge from the hospital, please call [phone] for appointment and discuss that you recently were seen in the hospital.  Wound Care: Please leave your dressing clean dry intact until your follow up appointment  Weight bearing: Please weight bear as tolerated to the left heel in a surgical shoe.      SECONDARY DISCHARGE DIAGNOSES  Diagnosis: DM foot ulcer  Assessment and Plan of Treatment: Make sure you get your HgA1c checked every three months.  If you take oral diabetes medications, check your blood glucose at least two times a day.  If you take short-acting insulin, check your blood glucose before meals and at bedtime.  It's important not to skip any meals.  Keep a log of your blood glucose results and always take it with you to your doctor appointments.  Keep a list of your current medications including over the counter medications and bring this medication list with you to all your doctor appointments.  If you have not seen your ophthalmologist this year, call for appointment.  Check your feet daily for redness, sores, or openings.  Do not self treat.  If there is no improvement in two days, call your primary care physician for an appointment.  HgA1c this admission was 9.7

## 2023-08-08 NOTE — PRE-ANESTHESIA EVALUATION ADULT - NSRADCARDRESULTSFT_GEN_ALL_CORE
TTE 8/4/23:    CONCLUSIONS:      1. The left ventricular systolic function is mildly decreased with an ejection fraction of 49 % by Marroquin's method of disks. No evidence of a thrombus in the left ventricle.   2. Multiple segmental abnormalities exist. See findings.   3. Normal right ventricular cavity size, normal right ventricular wall thickness and normal right ventricular systolic function.   4. There is moderate (grade 2) left ventricular diastolic dysfunction.   5. No prior echocardiogram is available for comparison.   6. No significant valvular disease.    ________________________________________________________________________________________  FINDINGS:     Left Ventricle:  The left ventricular systolic function is mildly decreased with a calculated ejection fraction of 49 % by the Marroquin's biplane method of disks. There are regional wall motion abnormalities consistent with ischemic heart disease. There is moderate (grade 2) left ventricular diastolic dysfunction. There is no evidence of a thrombus in the left ventricle.  LV Wall Scoring: The mid and apical anterior septum, apical inferior segment,  and apex are akinetic. The basal anteroseptal segment, mid inferoseptal segment,  apical lateral segment, apical anterior segment, and mid inferior segment are  hypokinetic. All remaining scored segments are normal.     OLD LV WALL SCORING TEXT: The mid and apical anterior septum, apical inferior segment, and apex are akinetic. The basal anteroseptal segment, mid inferoseptal segment, apical lateral segment, apical anterior segment, and mid inferior segment are hypokinetic. All remaining scored segments are normal.     Right Ventricle:  Normal right ventricular cavity size, normal wall thickness and normal right ventricular systolic function. Tricuspid annular plane systolic excursion (TAPSE) is 1.9 cm (normal >=1.7 cm).     Left Atrium:  The left atrium is normal in size with an indexed volume of 34.08 ml/m².     Interatrial Septum:  The interatrial septum appears intact.     Aortic Valve:  The aortic valve appears trileaflet. There is fibrocalcific aortic valve sclerosis without stenosis. There is no evidence of aortic regurgitation.     Mitral Valve:  Structurally normal mitral valve with normal leaflet excursion. There is trace mitral regurgitation.     Tricuspid Valve:  Structurally normal tricuspid valve with normal leaflet excursion. There is trace tricuspid regurgitation.     Pulmonic Valve:  Structurally normal pulmonic valve with normal leaflet excursion. There is mild pulmonic regurgitation.     Pericardium:  The pericardium is thickened. There is a trace pericardial effusion noted adjacent to the anterior right ventricle measuring 0.80 cm.     Systemic Veins:  The inferior vena cava is normal in size measuring 1.60 cm in diameter, (normal <2.1cm) with normal inspiratory collapse (normal >50%) consistent with normal right atrial pressure (~3, range 0-5mmHg).  ____________________________________________________________________  Quantitative Data:  Left Ventricle Measurements: (Indexed to BSA)     IVSd (2D):   1.2 cm  LVPWd (2D):  1.3 cm  LVIDd (2D):  5.2 cm  LVIDs (2D):  3.3 cm  LV Mass:     249 g  113.0 g/m²  BiPlane LV EF%: 49 %

## 2023-08-08 NOTE — DISCHARGE NOTE PROVIDER - NSDCMRMEDTOKEN_GEN_ALL_CORE_FT
aspirin 81 mg oral delayed release capsule: 1 orally once a day  carvedilol 3.125 mg oral tablet: 1 orally 2 times a day  furosemide 40 mg oral tablet: 1 orally once a day  glipiZIDE 10 mg oral tablet, extended release: 1 orally once a day  insulin glargine 100 units/mL subcutaneous solution: 20 unit(s) subcutaneous once a day (at bedtime)  lisinopril 20 mg oral tablet: 1 orally once a day  metFORMIN 500 mg oral tablet, extended release: 1 orally once a day (at bedtime)  rosuvastatin 40 mg oral capsule: 1 orally once a day (at bedtime)  ticagrelor 90 mg oral tablet: 1 orally 2 times a day   aspirin 81 mg oral delayed release capsule: 1 orally once a day  carvedilol 3.125 mg oral tablet: 1 orally 2 times a day  ertapenem 1 g injection: 1 gram(s) intravenously once a day  furosemide 40 mg oral tablet: 1 orally once a day  glipiZIDE 10 mg oral tablet, extended release: 1 orally once a day  insulin glargine 100 units/mL subcutaneous solution: 20 unit(s) subcutaneous once a day (at bedtime)  lisinopril 20 mg oral tablet: 1 orally once a day  metFORMIN 500 mg oral tablet, extended release: 1 orally once a day (at bedtime)  rosuvastatin 40 mg oral capsule: 1 orally once a day (at bedtime)  ticagrelor 90 mg oral tablet: 1 orally 2 times a day  WEEKLY CBC, CMP, ESR, CRP, FAX -[phone] ATTENTION DR. AHTFIELD: WEEKLY CBC, CMP, ESR, CRP, FAX [phone] ATTENTION DR. HATFIELD MDD: 0   aspirin 81 mg oral delayed release capsule: 1 orally once a day  carvedilol 3.125 mg oral tablet: 1 orally 2 times a day  ertapenem 1 g injection: 1 gram(s) intravenously once a day  furosemide 40 mg oral tablet: 1 orally once a day  glipiZIDE 10 mg oral tablet, extended release: 1 orally once a day  insulin glargine 100 units/mL subcutaneous solution: 20 unit(s) subcutaneous once a day (at bedtime)  lisinopril 20 mg oral tablet: 1 orally once a day  metFORMIN 500 mg oral tablet, extended release: 1 orally once a day (at bedtime)  ticagrelor 90 mg oral tablet: 1 orally 2 times a day  WEEKLY CBC, CMP, ESR, CRP, FAX [phone] ATTENTION DR. HATFIELD: WEEKLY CBC, CMP, ESR, CRP, FAX [phone] ATTENTION DR. HATFIELD MDD: 0   aspirin 81 mg oral delayed release capsule: 1 orally once a day  atorvastatin 80 mg oral tablet: 1 tab(s) orally once a day (at bedtime)  carvedilol 3.125 mg oral tablet: 1 orally 2 times a day  ertapenem 1 g injection: 1 gram(s) intravenously once a day  glipiZIDE 10 mg oral tablet, extended release: 1 orally once a day  insulin glargine 100 units/mL subcutaneous solution: 20 unit(s) subcutaneous once a day (at bedtime)  lisinopril 20 mg oral tablet: 1 orally once a day  metFORMIN 500 mg oral tablet, extended release: 1 orally once a day (at bedtime)  ticagrelor 90 mg oral tablet: 1 orally 2 times a day

## 2023-08-08 NOTE — PROGRESS NOTE ADULT - PROBLEM SELECTOR PLAN 2
pt with L foot wound with MRI showing L 5th base OM   - currently on vanco, cefepime, flagyl - ID following   - Wound cx showing ESBL->ertapenem  - NIDHI/PVRs reviewed, vascular states that pt will benefit from LLE angiogram and possible revascularization   - will f/u podiatry regarding possible partial ray resection   - pt with METS>4, RCRI 3 (15% 30d mortality) - pt is intermediate risk for low risk procedure, no contraindication to proceed to OR for partial ray resection  - cardiology pre-op clearance appreciated    R Thrombophlebitis  -at previous PIV site  -Warm compresses, arm elevation

## 2023-08-08 NOTE — DISCHARGE NOTE PROVIDER - CARE PROVIDERS DIRECT ADDRESSES
,[email or web address]

## 2023-08-08 NOTE — DISCHARGE NOTE PROVIDER - PROVIDER TOKENS
PROVIDER:[TOKEN:[[id number]],FOLLOWUP:[1 week]],PROVIDER:[TOKEN:[[id number]],FOLLOWUP:[Routine]],PROVIDER:[TOKEN:[[id number]],FOLLOWUP:[1 week]],PROVIDER:[TOKEN:[[id number]],FOLLOWUP:[1 week]]

## 2023-08-09 ENCOUNTER — TRANSCRIPTION ENCOUNTER (OUTPATIENT)
Age: 63
End: 2023-08-09

## 2023-08-09 LAB
ALBUMIN SERPL ELPH-MCNC: 3.5 G/DL — SIGNIFICANT CHANGE UP (ref 3.3–5)
ALP SERPL-CCNC: 73 U/L — SIGNIFICANT CHANGE UP (ref 40–120)
ALT FLD-CCNC: 20 U/L — SIGNIFICANT CHANGE UP (ref 10–45)
ANION GAP SERPL CALC-SCNC: 18 MMOL/L — HIGH (ref 5–17)
AST SERPL-CCNC: 24 U/L — SIGNIFICANT CHANGE UP (ref 10–40)
BASOPHILS # BLD AUTO: 0.03 K/UL — SIGNIFICANT CHANGE UP (ref 0–0.2)
BASOPHILS NFR BLD AUTO: 0.3 % — SIGNIFICANT CHANGE UP (ref 0–2)
BILIRUB SERPL-MCNC: 0.4 MG/DL — SIGNIFICANT CHANGE UP (ref 0.2–1.2)
BUN SERPL-MCNC: 30 MG/DL — HIGH (ref 7–23)
CALCIUM SERPL-MCNC: 9 MG/DL — SIGNIFICANT CHANGE UP (ref 8.4–10.5)
CHLORIDE SERPL-SCNC: 96 MMOL/L — SIGNIFICANT CHANGE UP (ref 96–108)
CO2 SERPL-SCNC: 21 MMOL/L — LOW (ref 22–31)
CREAT SERPL-MCNC: 1.67 MG/DL — HIGH (ref 0.5–1.3)
CULTURE RESULTS: SIGNIFICANT CHANGE UP
CULTURE RESULTS: SIGNIFICANT CHANGE UP
EGFR: 46 ML/MIN/1.73M2 — LOW
EOSINOPHIL # BLD AUTO: 0 K/UL — SIGNIFICANT CHANGE UP (ref 0–0.5)
EOSINOPHIL NFR BLD AUTO: 0 % — SIGNIFICANT CHANGE UP (ref 0–6)
GLUCOSE BLDC GLUCOMTR-MCNC: 307 MG/DL — HIGH (ref 70–99)
GLUCOSE BLDC GLUCOMTR-MCNC: 311 MG/DL — HIGH (ref 70–99)
GLUCOSE BLDC GLUCOMTR-MCNC: 312 MG/DL — HIGH (ref 70–99)
GLUCOSE BLDC GLUCOMTR-MCNC: 339 MG/DL — HIGH (ref 70–99)
GLUCOSE SERPL-MCNC: 338 MG/DL — HIGH (ref 70–99)
GRAM STN FLD: SIGNIFICANT CHANGE UP
HCT VFR BLD CALC: 30.6 % — LOW (ref 39–50)
HGB BLD-MCNC: 10 G/DL — LOW (ref 13–17)
IMM GRANULOCYTES NFR BLD AUTO: 0.5 % — SIGNIFICANT CHANGE UP (ref 0–0.9)
LYMPHOCYTES # BLD AUTO: 0.92 K/UL — LOW (ref 1–3.3)
LYMPHOCYTES # BLD AUTO: 10.7 % — LOW (ref 13–44)
MCHC RBC-ENTMCNC: 28.1 PG — SIGNIFICANT CHANGE UP (ref 27–34)
MCHC RBC-ENTMCNC: 32.7 GM/DL — SIGNIFICANT CHANGE UP (ref 32–36)
MCV RBC AUTO: 86 FL — SIGNIFICANT CHANGE UP (ref 80–100)
MONOCYTES # BLD AUTO: 0.36 K/UL — SIGNIFICANT CHANGE UP (ref 0–0.9)
MONOCYTES NFR BLD AUTO: 4.2 % — SIGNIFICANT CHANGE UP (ref 2–14)
NEUTROPHILS # BLD AUTO: 7.27 K/UL — SIGNIFICANT CHANGE UP (ref 1.8–7.4)
NEUTROPHILS NFR BLD AUTO: 84.3 % — HIGH (ref 43–77)
NRBC # BLD: 0 /100 WBCS — SIGNIFICANT CHANGE UP (ref 0–0)
PLATELET # BLD AUTO: 326 K/UL — SIGNIFICANT CHANGE UP (ref 150–400)
POTASSIUM SERPL-MCNC: 4.8 MMOL/L — SIGNIFICANT CHANGE UP (ref 3.5–5.3)
POTASSIUM SERPL-SCNC: 4.8 MMOL/L — SIGNIFICANT CHANGE UP (ref 3.5–5.3)
PROT SERPL-MCNC: 6.9 G/DL — SIGNIFICANT CHANGE UP (ref 6–8.3)
RBC # BLD: 3.56 M/UL — LOW (ref 4.2–5.8)
RBC # FLD: 12.4 % — SIGNIFICANT CHANGE UP (ref 10.3–14.5)
SODIUM SERPL-SCNC: 135 MMOL/L — SIGNIFICANT CHANGE UP (ref 135–145)
SPECIMEN SOURCE: SIGNIFICANT CHANGE UP
WBC # BLD: 8.62 K/UL — SIGNIFICANT CHANGE UP (ref 3.8–10.5)
WBC # FLD AUTO: 8.62 K/UL — SIGNIFICANT CHANGE UP (ref 3.8–10.5)

## 2023-08-09 PROCEDURE — 99232 SBSQ HOSP IP/OBS MODERATE 35: CPT

## 2023-08-09 RX ADMIN — Medication 4: at 12:33

## 2023-08-09 RX ADMIN — INSULIN GLARGINE 12 UNIT(S): 100 INJECTION, SOLUTION SUBCUTANEOUS at 21:27

## 2023-08-09 RX ADMIN — TICAGRELOR 60 MILLIGRAM(S): 90 TABLET ORAL at 17:02

## 2023-08-09 RX ADMIN — ERTAPENEM SODIUM 120 MILLIGRAM(S): 1 INJECTION, POWDER, LYOPHILIZED, FOR SOLUTION INTRAMUSCULAR; INTRAVENOUS at 13:46

## 2023-08-09 RX ADMIN — Medication 2 UNIT(S): at 17:03

## 2023-08-09 RX ADMIN — Medication 2: at 21:27

## 2023-08-09 RX ADMIN — TICAGRELOR 60 MILLIGRAM(S): 90 TABLET ORAL at 05:10

## 2023-08-09 RX ADMIN — Medication 4: at 17:03

## 2023-08-09 RX ADMIN — Medication 2 UNIT(S): at 12:33

## 2023-08-09 RX ADMIN — Medication 81 MILLIGRAM(S): at 12:34

## 2023-08-09 RX ADMIN — Medication 4: at 08:07

## 2023-08-09 RX ADMIN — Medication 2 UNIT(S): at 08:07

## 2023-08-09 RX ADMIN — CHLORHEXIDINE GLUCONATE 1 APPLICATION(S): 213 SOLUTION TOPICAL at 12:36

## 2023-08-09 RX ADMIN — ATORVASTATIN CALCIUM 80 MILLIGRAM(S): 80 TABLET, FILM COATED ORAL at 21:27

## 2023-08-09 NOTE — PROGRESS NOTE ADULT - ASSESSMENT
62M w/ hx of CAD s/p multiple TOM 2016, IDDM2, CKD, HTN, HLD p/w L sided diabetic foot wound and sepsis, with MRI with OM.

## 2023-08-09 NOTE — PROGRESS NOTE ADULT - ASSESSMENT
62M presents with left foot wound to subq and BL foot bullae.  - Patient seen and evaluated.  - Afebrile, WBC 8.62  - 8/8 s/p left foot incision and drainage with bone biopsy: sutures intact, serous drainage, no pus, no malodor, no purulent drainage, tunneling from lateral to central midfoot, mild erythema.  - high concern for residual bone infection  - low concern for viability, adequate intra-op bleeding  - Vascular tentatively planning angio thursday 8/10, appreciated  - Podiatry plan: local wound care pending bone biopsy, 6 weeks IV abx pending ID recs.  - Seen with attending

## 2023-08-09 NOTE — PROGRESS NOTE ADULT - SUBJECTIVE AND OBJECTIVE BOX
[facility] Division of Hospital Medicine  Kevin Ivy  MS Teams      SUBJECTIVE / OVERNIGHT EVENTS:  s/p L foot I&D with bone biopsy yesterday  Pus expressed so no graft  no events overnight  denies f/chills/further drainage  denies pain at the site    ADDITIONAL REVIEW OF SYSTEMS:    MEDICATIONS  (STANDING):  aspirin enteric coated 81 milliGRAM(s) Oral daily  atorvastatin 80 milliGRAM(s) Oral at bedtime  carvedilol 3.125 milliGRAM(s) Oral every 12 hours  chlorhexidine 2% Cloths 1 Application(s) Topical daily  dextrose 5%. 1000 milliLiter(s) (100 mL/Hr) IV Continuous <Continuous>  dextrose 5%. 1000 milliLiter(s) (50 mL/Hr) IV Continuous <Continuous>  dextrose 50% Injectable 25 Gram(s) IV Push once  dextrose 50% Injectable 12.5 Gram(s) IV Push once  dextrose 50% Injectable 25 Gram(s) IV Push once  ertapenem  IVPB      ertapenem  IVPB 1000 milliGRAM(s) IV Intermittent every 24 hours  glucagon  Injectable 1 milliGRAM(s) IntraMuscular once  insulin glargine Injectable (LANTUS) 12 Unit(s) SubCutaneous at bedtime  insulin lispro (ADMELOG) corrective regimen sliding scale   SubCutaneous three times a day before meals  insulin lispro (ADMELOG) corrective regimen sliding scale   SubCutaneous at bedtime  insulin lispro Injectable (ADMELOG) 2 Unit(s) SubCutaneous three times a day before meals  lisinopril 20 milliGRAM(s) Oral daily  ticagrelor 60 milliGRAM(s) Oral every 12 hours    MEDICATIONS  (PRN):  acetaminophen     Tablet .. 650 milliGRAM(s) Oral every 6 hours PRN Temp greater or equal to 38C (100.4F), Mild Pain (1 - 3)  dextrose Oral Gel 15 Gram(s) Oral once PRN Blood Glucose LESS THAN 70 milliGRAM(s)/deciliter  HYDROmorphone  Injectable 0.5 milliGRAM(s) IV Push every 4 hours PRN Severe Pain (7 - 10)  melatonin 3 milliGRAM(s) Oral at bedtime PRN Insomnia  oxycodone    5 mG/acetaminophen 325 mG 1 Tablet(s) Oral every 4 hours PRN Moderate Pain (4 - 6)      I&O's Summary    09 Aug 2023 07:01  -  09 Aug 2023 11:46  --------------------------------------------------------  IN: 0 mL / OUT: 400 mL / NET: -400 mL        PHYSICAL EXAM:  Vital Signs Last 24 Hrs  T(C): 36.6 (09 Aug 2023 08:30), Max: 37.2 (08 Aug 2023 17:04)  T(F): 97.9 (09 Aug 2023 08:30), Max: 99 (08 Aug 2023 17:04)  HR: 83 (09 Aug 2023 08:30) (71 - 90)  BP: 101/63 (09 Aug 2023 08:30) (96/66 - 126/68)  BP(mean): 80 (08 Aug 2023 21:15) (72 - 81)  RR: 18 (09 Aug 2023 08:30) (14 - 18)  SpO2: 96% (09 Aug 2023 08:30) (95% - 100%)    Parameters below as of 09 Aug 2023 08:30  Patient On (Oxygen Delivery Method): room air      CONSTITUTIONAL: NAD, well-developed  EYES: PERRLA; conjunctiva and sclera clear  ENMT: Moist oral mucosa, no pharyngeal injection or exudates  RESPIRATORY: Normal respiratory effort; lungs are clear to auscultation bilaterally  CARDIOVASCULAR: Regular rate and rhythm, normal S1 and S2, no murmur; No lower extremity edema; Peripheral pulses are 2+ bilaterally  ABDOMEN: Nontender to palpation, normoactive bowel sounds, no rebound/guarding  MUSCULOSKELETAL:  no clubbing or cyanosis of digits; no joint swelling or tenderness to palpation  PSYCH: A+O to person, place, and time; affect appropriate  NEUROLOGY: CN 2-12 are intact and symmetric; no gross sensory deficits   SKIN: No rashes; no palpable lesions, LLE foot wrapped, bandage clean    LABS:                        10.0   8.62  )-----------( 326      ( 09 Aug 2023 07:17 )             30.6     08-09    135  |  96  |  30<H>  ----------------------------<  338<H>  4.8   |  21<L>  |  1.67<H>    Ca    9.0      09 Aug 2023 07:14    TPro  6.9  /  Alb  3.5  /  TBili  0.4  /  DBili  x   /  AST  24  /  ALT  20  /  AlkPhos  73  08-09    PT/INR - ( 08 Aug 2023 07:12 )   PT: 13.2 sec;   INR: 1.21 ratio         PTT - ( 08 Aug 2023 07:12 )  PTT:28.7 sec      Urinalysis Basic - ( 09 Aug 2023 07:14 )    Color: x / Appearance: x / SG: x / pH: x  Gluc: 338 mg/dL / Ketone: x  / Bili: x / Urobili: x   Blood: x / Protein: x / Nitrite: x   Leuk Esterase: x / RBC: x / WBC x   Sq Epi: x / Non Sq Epi: x / Bacteria: x        Culture - Tissue with Gram Stain (collected 08 Aug 2023 22:04)  Source: .Tissue Other  Gram Stain (09 Aug 2023 03:14):    No polymorphonuclear cells seen per low power field    No organisms seen per oil power field

## 2023-08-09 NOTE — PHYSICAL THERAPY INITIAL EVALUATION ADULT - GENERAL OBSERVATIONS, REHAB EVAL
Rec'd pt semisupine in bed, awake and alert. No c/o
Patient encountered edge of bed, NAD and agreeable to PT eval

## 2023-08-09 NOTE — PROGRESS NOTE ADULT - SUBJECTIVE AND OBJECTIVE BOX
Patient is a 62y old  Male who presents with a chief complaint of Diabetic foot infection (04 Aug 2023 12:15)    f/u wound    Interval History/ROS:  no fever. s/p left foot I+D with bone biopsy 8/8, pus noted and culture sent.  awaiting angiogram.   Remainder of ROS otherwise negative.    PAST MEDICAL & SURGICAL HISTORY:  Diabetes mellitus  CAD (coronary artery disease)  Stage 3 chronic kidney disease  Essential hypertension    Allergies  No Known Allergies    ANTIMICROBIALS:  cefepime   IVPB 2000 every 12 hours (8/3-8/6)  vancomycin  IVPB 1250 every 24 hours (8/3-8/6)  metroNIDAZOLE  IVPB (8/4-8/6)    active:  ertapenem  IVPB 1000 every 24 hours (8/6-)    MEDICATIONS  (STANDING):  aspirin enteric coated 81 daily  atorvastatin 80 at bedtime  carvedilol 3.125 every 12 hours  insulin glargine Injectable (LANTUS) 12 at bedtime  insulin lispro (ADMELOG) corrective regimen sliding scale  three times a day before meals  insulin lispro (ADMELOG) corrective regimen sliding scale  at bedtime  insulin lispro Injectable (ADMELOG) 2 three times a day before meals  lisinopril 20 daily  ticagrelor 60 every 12 hours    Vital Signs Last 24 Hrs  T(F): 97.9 (08-09-23 @ 08:30), Max: 99 (08-08-23 @ 17:04)  HR: 83 (08-09-23 @ 08:30)  BP: 101/63 (08-09-23 @ 08:30)  RR: 18 (08-09-23 @ 08:30)  SpO2: 96% (08-09-23 @ 08:30) (95% - 100%)    PHYSICAL EXAM:  Constitutional: non-toxic, no distress  HEAD/EYES: anicteric  ENT:  supple  Cardiovascular:   normal S1, S2  Respiratory:  clear BS bilaterally  GI:  soft, non-tender, normal bowel sounds  :  no santos  Musculoskeletal:  no synovitis  Neurologic: awake and alert, normal strength, b/l feet with neuropathy  Skin:  L foot with post-op dressing  Psychiatric:  awake, alert, appropriate mood                                     10.0   8.62  )-----------( 326      ( 09 Aug 2023 07:17 )             30.6 08-09    135  |  96  |  30  ----------------------------<  338  4.8   |  21  |  1.67  Ca    9.0      09 Aug 2023 07:14  TPro  6.9  /  Alb  3.5  /  TBili  0.4  /  DBili  x   /  AST  24  /  ALT  20  /  AlkPhos  73  08-09    Sedimentation Rate, Erythrocyte: 96 (08-03 @ 21:06)  C-Reactive Protein, Serum: 61 (08-03 @ 21:06)    MICROBIOLOGY:  Culture - Tissue with Gram Stain (collected 08-08-23 @ 22:04)  Source: .Tissue Other  Gram Stain (08-09-23 @ 03:14):    No polymorphonuclear cells seen per low power field    No organisms seen per oil power field    Culture - Abscess with Gram Stain (collected 08-04-23 @ 07:28)  Source: .Abscess left foot  Gram Stain (08-04-23 @ 16:04):    No polymorphonuclear leukocytes seen per low power field    Moderate Gram Positive Cocci in Clusters seen per oil power field  Preliminary Report (08-06-23 @ 12:44):    Few Escherichia coli ESBL    Rare Staphylococcus epidermidis "Susceptibilities not performed"    Moderate Bacteroides fragilis "Susceptibilities not performed"  Organism: Escherichia coli ESBL (08-06-23 @ 10:01)  Organism: Escherichia coli ESBL (08-06-23 @ 10:01)      Method Type: CORTES      -  Amikacin: S <=16      -  Amoxicillin/Clavulanic Acid: I 16/8      -  Ampicillin: R 16 These ampicillin results predict results for amoxicillin      -  Ampicillin/Sulbactam: R <=4/2 Enterobacter, Klebsiella aerogenes, Citrobacter, and Serratia may develop resistance during prolonged therapy (3-4 days)      -  Aztreonam: R >16      -  Cefazolin: R >16 Enterobacter, Klebsiella aerogenes, Citrobacter, and Serratia may develop resistance during prolonged therapy (3-4 days)      -  Cefepime: R 8      -  Ceftriaxone: R <=1 Enterobacter, Klebsiella aerogenes, Citrobacter, and Serratia may develop resistance during prolonged therapy      -  Ciprofloxacin: I 0.5      -  Ertapenem: S <=0.5      -  Gentamicin: S <=2      -  Imipenem: S <=1      -  Levofloxacin: S <=0.5      -  Meropenem: S <=1      -  Piperacillin/Tazobactam: R <=8      -  Tobramycin: R >8      -  Trimethoprim/Sulfamethoxazole: R >2/38    Culture - Urine (collected 08-03-23 @ 21:19)  Source: Clean Catch Clean Catch (Midstream)  Final Report (08-05-23 @ 06:44):    <10,000 CFU/mL Normal Urogenital Radha    Culture - Blood (collected 08-03-23 @ 21:02)  Source: .Blood Blood-Peripheral  Preliminary Report (08-07-23 @ 01:01):    No growth at 72 Hours    Culture - Blood (collected 08-03-23 @ 20:45)  Source: .Blood Blood-Peripheral  Preliminary Report (08-07-23 @ 01:01):    No growth at 72 Hours    RADIOLOGY:  imaging below personally reviewed and agree with findings    MR Foot w/wo IV Cont, Left (08.04.23 @ 23:50) >  IMPRESSION:  Osteomyelitis within the lateral aspect of the base of the fifth metatarsal with adjacent soft tissue ulcer.    Xray Foot AP + Lateral, Left (08.03.23 @ 21:26) >  IMPRESSION:  Small ulcer about the base of the left fifth metatarsal with a clustered foci of soft tissue gas, suggestive of infection. No definite radiographic evidence for osteomyelitis at the base of the fifth metatarsal; MRI would provide a more sensitive evaluation.    Xray Chest 1 View AP/PA (08.03.23 @ 21:26) >  IMPRESSION:  Clear lungs.

## 2023-08-09 NOTE — PROGRESS NOTE ADULT - PROBLEM SELECTOR PLAN 2
pt with L foot wound with MRI showing L 5th base OM   - Wound cx showing ESBL->ertapenem  - NIDHI/PVRs reviewed, vascular planning for angiogram 8/10  - s/p OR with podiatry with I&D and bone bx, pus expressed no graft placed given c/f residual infection  - f/u bone bx results  - Will discuss with ID duration of abx

## 2023-08-09 NOTE — PROGRESS NOTE ADULT - SUBJECTIVE AND OBJECTIVE BOX
Patient is a 62y old  Male who presents with a chief complaint of Diabetic foot infection (09 Aug 2023 11:45)       INTERVAL HPI/OVERNIGHT EVENTS:  Patient seen and evaluated at bedside.  Pt is resting comfortable in NAD. Denies N/V/F/C.    Allergies    No Known Allergies    Intolerances        Vital Signs Last 24 Hrs  T(C): 36.6 (09 Aug 2023 08:30), Max: 37.2 (08 Aug 2023 17:04)  T(F): 97.9 (09 Aug 2023 08:30), Max: 99 (08 Aug 2023 17:04)  HR: 83 (09 Aug 2023 08:30) (71 - 90)  BP: 101/63 (09 Aug 2023 08:30) (96/66 - 126/68)  BP(mean): 80 (08 Aug 2023 21:15) (72 - 81)  RR: 18 (09 Aug 2023 08:30) (14 - 18)  SpO2: 96% (09 Aug 2023 08:30) (95% - 100%)    Parameters below as of 09 Aug 2023 08:30  Patient On (Oxygen Delivery Method): room air        LABS:                        10.0   8.62  )-----------( 326      ( 09 Aug 2023 07:17 )             30.6     08-09    135  |  96  |  30<H>  ----------------------------<  338<H>  4.8   |  21<L>  |  1.67<H>    Ca    9.0      09 Aug 2023 07:14    TPro  6.9  /  Alb  3.5  /  TBili  0.4  /  DBili  x   /  AST  24  /  ALT  20  /  AlkPhos  73  08-09    PT/INR - ( 08 Aug 2023 07:12 )   PT: 13.2 sec;   INR: 1.21 ratio         PTT - ( 08 Aug 2023 07:12 )  PTT:28.7 sec  Urinalysis Basic - ( 09 Aug 2023 07:14 )    Color: x / Appearance: x / SG: x / pH: x  Gluc: 338 mg/dL / Ketone: x  / Bili: x / Urobili: x   Blood: x / Protein: x / Nitrite: x   Leuk Esterase: x / RBC: x / WBC x   Sq Epi: x / Non Sq Epi: x / Bacteria: x      CAPILLARY BLOOD GLUCOSE      POCT Blood Glucose.: 311 mg/dL (09 Aug 2023 07:47)  POCT Blood Glucose.: 167 mg/dL (08 Aug 2023 21:57)  POCT Blood Glucose.: 149 mg/dL (08 Aug 2023 20:12)  POCT Blood Glucose.: 150 mg/dL (08 Aug 2023 16:30)      Lower Extremity Physical Exam:  Vascular: DP/PT 0/4 B/L, CFT <3sec x 10, Temp gradient_ B/L  Neurology: Epicritic sensation diminished to level of digits, B/L  Musculoskeletal/Ortho: Unremarkable.  Skin: 8/8 s/p left foot incision and drainage with bone biopsy: sutures intact, serous drainage, no pus, no malodor, no purulent drainage, tunneling from lateral to central midfoot, mild erythema.    RADIOLOGY & ADDITIONAL TESTS:

## 2023-08-09 NOTE — PROGRESS NOTE ADULT - SUBJECTIVE AND OBJECTIVE BOX
Patient is a 62y old  Male who presents with a chief complaint of Diabetic foot infection (08 Aug 2023 19:34)      HPI:  62M w/ hx of CAD s/p multiple TOM 2016, IDDM2, CKD, HTN, HLD p/w L sided foot wound and chills. Pt was visiting child and grand child in [place] ~1 week ago when his family noticed L foot lateral wound when he was walking around on a deck bare foot. After coming home, pt denies fevers but does endorse some chills. Denies pain in foot but also endorses general lack of sensation in feet. Went to visit his podiatrist today who then told him to go to the ER.    In ER: Given IV Cefepime, Vancomycin, Clindamycin, LR 1L, Tylenol 1gm IVPB (03 Aug 2023 23:32)      T(C): 36.5 (08-09-23 @ 05:09), Max: 37.4 (08-08-23 @ 08:54)  HR: 78 (08-09-23 @ 05:09) (71 - 90)  BP: 98/63 (08-09-23 @ 05:09) (96/66 - 137/73)  RR: 16 (08-09-23 @ 05:09) (14 - 18)  SpO2: 95% (08-09-23 @ 05:09) (95% - 100%)  Wt(kg): --    PHYSICAL EXAM:  General: NAD, Lying in bed comfortably  Neuro: Alert and answering questions appropriately   HEENT: Grossly normal, EOMI  Cardio: Regular rate  Resp: Good effort, no signs of respiratory distress  GI/Abd: Soft, NT/ND, no rebound/guarding, no masses palpated  Vascular: All 4 extremities warm, palpable R DP, + R PT, L DP, L PT signals  - L lateral foot with open wound and eschar, L lateral dorsal erythema within drawn line, nontender, no drainage, b/l dorsal foot bullae  Musculoskeletal: All 4 extremities moving spontaneously, no limitations      LABS:                        10.0   8.62  )-----------( 326      ( 09 Aug 2023 07:17 )             30.6     08-08    134<L>  |  99  |  27<H>  ----------------------------<  188<H>  3.6   |  23  |  1.75<H>    Ca    8.8      08 Aug 2023 07:12      PT/INR - ( 08 Aug 2023 07:12 )   PT: 13.2 sec;   INR: 1.21 ratio         PTT - ( 08 Aug 2023 07:12 )  PTT:28.7 sec  Urinalysis Basic - ( 08 Aug 2023 07:12 )    Color: x / Appearance: x / SG: x / pH: x  Gluc: 188 mg/dL / Ketone: x  / Bili: x / Urobili: x   Blood: x / Protein: x / Nitrite: x   Leuk Esterase: x / RBC: x / WBC x   Sq Epi: x / Non Sq Epi: x / Bacteria: x        CAPILLARY BLOOD GLUCOSE      POCT Blood Glucose.: 311 mg/dL (09 Aug 2023 07:47)  POCT Blood Glucose.: 167 mg/dL (08 Aug 2023 21:57)  POCT Blood Glucose.: 149 mg/dL (08 Aug 2023 20:12)  POCT Blood Glucose.: 150 mg/dL (08 Aug 2023 16:30)  POCT Blood Glucose.: 217 mg/dL (08 Aug 2023 12:10)  POCT Blood Glucose.: 233 mg/dL (08 Aug 2023 08:13)      CAPILLARY BLOOD GLUCOSE      POCT Blood Glucose.: 311 mg/dL (09 Aug 2023 07:47)  POCT Blood Glucose.: 167 mg/dL (08 Aug 2023 21:57)  POCT Blood Glucose.: 149 mg/dL (08 Aug 2023 20:12)  POCT Blood Glucose.: 150 mg/dL (08 Aug 2023 16:30)  POCT Blood Glucose.: 217 mg/dL (08 Aug 2023 12:10)  POCT Blood Glucose.: 233 mg/dL (08 Aug 2023 08:13)    CAPILLARY BLOOD GLUCOSE      POCT Blood Glucose.: 311 mg/dL (09 Aug 2023 07:47)            RECENT CULTURES:                MEDS  acetaminophen     Tablet .. 650 milliGRAM(s) Oral every 6 hours PRN  aspirin enteric coated 81 milliGRAM(s) Oral daily  atorvastatin 80 milliGRAM(s) Oral at bedtime  carvedilol 3.125 milliGRAM(s) Oral every 12 hours  chlorhexidine 2% Cloths 1 Application(s) Topical daily  dextrose 5%. 1000 milliLiter(s) IV Continuous <Continuous>  dextrose 5%. 1000 milliLiter(s) IV Continuous <Continuous>  dextrose 50% Injectable 25 Gram(s) IV Push once  dextrose 50% Injectable 25 Gram(s) IV Push once  dextrose 50% Injectable 12.5 Gram(s) IV Push once  dextrose Oral Gel 15 Gram(s) Oral once PRN  ertapenem  IVPB      ertapenem  IVPB 1000 milliGRAM(s) IV Intermittent every 24 hours  glucagon  Injectable 1 milliGRAM(s) IntraMuscular once  HYDROmorphone  Injectable 0.5 milliGRAM(s) IV Push every 4 hours PRN  insulin glargine Injectable (LANTUS) 12 Unit(s) SubCutaneous at bedtime  insulin lispro (ADMELOG) corrective regimen sliding scale   SubCutaneous at bedtime  insulin lispro (ADMELOG) corrective regimen sliding scale   SubCutaneous three times a day before meals  insulin lispro Injectable (ADMELOG) 2 Unit(s) SubCutaneous three times a day before meals  lisinopril 20 milliGRAM(s) Oral daily  melatonin 3 milliGRAM(s) Oral at bedtime PRN  oxycodone    5 mG/acetaminophen 325 mG 1 Tablet(s) Oral every 4 hours PRN  ticagrelor 60 milliGRAM(s) Oral every 12 hours

## 2023-08-09 NOTE — PROGRESS NOTE ADULT - ASSESSMENT
62M w/ hx of CAD s/p multiple TOM 2016, IDDM2, CKD, HTN, HLD p/w L sided foot wound and chills. MRI w/ findings of OM within lateral aspect of base of 5th metatarsal. Podiatry planning possible intervention, vascular surgery consulted. ABIs done, L 1.07, R 0.75.    PLAN:   - Plan for angiogram tomorrow  - Please document medical and cardiac clearance and risk stratification - done  - Continue ASA, statin  - Continue abx per ID recs  - Please make patient NPO after midnight, 4:00 AM labs (CBC, BMP, Mg, K, INR/PT/PTT, and two active type and screen)  - Care per primary team    Vascular Surgery  [phone]

## 2023-08-09 NOTE — PHYSICAL THERAPY INITIAL EVALUATION ADULT - PERTINENT HX OF CURRENT PROBLEM, REHAB EVAL
Pt is a 62M adm to [facility] on 8/3/23 w/ hx of CAD s/p multiple TOM 2016, IDDM2, CKD, HTN, HLD p/w L sided foot wound and chills. Pt was visiting child and grand child in [place] ~1 week ago when his family noticed L foot lateral wound when he was walking around on a deck bare foot. After coming home, pt denies fevers but does endorse some chills. Denies pain in foot but also endorses general lack of sensation in feet. Went to visit his podiatrist today who then told him to go to the ER  HC: MRI L foot: Osteomyelitis within the lateral aspect of the base of the fifth metatarsal with adjacent soft tissue ulcer. Vascular surgery consult: NIDHI's done, L 1.07, R 0.75. No vascular surgery planned. 8/8 s/p Incision and drainage, foot, bursa by podiatry.
62M w/ hx of CAD s/p multiple TOM 2016, IDDM2, CKD, HTN, HLD p/w L sided foot wound and chills. Pt was visiting child and grand child in [place] ~1 week ago when his family noticed L foot lateral wound when he was walking around on a deck bare foot. After coming home, pt denies fevers but does endorse some chills. Denies pain in foot but also endorses general lack of sensation in feet.   XR: Small ulcer about the base of the left fifth metatarsal w/ a few foci   of gas, suggestive of underlying infection. MRI: OM of lateral aspect of base of 5th metatarsal

## 2023-08-09 NOTE — PHYSICAL THERAPY INITIAL EVALUATION ADULT - GAIT DEVIATIONS NOTED, PT EVAL
decreased iveth/decreased step length/decreased swing-to-stance ratio/decreased weight-shifting ability

## 2023-08-09 NOTE — PRE PROCEDURE NOTE - PRE PROCEDURE EVALUATION
Preop Diagnosis: Peripheral Vascular Disease   Planned Procedure: Left Lower Extremity Angiogram  Surgeon: Dr. Carpio     Vital Signs Last 24 Hrs  T(C): 36.6 (09 Aug 2023 08:30), Max: 37.2 (08 Aug 2023 17:04)  T(F): 97.9 (09 Aug 2023 08:30), Max: 99 (08 Aug 2023 17:04)  HR: 83 (09 Aug 2023 08:30) (71 - 90)  BP: 101/63 (09 Aug 2023 08:30) (96/66 - 126/68)  BP(mean): 80 (08 Aug 2023 21:15) (72 - 81)  RR: 18 (09 Aug 2023 08:30) (14 - 18)  SpO2: 96% (09 Aug 2023 08:30) (95% - 100%)    Parameters below as of 09 Aug 2023 08:30  Patient On (Oxygen Delivery Method): room air      Daily Height in cm: 177.8 (08 Aug 2023 17:42)    Daily Weight in k (09 Aug 2023 08:30)    Pertinent Labs:                        10.0   8.62  )-----------( 326      ( 09 Aug 2023 07:17 )             30.6     08-09    135  |  96  |  30<H>  ----------------------------<  338<H>  4.8   |  21<L>  |  1.67<H>    Ca    9.0      09 Aug 2023 07:14    TPro  6.9  /  Alb  3.5  /  TBili  0.4  /  DBili  x   /  AST  24  /  ALT  20  /  AlkPhos  73  08-09    PT/INR - ( 08 Aug 2023 07:12 )   PT: 13.2 sec;   INR: 1.21 ratio         PTT - ( 08 Aug 2023 07:12 )  PTT:28.7 sec  -----------------------------------------------------------------------------------------------------------------------------------  Type and Screen:  -----------------------------------------------------------------------------------------------------------------------------------  U/A:  -----------------------------------------------------------------------------------------------------------------------------------  uHCG:  -----------------------------------------------------------------------------------------------------------------------------------  CXR:  -----------------------------------------------------------------------------------------------------------------------------------  EKG:  -----------------------------------------------------------------------------------------------------------------------------------  Echo:  -----------------------------------------------------------------------------------------------------------------------------------    A/P: 62y Male HPI:  62M w/ hx of CAD s/p multiple TOM 2016, IDDM2, CKD, HTN, HLD p/w L sided foot wound and chills. Pt was visiting child and grand child in [place] ~1 week ago when his family noticed L foot lateral wound when he was walking around on a deck bare foot. After coming home, pt denies fevers but does endorse some chills. Denies pain in foot but also endorses general lack of sensation in feet. Went to visit his podiatrist today who then told him to go to the ER.    In ER: Given IV Cefepime, Vancomycin, Clindamycin, LR 1L, Tylenol 1gm IVPB (03 Aug 2023 23:32)      Plan:  - OR 08/10/23 for Left Lower Extremity Angiogram with Dr. Carpio  - NPO after midnight, except medications   - IVF while NPO  - Consent signed in chart  - Pre-op Labs at 4:00 am on /: CBC, BMP, Mag, Phos, PT/PTT/INR, T&S   - Cardiac clearance documented   - Medicine clearance documented   - Please DC Brilinta order       Surgery  [id number]

## 2023-08-09 NOTE — PROGRESS NOTE ADULT - ASSESSMENT
62M w/ hx of CAD s/p multiple TOM 2016, IDDM2, CKD, HTN, HLD admitted 8/3 c/o L sided foot wound and chills.  Patient has DM over 20 years and has neuropathy.  Was visiting [place] (family) and was wearing new shoes.  Developed a blister R top of his foot and wound left foot.  Here he had chills at home.  In the .3.  BC drawn.  Given clindamycin, vancomycin and zosyn.  Noted to have malodor.  XRAY (-).  MRI confirms OM    left foot infection with eschar and malodorous  - MRI with OM  - culture (+) esbl ecoli, bacteroides  - continue ertapenem D#7 antibiotics  - s/p debridement by podiatry  - f/u cultures  - awaiting angiogram    R Phlebitis  - warm compresses, arm elevation  - if fever, please obtain BC

## 2023-08-09 NOTE — PHYSICAL THERAPY INITIAL EVALUATION ADULT - DIAGNOSIS, PT EVAL
With integumentary impairment due to I&D of L foot. +mobility impairment due wt bearing restrictions and dec bal in standing

## 2023-08-10 LAB
ANION GAP SERPL CALC-SCNC: 14 MMOL/L — SIGNIFICANT CHANGE UP (ref 5–17)
APTT BLD: 25.6 SEC — SIGNIFICANT CHANGE UP (ref 24.5–35.6)
BLD GP AB SCN SERPL QL: NEGATIVE — SIGNIFICANT CHANGE UP
BUN SERPL-MCNC: 29 MG/DL — HIGH (ref 7–23)
CALCIUM SERPL-MCNC: 8.6 MG/DL — SIGNIFICANT CHANGE UP (ref 8.4–10.5)
CHLORIDE SERPL-SCNC: 99 MMOL/L — SIGNIFICANT CHANGE UP (ref 96–108)
CO2 SERPL-SCNC: 21 MMOL/L — LOW (ref 22–31)
CREAT SERPL-MCNC: 1.5 MG/DL — HIGH (ref 0.5–1.3)
EGFR: 52 ML/MIN/1.73M2 — LOW
GAS PNL BLDA: SIGNIFICANT CHANGE UP
GAS PNL BLDA: SIGNIFICANT CHANGE UP
GLUCOSE BLDC GLUCOMTR-MCNC: 205 MG/DL — HIGH (ref 70–99)
GLUCOSE BLDC GLUCOMTR-MCNC: 205 MG/DL — HIGH (ref 70–99)
GLUCOSE BLDC GLUCOMTR-MCNC: 226 MG/DL — HIGH (ref 70–99)
GLUCOSE BLDC GLUCOMTR-MCNC: 274 MG/DL — HIGH (ref 70–99)
GLUCOSE SERPL-MCNC: 299 MG/DL — HIGH (ref 70–99)
HCT VFR BLD CALC: 27.6 % — LOW (ref 39–50)
HGB BLD-MCNC: 9 G/DL — LOW (ref 13–17)
INR BLD: 1.09 RATIO — SIGNIFICANT CHANGE UP (ref 0.85–1.18)
MAGNESIUM SERPL-MCNC: 2.4 MG/DL — SIGNIFICANT CHANGE UP (ref 1.6–2.6)
MCHC RBC-ENTMCNC: 27.5 PG — SIGNIFICANT CHANGE UP (ref 27–34)
MCHC RBC-ENTMCNC: 32.6 GM/DL — SIGNIFICANT CHANGE UP (ref 32–36)
MCV RBC AUTO: 84.4 FL — SIGNIFICANT CHANGE UP (ref 80–100)
NRBC # BLD: 0 /100 WBCS — SIGNIFICANT CHANGE UP (ref 0–0)
PHOSPHATE SERPL-MCNC: 2.8 MG/DL — SIGNIFICANT CHANGE UP (ref 2.5–4.5)
PLATELET # BLD AUTO: 315 K/UL — SIGNIFICANT CHANGE UP (ref 150–400)
POTASSIUM SERPL-MCNC: 3.9 MMOL/L — SIGNIFICANT CHANGE UP (ref 3.5–5.3)
POTASSIUM SERPL-SCNC: 3.9 MMOL/L — SIGNIFICANT CHANGE UP (ref 3.5–5.3)
PROTHROM AB SERPL-ACNC: 12 SEC — SIGNIFICANT CHANGE UP (ref 9.5–13)
RBC # BLD: 3.27 M/UL — LOW (ref 4.2–5.8)
RBC # FLD: 12.3 % — SIGNIFICANT CHANGE UP (ref 10.3–14.5)
RH IG SCN BLD-IMP: POSITIVE — SIGNIFICANT CHANGE UP
SODIUM SERPL-SCNC: 134 MMOL/L — LOW (ref 135–145)
WBC # BLD: 9.13 K/UL — SIGNIFICANT CHANGE UP (ref 3.8–10.5)
WBC # FLD AUTO: 9.13 K/UL — SIGNIFICANT CHANGE UP (ref 3.8–10.5)

## 2023-08-10 PROCEDURE — 99233 SBSQ HOSP IP/OBS HIGH 50: CPT

## 2023-08-10 PROCEDURE — 99232 SBSQ HOSP IP/OBS MODERATE 35: CPT

## 2023-08-10 PROCEDURE — 75625 CONTRAST EXAM ABDOMINL AORTA: CPT | Mod: 26

## 2023-08-10 PROCEDURE — 37224: CPT | Mod: LT

## 2023-08-10 PROCEDURE — 75710 ARTERY X-RAYS ARM/LEG: CPT | Mod: 26

## 2023-08-10 PROCEDURE — 37228: CPT | Mod: LT

## 2023-08-10 PROCEDURE — 76937 US GUIDE VASCULAR ACCESS: CPT | Mod: 26

## 2023-08-10 DEVICE — CATH QUICK CROSS .014X135CM: Type: IMPLANTABLE DEVICE | Site: LEFT | Status: FUNCTIONAL

## 2023-08-10 DEVICE — IMPLANTABLE DEVICE: Type: IMPLANTABLE DEVICE | Site: LEFT | Status: FUNCTIONAL

## 2023-08-10 DEVICE — CATH OMNI FLSH 0.035IN 5FRX65: Type: IMPLANTABLE DEVICE | Site: LEFT | Status: FUNCTIONAL

## 2023-08-10 DEVICE — SHEATH INTRODUCER TERUMO PINNACLE PERIPHERAL 6FR X 10CM: Type: IMPLANTABLE DEVICE | Site: LEFT | Status: FUNCTIONAL

## 2023-08-10 DEVICE — GWIRE AMPLATZER .035 X 260CM: Type: IMPLANTABLE DEVICE | Site: LEFT | Status: FUNCTIONAL

## 2023-08-10 DEVICE — INTRODUCER SET MICROPUNCTURE ACCESS 21G X 7CM: Type: IMPLANTABLE DEVICE | Site: LEFT | Status: FUNCTIONAL

## 2023-08-10 DEVICE — WIRE GUIDE COMMAND ES 300CM: Type: IMPLANTABLE DEVICE | Site: LEFT | Status: FUNCTIONAL

## 2023-08-10 DEVICE — KIT A-LINE 1LUM 20G X 12CM SAFE KIT: Type: IMPLANTABLE DEVICE | Site: LEFT | Status: FUNCTIONAL

## 2023-08-10 DEVICE — GUIDEWIRE ADVANTAGE .014INX300CM: Type: IMPLANTABLE DEVICE | Site: LEFT | Status: FUNCTIONAL

## 2023-08-10 DEVICE — CATH ANGIO GLIDECATH ANGLE 5FR X 100CM: Type: IMPLANTABLE DEVICE | Site: LEFT | Status: FUNCTIONAL

## 2023-08-10 DEVICE — INTRODUCER CHK FLO 6FR X 45CM: Type: IMPLANTABLE DEVICE | Site: LEFT | Status: FUNCTIONAL

## 2023-08-10 DEVICE — GUIDEWIRE BENTSON 0.035" X 145CM: Type: IMPLANTABLE DEVICE | Site: LEFT | Status: FUNCTIONAL

## 2023-08-10 DEVICE — DEVICE CLOSURE 6F/7F MYNX GRIP MUST ORDER MIN OF 10 EA: Type: IMPLANTABLE DEVICE | Site: LEFT | Status: FUNCTIONAL

## 2023-08-10 DEVICE — GUIDEWIRE RADIFOCUS GLIDEWIRE STANDARD ANGLED TIP 0.035" X 260CM: Type: IMPLANTABLE DEVICE | Site: LEFT | Status: FUNCTIONAL

## 2023-08-10 DEVICE — CATH SUPPORT 2 CXI 2.3X150CM ANG TIP: Type: IMPLANTABLE DEVICE | Site: LEFT | Status: FUNCTIONAL

## 2023-08-10 RX ORDER — ONDANSETRON 8 MG/1
4 TABLET, FILM COATED ORAL ONCE
Refills: 0 | Status: DISCONTINUED | OUTPATIENT
Start: 2023-08-10 | End: 2023-08-10

## 2023-08-10 RX ORDER — INSULIN GLARGINE 100 [IU]/ML
20 INJECTION, SOLUTION SUBCUTANEOUS AT BEDTIME
Refills: 0 | Status: DISCONTINUED | OUTPATIENT
Start: 2023-08-10 | End: 2023-08-13

## 2023-08-10 RX ORDER — HYDROMORPHONE HYDROCHLORIDE 2 MG/ML
0.25 INJECTION INTRAMUSCULAR; INTRAVENOUS; SUBCUTANEOUS
Refills: 0 | Status: DISCONTINUED | OUTPATIENT
Start: 2023-08-10 | End: 2023-08-10

## 2023-08-10 RX ORDER — INSULIN LISPRO 100/ML
VIAL (ML) SUBCUTANEOUS EVERY 6 HOURS
Refills: 0 | Status: DISCONTINUED | OUTPATIENT
Start: 2023-08-10 | End: 2023-08-11

## 2023-08-10 RX ORDER — TICAGRELOR 90 MG/1
60 TABLET ORAL EVERY 12 HOURS
Refills: 0 | Status: DISCONTINUED | OUTPATIENT
Start: 2023-08-10 | End: 2023-08-13

## 2023-08-10 RX ADMIN — Medication 2 UNIT(S): at 17:20

## 2023-08-10 RX ADMIN — TICAGRELOR 60 MILLIGRAM(S): 90 TABLET ORAL at 13:10

## 2023-08-10 RX ADMIN — LISINOPRIL 20 MILLIGRAM(S): 2.5 TABLET ORAL at 05:05

## 2023-08-10 RX ADMIN — ERTAPENEM SODIUM 120 MILLIGRAM(S): 1 INJECTION, POWDER, LYOPHILIZED, FOR SOLUTION INTRAMUSCULAR; INTRAVENOUS at 13:11

## 2023-08-10 RX ADMIN — Medication 3: at 06:06

## 2023-08-10 RX ADMIN — Medication 2: at 17:20

## 2023-08-10 RX ADMIN — ATORVASTATIN CALCIUM 80 MILLIGRAM(S): 80 TABLET, FILM COATED ORAL at 21:54

## 2023-08-10 RX ADMIN — INSULIN GLARGINE 20 UNIT(S): 100 INJECTION, SOLUTION SUBCUTANEOUS at 21:54

## 2023-08-10 RX ADMIN — Medication 81 MILLIGRAM(S): at 13:10

## 2023-08-10 RX ADMIN — CARVEDILOL PHOSPHATE 3.12 MILLIGRAM(S): 80 CAPSULE, EXTENDED RELEASE ORAL at 05:05

## 2023-08-10 RX ADMIN — Medication 2: at 12:14

## 2023-08-10 NOTE — PRE-ANESTHESIA EVALUATION ADULT - NSANTHOSAYNRD_GEN_A_CORE
No. REYMUNDO screening performed.  STOP BANG Legend: 0-2 = LOW Risk; 3-4 = INTERMEDIATE Risk; 5-8 = HIGH Risk
No. REYMUNDO screening performed.  STOP BANG Legend: 0-2 = LOW Risk; 3-4 = INTERMEDIATE Risk; 5-8 = HIGH Risk

## 2023-08-10 NOTE — CHART NOTE - NSCHARTNOTEFT_GEN_A_CORE
VASCULAR POST-OPERATIVE NOTE    PROCEDURE: LLE angiogram, DCB popliteal/AT angioplasty    Subjective: Patient seen and examined. He is resting comfortably, NAD, pain is controlled. Denies chest pain , SOB.     Vital Signs Last 24 Hrs  T(C): 36.7 (10 Aug 2023 15:56), Max: 37 (10 Aug 2023 06:35)  T(F): 98 (10 Aug 2023 15:56), Max: 98.6 (10 Aug 2023 06:35)  HR: 76 (10 Aug 2023 15:56) (70 - 78)  BP: 109/63 (10 Aug 2023 15:56) (96/53 - 121/74)  BP(mean): 76 (10 Aug 2023 15:00) (69 - 80)  RR: 18 (10 Aug 2023 15:56) (14 - 18)  SpO2: 97% (10 Aug 2023 15:56) (96% - 100%)    Parameters below as of 10 Aug 2023 15:56  Patient On (Oxygen Delivery Method): room air      I&O's Detail    09 Aug 2023 07:01  -  10 Aug 2023 07:00  --------------------------------------------------------  IN:    Oral Fluid: 240 mL  Total IN: 240 mL    OUT:    Voided (mL): 650 mL  Total OUT: 650 mL    Total NET: -410 mL        ertapenem  IVPB   ertapenem  IVPB 1000  aspirin enteric coated 81  carvedilol 3.125  ertapenem  IVPB   ertapenem  IVPB 1000  lisinopril 20  ticagrelor 60    PAST MEDICAL & SURGICAL HISTORY:  Diabetes mellitus      CAD (coronary artery disease)      Stage 3 chronic kidney disease      Essential hypertension            Physical Exam:  General: NAD, resting comfortably in bed  Pulmonary: Nonlabored breathing, no respiratory distress  Abdominal: soft, NT/ND  Extremities: WWP, R groin soft dressing c/d/i, R DP/PT palpable, , LLE PT dopplerable, unable to assess PT given cast      LABS:                        9.0    9.13  )-----------( 315      ( 10 Aug 2023 04:54 )             27.6     08-10    134<L>  |  99  |  29<H>  ----------------------------<  299<H>  3.9   |  21<L>  |  1.50<H>    Ca    8.6      10 Aug 2023 04:54  Phos  2.8     08-10  Mg     2.4     08-10    TPro  6.9  /  Alb  3.5  /  TBili  0.4  /  DBili  x   /  AST  24  /  ALT  20  /  AlkPhos  73  08-09    PT/INR - ( 10 Aug 2023 04:55 )   PT: 12.0 sec;   INR: 1.09 ratio         PTT - ( 10 Aug 2023 04:55 )  PTT:25.6 sec  CAPILLARY BLOOD GLUCOSE      POCT Blood Glucose.: 205 mg/dL (10 Aug 2023 16:30)  POCT Blood Glucose.: 205 mg/dL (10 Aug 2023 12:07)  POCT Blood Glucose.: 274 mg/dL (10 Aug 2023 05:55)  POCT Blood Glucose.: 312 mg/dL (09 Aug 2023 21:17)  POCT Blood Glucose.: 339 mg/dL (09 Aug 2023 16:45)      Radiology and Additional Studies:    62M w/ hx of CAD s/p multiple TOM 2016, IDDM2, CKD, HTN, HLD p/w L sided foot wound and chills. MRI w/ findings of OM within lateral aspect of base of 5th metatarsal. Podiatry planning possible intervention, vascular surgery consulted. ABIs done, L 1.07, R 0.75. now 4 hours s/p LLE angiogram, DCB popliteal/AT angioplasty, recovering appropriately.    PLAN:   - care per primary team  - Continue ASA, statin  - Care per primary team    Vascular Surgery  [phone]

## 2023-08-10 NOTE — BRIEF OPERATIVE NOTE - OPERATION/FINDINGS
s/p left foot incision and drainage with bone biopsy  - 5cc of pus expressed  - no graft placed 2/2 pus expressed  - adequate intraop bleeding  - bone biopsy incision primarily closed with 3-0 nylon
Stenosis of the behind-knee popliteal artery, occlusive disease of the distal popliteal artery. Stenosis of the proximal anterior tibial artery, TPT, peroneal artery, and posterior tibial artery. Single vessel runoff from the AT to the foot with collaterals to the PT. Improved inflow to the tibial vessels and foot following revascularization with drug coated balloon angioplasty of the popliteal artery, angioplasty of the AT artery.

## 2023-08-10 NOTE — PRE-ANESTHESIA EVALUATION ADULT - NSANTHAIRWAYFT_ENT_ALL_CORE
NECK-FROM  FACIAL HAIR
FROM  Facial hair  Neck circumference >17cm  TM distance <2 finger breadths  Interincisor distance <2 finger breadths  Various teeth missing

## 2023-08-10 NOTE — PROGRESS NOTE ADULT - SUBJECTIVE AND OBJECTIVE BOX
Patient is a 62y old  Male who presents with a chief complaint of Diabetic foot infection (04 Aug 2023 12:15)    f/u wound    Interval History/ROS:  s/p left foot I+D with bone biopsy 8/8, pus noted and culture sent.  s/p angiogram and angioplasty.  no pain.   Remainder of ROS otherwise negative.    PAST MEDICAL & SURGICAL HISTORY:  Diabetes mellitus  CAD (coronary artery disease)  Stage 3 chronic kidney disease  Essential hypertension    Allergies  No Known Allergies    ANTIMICROBIALS:  cefepime   IVPB 2000 every 12 hours (8/3-8/6)  vancomycin  IVPB 1250 every 24 hours (8/3-8/6)  metroNIDAZOLE  IVPB (8/4-8/6)    active:  ertapenem  IVPB 1000 every 24 hours (8/6-)    MEDICATIONS  (STANDING):  aspirin enteric coated 81 daily  atorvastatin 80 at bedtime  carvedilol 3.125 every 12 hours  insulin glargine Injectable (LANTUS) 20 at bedtime  insulin lispro (ADMELOG) corrective regimen sliding scale  every 6 hours  insulin lispro Injectable (ADMELOG) 2 three times a day before meals  lisinopril 20 daily  ticagrelor 60 every 12 hours    Vital Signs Last 24 Hrs  T(F): 98 (08-10-23 @ 15:56), Max: 98.6 (08-10-23 @ 06:35)  HR: 76 (08-10-23 @ 15:56)  BP: 109/63 (08-10-23 @ 15:56)  RR: 18 (08-10-23 @ 15:56)  SpO2: 97% (08-10-23 @ 15:56) (96% - 100%)    PHYSICAL EXAM:  Constitutional: non-toxic, no distress  HEAD/EYES: anicteric  ENT:  supple  Cardiovascular:   normal S1, S2  Respiratory:  clear BS bilaterally  GI:  soft, non-tender, normal bowel sounds  :  no santos  Musculoskeletal:  no synovitis  Neurologic: awake and alert, normal strength, b/l feet with neuropathy  Skin:  L foot with post-op dressing  Psychiatric:  awake, alert, appropriate mood                                    9.0    9.13  )-----------( 315      ( 10 Aug 2023 04:54 )             27.6 08-10    134  |  99  |  29  ----------------------------<  299  3.9   |  21  |  1.50  Ca    8.6      10 Aug 2023 04:54Phos  2.8     08-10Mg     2.4     08-10  TPro  6.9  /  Alb  3.5  /  TBili  0.4  /  DBili  x   /  AST  24  /  ALT  20  /  AlkPhos  73  08-09    Sedimentation Rate, Erythrocyte: 96 (08-03 @ 21:06)  C-Reactive Protein, Serum: 61 (08-03 @ 21:06)    MICROBIOLOGY:  Culture - Tissue with Gram Stain (collected 08-08-23 @ 22:04)  Source: .Tissue Other  Gram Stain (08-09-23 @ 03:14):    No polymorphonuclear cells seen per low power field    No organisms seen per oil power field  Preliminary Report (08-10-23 @ 08:07):    No growth to date.    Culture - Abscess with Gram Stain (collected 08-04-23 @ 07:28)  Source: .Abscess left foot  Gram Stain (08-04-23 @ 16:04):    No polymorphonuclear leukocytes seen per low power field    Moderate Gram Positive Cocci in Clusters seen per oil power field  Preliminary Report (08-06-23 @ 12:44):    Few Escherichia coli ESBL    Rare Staphylococcus epidermidis "Susceptibilities not performed"    Moderate Bacteroides fragilis "Susceptibilities not performed"  Organism: Escherichia coli ESBL (08-06-23 @ 10:01)  Organism: Escherichia coli ESBL (08-06-23 @ 10:01)      Method Type: CORTES      -  Amikacin: S <=16      -  Amoxicillin/Clavulanic Acid: I 16/8      -  Ampicillin: R 16 These ampicillin results predict results for amoxicillin      -  Ampicillin/Sulbactam: R <=4/2 Enterobacter, Klebsiella aerogenes, Citrobacter, and Serratia may develop resistance during prolonged therapy (3-4 days)      -  Aztreonam: R >16      -  Cefazolin: R >16 Enterobacter, Klebsiella aerogenes, Citrobacter, and Serratia may develop resistance during prolonged therapy (3-4 days)      -  Cefepime: R 8      -  Ceftriaxone: R <=1 Enterobacter, Klebsiella aerogenes, Citrobacter, and Serratia may develop resistance during prolonged therapy      -  Ciprofloxacin: I 0.5      -  Ertapenem: S <=0.5      -  Gentamicin: S <=2      -  Imipenem: S <=1      -  Levofloxacin: S <=0.5      -  Meropenem: S <=1      -  Piperacillin/Tazobactam: R <=8      -  Tobramycin: R >8      -  Trimethoprim/Sulfamethoxazole: R >2/38    Culture - Urine (collected 08-03-23 @ 21:19)  Source: Clean Catch Clean Catch (Midstream)  Final Report (08-05-23 @ 06:44):    <10,000 CFU/mL Normal Urogenital Radha    Culture - Blood (collected 08-03-23 @ 21:02)  Source: .Blood Blood-Peripheral  Preliminary Report (08-07-23 @ 01:01):    No growth at 72 Hours    Culture - Blood (collected 08-03-23 @ 20:45)  Source: .Blood Blood-Peripheral  Preliminary Report (08-07-23 @ 01:01):    No growth at 72 Hours    RADIOLOGY:  imaging below personally reviewed and agree with findings    MR Foot w/wo IV Cont, Left (08.04.23 @ 23:50) >  IMPRESSION:  Osteomyelitis within the lateral aspect of the base of the fifth metatarsal with adjacent soft tissue ulcer.    Xray Foot AP + Lateral, Left (08.03.23 @ 21:26) >  IMPRESSION:  Small ulcer about the base of the left fifth metatarsal with a clustered foci of soft tissue gas, suggestive of infection. No definite radiographic evidence for osteomyelitis at the base of the fifth metatarsal; MRI would provide a more sensitive evaluation.    Xray Chest 1 View AP/PA (08.03.23 @ 21:26) >  IMPRESSION:  Clear lungs.

## 2023-08-10 NOTE — PROGRESS NOTE ADULT - SUBJECTIVE AND OBJECTIVE BOX
[facility] Division of Hospital Medicine  Kevin Ivy  MS Teams      SUBJECTIVE / OVERNIGHT EVENTS:  No events overnight  s/p angio this am with angioplasty  denies pain at the site/swelling    ADDITIONAL REVIEW OF SYSTEMS:    MEDICATIONS  (STANDING):  aspirin enteric coated 81 milliGRAM(s) Oral daily  atorvastatin 80 milliGRAM(s) Oral at bedtime  carvedilol 3.125 milliGRAM(s) Oral every 12 hours  chlorhexidine 2% Cloths 1 Application(s) Topical daily  dextrose 5%. 1000 milliLiter(s) (100 mL/Hr) IV Continuous <Continuous>  dextrose 5%. 1000 milliLiter(s) (50 mL/Hr) IV Continuous <Continuous>  dextrose 50% Injectable 25 Gram(s) IV Push once  dextrose 50% Injectable 12.5 Gram(s) IV Push once  dextrose 50% Injectable 25 Gram(s) IV Push once  ertapenem  IVPB      ertapenem  IVPB 1000 milliGRAM(s) IV Intermittent every 24 hours  glucagon  Injectable 1 milliGRAM(s) IntraMuscular once  insulin glargine Injectable (LANTUS) 20 Unit(s) SubCutaneous at bedtime  insulin lispro (ADMELOG) corrective regimen sliding scale   SubCutaneous every 6 hours  insulin lispro Injectable (ADMELOG) 2 Unit(s) SubCutaneous three times a day before meals  lisinopril 20 milliGRAM(s) Oral daily  ticagrelor 60 milliGRAM(s) Oral every 12 hours    MEDICATIONS  (PRN):  acetaminophen     Tablet .. 650 milliGRAM(s) Oral every 6 hours PRN Temp greater or equal to 38C (100.4F), Mild Pain (1 - 3)  dextrose Oral Gel 15 Gram(s) Oral once PRN Blood Glucose LESS THAN 70 milliGRAM(s)/deciliter  HYDROmorphone  Injectable 0.5 milliGRAM(s) IV Push every 4 hours PRN Severe Pain (7 - 10)  HYDROmorphone  Injectable 0.25 milliGRAM(s) IV Push every 10 minutes PRN Moderate Pain (4 - 6)  melatonin 3 milliGRAM(s) Oral at bedtime PRN Insomnia  ondansetron Injectable 4 milliGRAM(s) IV Push once PRN Nausea and/or Vomiting  oxycodone    5 mG/acetaminophen 325 mG 1 Tablet(s) Oral every 4 hours PRN Moderate Pain (4 - 6)      I&O's Summary    09 Aug 2023 07:01  -  10 Aug 2023 07:00  --------------------------------------------------------  IN: 240 mL / OUT: 650 mL / NET: -410 mL        PHYSICAL EXAM:  Vital Signs Last 24 Hrs  T(C): 36.1 (10 Aug 2023 11:22), Max: 37 (10 Aug 2023 06:35)  T(F): 97 (10 Aug 2023 11:22), Max: 98.6 (10 Aug 2023 06:35)  HR: 76 (10 Aug 2023 13:00) (70 - 82)  BP: 98/54 (10 Aug 2023 11:45) (98/54 - 121/74)  BP(mean): 70 (10 Aug 2023 11:45) (70 - 80)  RR: 15 (10 Aug 2023 13:00) (15 - 18)  SpO2: 96% (10 Aug 2023 13:00) (96% - 100%)    Parameters below as of 10 Aug 2023 11:25  Patient On (Oxygen Delivery Method): nasal cannula  O2 Flow (L/min): 2    CONSTITUTIONAL: NAD, well-developed  EYES: PERRLA; conjunctiva and sclera clear  ENMT: Moist oral mucosa, no pharyngeal injection or exudates  RESPIRATORY: Normal respiratory effort; lungs are clear to auscultation bilaterally  CARDIOVASCULAR: Regular rate and rhythm, normal S1 and S2, no murmur; No lower extremity edema; Peripheral pulses are 2+ bilaterally, Warm  ABDOMEN: Nontender to palpation, normoactive bowel sounds  MUSCULOSKELETAL:  no clubbing or cyanosis of digits; no joint swelling or tenderness to palpation  PSYCH: A+O to person, place, and time; affect appropriate  NEUROLOGY: CN 2-12 are intact and symmetric; no gross sensory deficits   SKIN: No rashes; no palpable lesions, LLE foot wrapped, Ace bandage    LABS:                        9.0    9.13  )-----------( 315      ( 10 Aug 2023 04:54 )             27.6     08-10    134<L>  |  99  |  29<H>  ----------------------------<  299<H>  3.9   |  21<L>  |  1.50<H>    Ca    8.6      10 Aug 2023 04:54  Phos  2.8     08-10  Mg     2.4     08-10    TPro  6.9  /  Alb  3.5  /  TBili  0.4  /  DBili  x   /  AST  24  /  ALT  20  /  AlkPhos  73  08-09    PT/INR - ( 10 Aug 2023 04:55 )   PT: 12.0 sec;   INR: 1.09 ratio         PTT - ( 10 Aug 2023 04:55 )  PTT:25.6 sec      Urinalysis Basic - ( 10 Aug 2023 04:54 )    Color: x / Appearance: x / SG: x / pH: x  Gluc: 299 mg/dL / Ketone: x  / Bili: x / Urobili: x   Blood: x / Protein: x / Nitrite: x   Leuk Esterase: x / RBC: x / WBC x   Sq Epi: x / Non Sq Epi: x / Bacteria: x        Culture - Tissue with Gram Stain (collected 08 Aug 2023 22:04)  Source: .Tissue Other  Gram Stain (09 Aug 2023 03:14):    No polymorphonuclear cells seen per low power field    No organisms seen per oil power field  Preliminary Report (10 Aug 2023 08:07):    No growth to date.

## 2023-08-10 NOTE — BRIEF OPERATIVE NOTE - NSICDXBRIEFPOSTOP_GEN_ALL_CORE_FT
POST-OP DIAGNOSIS:  Abscess of left foot 08-Aug-2023 19:25:41  Remedios Post  Acute osteomyelitis 08-Aug-2023 19:26:04  Remedios Post  
POST-OP DIAGNOSIS:  Acute osteomyelitis 08-Aug-2023 19:26:04  Remedios Post  Peripheral arterial disease 10-Aug-2023 11:56:02  Sanjana Rush

## 2023-08-10 NOTE — BRIEF OPERATIVE NOTE - COMMENTS
Right groin access closed with 6Fr Mynx, dressed with gauze and tegaderm. Must lie flat for 6 hours. Resume Brilinta, scheduled ertapenem at 14:00.
Pt is s/p left foot incision and drainage with bone biopsy  - high concern for residual bone infection  - low concern for viability, adequate intra-op bleeding  - podiatry plan: f/u bone biopsy, IV abx for 6 weeks pending ID recs.

## 2023-08-10 NOTE — PROGRESS NOTE ADULT - PROBLEM SELECTOR PLAN 2
pt with L foot wound with MRI showing L 5th base OM   - Wound cx showing ESBL->ertapenem  - NIDHI/PVRs reviewed, s/p angiogram 8/10 with angioplasty popliteal and AT  - s/p OR with podiatry with I&D and bone bx 8/8, pus expressed no graft placed given c/f residual infection  - f/u bone bx results  - Podiatry not planning further intervention, recommending wound vac likely tomorrow 8/11  - Will discuss with ID duration of abx

## 2023-08-10 NOTE — PRE-ANESTHESIA EVALUATION ADULT - NSPROPOSEDPROCEDFT_GEN_ALL_CORE
left leg angiogram possible angioplasty possible graft
Left foot wound debridement, bone biopsy with graft application

## 2023-08-10 NOTE — PRE-ANESTHESIA EVALUATION ADULT - MALLAMPATI CLASS
Class II - visualization of the soft palate, fauces, and uvula
Class IV (difficult) - the soft palate is not visible at all

## 2023-08-10 NOTE — PROGRESS NOTE ADULT - ASSESSMENT
62M w/ hx of CAD s/p multiple TOM 2016, IDDM2, CKD, HTN, HLD admitted 8/3 c/o L sided foot wound and chills.  Patient has DM over 20 years and has neuropathy.  Was visiting [place] (family) and was wearing new shoes.  Developed a blister R top of his foot and wound left foot.  Here he had chills at home.  In the .3.  BC drawn.  Given clindamycin, vancomycin and zosyn.  Noted to have malodor.  XRAY (-).  MRI confirms OM    left foot infection with eschar and malodorous  - MRI with OM  - s/p bone biopsy  - s/p angioplasty  - culture (+) esbl ecoli, bacteroides  - continue ertapenem D#8 antibiotics  - podiatry has no further plans for bone resection  - will plan for PICC and 6 weeks IV antibiotics  - ertapenem 1g daily until 9/13/2023  - weekly cbc, cmp, esr, crp fax to [phone]    R Phlebitis  - better  - continue warm compresses, arm elevation  - if fever, please obtain BC    I have discussed plan of care as detailed above with NP

## 2023-08-10 NOTE — BRIEF OPERATIVE NOTE - NSICDXBRIEFPROCEDURE_GEN_ALL_CORE_FT
PROCEDURES:  Angiogram, lower extremity, left 10-Aug-2023 11:55:07  Sanjana Rush  Angioplasty of popliteal artery 10-Aug-2023 11:55:22 drug coated Sanjana Rush  Angioplasty of anterior tibial artery 10-Aug-2023 11:55:35  Sanjana Rush  
PROCEDURES:  Incision and drainage, foot, bursa 08-Aug-2023 19:24:16  Remedios Post

## 2023-08-10 NOTE — BRIEF OPERATIVE NOTE - NSICDXBRIEFPREOP_GEN_ALL_CORE_FT
PRE-OP DIAGNOSIS:  Acute osteomyelitis 08-Aug-2023 19:24:44  Remedios Post  Abscess of left foot 08-Aug-2023 19:25:15  Remedios Post  
PRE-OP DIAGNOSIS:  Acute osteomyelitis 08-Aug-2023 19:24:44  Remedios Post  Peripheral arterial disease 10-Aug-2023 11:56:07  Sanjana Rush

## 2023-08-11 LAB
ANION GAP SERPL CALC-SCNC: 13 MMOL/L — SIGNIFICANT CHANGE UP (ref 5–17)
BUN SERPL-MCNC: 24 MG/DL — HIGH (ref 7–23)
CALCIUM SERPL-MCNC: 8.3 MG/DL — LOW (ref 8.4–10.5)
CHLORIDE SERPL-SCNC: 102 MMOL/L — SIGNIFICANT CHANGE UP (ref 96–108)
CO2 SERPL-SCNC: 22 MMOL/L — SIGNIFICANT CHANGE UP (ref 22–31)
CREAT SERPL-MCNC: 1.68 MG/DL — HIGH (ref 0.5–1.3)
CULTURE RESULTS: SIGNIFICANT CHANGE UP
EGFR: 45 ML/MIN/1.73M2 — LOW
GLUCOSE BLDC GLUCOMTR-MCNC: 211 MG/DL — HIGH (ref 70–99)
GLUCOSE BLDC GLUCOMTR-MCNC: 212 MG/DL — HIGH (ref 70–99)
GLUCOSE BLDC GLUCOMTR-MCNC: 228 MG/DL — HIGH (ref 70–99)
GLUCOSE BLDC GLUCOMTR-MCNC: 231 MG/DL — HIGH (ref 70–99)
GLUCOSE SERPL-MCNC: 209 MG/DL — HIGH (ref 70–99)
HCT VFR BLD CALC: 27.8 % — LOW (ref 39–50)
HGB BLD-MCNC: 9 G/DL — LOW (ref 13–17)
MAGNESIUM SERPL-MCNC: 2.4 MG/DL — SIGNIFICANT CHANGE UP (ref 1.6–2.6)
MCHC RBC-ENTMCNC: 28 PG — SIGNIFICANT CHANGE UP (ref 27–34)
MCHC RBC-ENTMCNC: 32.4 GM/DL — SIGNIFICANT CHANGE UP (ref 32–36)
MCV RBC AUTO: 86.6 FL — SIGNIFICANT CHANGE UP (ref 80–100)
NRBC # BLD: 0 /100 WBCS — SIGNIFICANT CHANGE UP (ref 0–0)
ORGANISM # SPEC MICROSCOPIC CNT: SIGNIFICANT CHANGE UP
ORGANISM # SPEC MICROSCOPIC CNT: SIGNIFICANT CHANGE UP
PHOSPHATE SERPL-MCNC: 3 MG/DL — SIGNIFICANT CHANGE UP (ref 2.5–4.5)
PLATELET # BLD AUTO: 309 K/UL — SIGNIFICANT CHANGE UP (ref 150–400)
POTASSIUM SERPL-MCNC: 4 MMOL/L — SIGNIFICANT CHANGE UP (ref 3.5–5.3)
POTASSIUM SERPL-SCNC: 4 MMOL/L — SIGNIFICANT CHANGE UP (ref 3.5–5.3)
RBC # BLD: 3.21 M/UL — LOW (ref 4.2–5.8)
RBC # FLD: 12.5 % — SIGNIFICANT CHANGE UP (ref 10.3–14.5)
SODIUM SERPL-SCNC: 137 MMOL/L — SIGNIFICANT CHANGE UP (ref 135–145)
SPECIMEN SOURCE: SIGNIFICANT CHANGE UP
WBC # BLD: 8.95 K/UL — SIGNIFICANT CHANGE UP (ref 3.8–10.5)
WBC # FLD AUTO: 8.95 K/UL — SIGNIFICANT CHANGE UP (ref 3.8–10.5)

## 2023-08-11 PROCEDURE — 99232 SBSQ HOSP IP/OBS MODERATE 35: CPT

## 2023-08-11 PROCEDURE — 71045 X-RAY EXAM CHEST 1 VIEW: CPT | Mod: 26

## 2023-08-11 RX ORDER — INSULIN LISPRO 100/ML
VIAL (ML) SUBCUTANEOUS AT BEDTIME
Refills: 0 | Status: DISCONTINUED | OUTPATIENT
Start: 2023-08-11 | End: 2023-08-13

## 2023-08-11 RX ORDER — ERTAPENEM SODIUM 1 G/1
1 INJECTION, POWDER, LYOPHILIZED, FOR SOLUTION INTRAMUSCULAR; INTRAVENOUS
Qty: 33 | Refills: 0
Start: 2023-08-11 | End: 2023-09-12

## 2023-08-11 RX ORDER — INSULIN LISPRO 100/ML
VIAL (ML) SUBCUTANEOUS
Refills: 0 | Status: DISCONTINUED | OUTPATIENT
Start: 2023-08-11 | End: 2023-08-13

## 2023-08-11 RX ADMIN — Medication 2 UNIT(S): at 08:07

## 2023-08-11 RX ADMIN — Medication 2 UNIT(S): at 17:15

## 2023-08-11 RX ADMIN — CARVEDILOL PHOSPHATE 3.12 MILLIGRAM(S): 80 CAPSULE, EXTENDED RELEASE ORAL at 05:18

## 2023-08-11 RX ADMIN — LISINOPRIL 20 MILLIGRAM(S): 2.5 TABLET ORAL at 05:18

## 2023-08-11 RX ADMIN — CARVEDILOL PHOSPHATE 3.12 MILLIGRAM(S): 80 CAPSULE, EXTENDED RELEASE ORAL at 17:16

## 2023-08-11 RX ADMIN — Medication 2: at 12:43

## 2023-08-11 RX ADMIN — Medication 81 MILLIGRAM(S): at 12:42

## 2023-08-11 RX ADMIN — Medication 2 UNIT(S): at 12:43

## 2023-08-11 RX ADMIN — INSULIN GLARGINE 20 UNIT(S): 100 INJECTION, SOLUTION SUBCUTANEOUS at 22:05

## 2023-08-11 RX ADMIN — ATORVASTATIN CALCIUM 80 MILLIGRAM(S): 80 TABLET, FILM COATED ORAL at 21:07

## 2023-08-11 RX ADMIN — Medication 2: at 08:07

## 2023-08-11 RX ADMIN — CHLORHEXIDINE GLUCONATE 1 APPLICATION(S): 213 SOLUTION TOPICAL at 12:45

## 2023-08-11 RX ADMIN — TICAGRELOR 60 MILLIGRAM(S): 90 TABLET ORAL at 17:16

## 2023-08-11 RX ADMIN — TICAGRELOR 60 MILLIGRAM(S): 90 TABLET ORAL at 06:52

## 2023-08-11 RX ADMIN — Medication 2: at 17:15

## 2023-08-11 RX ADMIN — ERTAPENEM SODIUM 120 MILLIGRAM(S): 1 INJECTION, POWDER, LYOPHILIZED, FOR SOLUTION INTRAMUSCULAR; INTRAVENOUS at 13:50

## 2023-08-11 NOTE — PROGRESS NOTE ADULT - SUBJECTIVE AND OBJECTIVE BOX
VASCULAR SURGERY Progress NOTE  --------------------------------------------------------------------------------------------  Patient is a 62y old  Male who presents with a chief complaint of Diabetic foot infection (11 Aug 2023 08:14)    Progress Note:  62M w/ hx of CAD s/p multiple TOM 2016, IDDM2, CKD, HTN, HLD p/w L sided foot wound and chills. Pt was visiting child and grand child in [place] ~1 week ago when his family noticed L foot lateral wound when he was walking around on a deck bare foot. After coming home, pt denies fevers but does endorse some chills. Denies pain in foot but also endorses general lack of sensation in feet. Went to visit his podiatrist today who then told him to go to the ER.    In ER: Given IV Cefepime, Vancomycin, Clindamycin, LR 1L, Tylenol 1gm IVPB (03 Aug 2023 23:32)      ROS: 10-system review is otherwise negative except HPI above.      PAST MEDICAL & SURGICAL HISTORY:  Diabetes mellitus      CAD (coronary artery disease)      Stage 3 chronic kidney disease      Essential hypertension        FAMILY HISTORY:      SOCIAL HISTORY:      ALLERGIES: No Known Allergies      HOME MEDICATIONS:     CURRENT MEDICATIONS  MEDICATIONS (STANDING): aspirin enteric coated 81 milliGRAM(s) Oral daily  atorvastatin 80 milliGRAM(s) Oral at bedtime  carvedilol 3.125 milliGRAM(s) Oral every 12 hours  dextrose 5%. 1000 milliLiter(s) IV Continuous <Continuous>  dextrose 5%. 1000 milliLiter(s) IV Continuous <Continuous>  dextrose 50% Injectable 25 Gram(s) IV Push once  dextrose 50% Injectable 12.5 Gram(s) IV Push once  dextrose 50% Injectable 25 Gram(s) IV Push once  ertapenem  IVPB      ertapenem  IVPB 1000 milliGRAM(s) IV Intermittent every 24 hours  glucagon  Injectable 1 milliGRAM(s) IntraMuscular once  insulin glargine Injectable (LANTUS) 20 Unit(s) SubCutaneous at bedtime  insulin lispro (ADMELOG) corrective regimen sliding scale   SubCutaneous three times a day before meals  insulin lispro (ADMELOG) corrective regimen sliding scale   SubCutaneous at bedtime  insulin lispro Injectable (ADMELOG) 2 Unit(s) SubCutaneous three times a day before meals  lisinopril 20 milliGRAM(s) Oral daily  ticagrelor 60 milliGRAM(s) Oral every 12 hours    MEDICATIONS (PRN):acetaminophen     Tablet .. 650 milliGRAM(s) Oral every 6 hours PRN Temp greater or equal to 38C (100.4F), Mild Pain (1 - 3)  dextrose Oral Gel 15 Gram(s) Oral once PRN Blood Glucose LESS THAN 70 milliGRAM(s)/deciliter  HYDROmorphone  Injectable 0.5 milliGRAM(s) IV Push every 4 hours PRN Severe Pain (7 - 10)  melatonin 3 milliGRAM(s) Oral at bedtime PRN Insomnia  oxycodone    5 mG/acetaminophen 325 mG 1 Tablet(s) Oral every 4 hours PRN Moderate Pain (4 - 6)    --------------------------------------------------------------------------------------------    Vitals:   T(C): 37.2 (08-11-23 @ 08:46), Max: 37.2 (08-11-23 @ 08:46)  HR: 92 (08-11-23 @ 08:46) (72 - 92)  BP: 134/66 (08-11-23 @ 08:46) (99/54 - 134/66)  RR: 18 (08-11-23 @ 08:46) (14 - 18)  SpO2: 93% (08-11-23 @ 08:46) (92% - 100%)  CAPILLARY BLOOD GLUCOSE      POCT Blood Glucose.: 212 mg/dL (11 Aug 2023 07:35)  POCT Blood Glucose.: 226 mg/dL (10 Aug 2023 21:06)  POCT Blood Glucose.: 205 mg/dL (10 Aug 2023 16:30)  POCT Blood Glucose.: 205 mg/dL (10 Aug 2023 12:07)    CAPILLARY BLOOD GLUCOSE      POCT Blood Glucose.: 212 mg/dL (11 Aug 2023 07:35)  POCT Blood Glucose.: 226 mg/dL (10 Aug 2023 21:06)  POCT Blood Glucose.: 205 mg/dL (10 Aug 2023 16:30)  POCT Blood Glucose.: 205 mg/dL (10 Aug 2023 12:07)      08-10 @ 07:01  -  08-11 @ 07:00  --------------------------------------------------------  IN:  Total IN: 0 mL    OUT:    Voided (mL): 550 mL  Total OUT: 550 mL    Total NET: -550 mL        Height (cm): 177.8 (08-10 @ 07:08)  Weight (kg): 103.7 (08-10 @ 07:08)  BMI (kg/m2): 32.8 (08-10 @ 07:08)  BSA (m2): 2.21 (08-10 @ 07:08)    PHYSICAL EXAM:   General: NAD, Lying in bed comfortably  Neuro: A+Ox3  HEENT: NC/AT, EOMI  Neck: Soft, supple  Cardio: RRR, nml S1/S2  Resp: Good effort, CTA b/l  Vascular: Rt. groin access site is c/d/i with NO swelling or hematoma. Palpable pulses PT/AT appreciated on Rt. LE. LLE foot is wrapped with kerlix, gauze, and ABDs.  --------------------------------------------------------------------------------------------    LABS  CBC (08-11 @ 07:06)                              9.0<L>                         8.95    )----------------(  309        --    % Neutrophils, --    % Lymphocytes, ANC: --                                  27.8<L>  CBC (08-10 @ 04:54)                              9.0<L>                         9.13    )----------------(  315        --    % Neutrophils, --    % Lymphocytes, ANC: --                                  27.6<L>    BMP (08-11 @ 07:06)             137     |  102     |  24<H> 		Ca++ --      Ca 8.3<L>             ---------------------------------( 209<H>		Mg 2.4                4.0     |  22      |  1.68<H>			Ph 3.0     BMP (08-10 @ 04:54)             134<L>  |  99      |  29<H> 		Ca++ --      Ca 8.6                ---------------------------------( 299<H>		Mg 2.4                3.9     |  21<L>   |  1.50<H>			Ph 2.8         Coags (08-10 @ 04:55)  aPTT 25.6 / INR 1.09 / PT 12.0      ABG (08-10 @ 10:54)     7.47<H> / 35 / 160<H> / 26 / 1.9 / 99.3<H>%     Lactate:    ABG (08-10 @ 09:09)     7.47<H> / 35 / 177<H> / 26 / 1.9 / 99.8<H>%     Lactate:        --------------------------------------------------------------------------------------------    MICROBIOLOGY  Urinalysis (08-11 @ 07:06):     Color:  / Appearance:  / SG:  / pH:  / Gluc: 209<H> / Ketones:  / Bili:  / Urobili:  / Protein : / Nitrites:  / Leuk.Est:  / RBC:  / WBC:  / Sq Epi:  / Non Sq Epi:  / Bacteria        -> .Tissue Other Culture (08-08 @ 22:04)       No polymorphonuclear cells seen per low power field  No organisms seen per oil power field    NG    No growth to date.    -> .Abscess left foot Culture (08-04 @ 07:28)       No polymorphonuclear leukocytes seen per low power field  Moderate Gram Positive Cocci in Clusters seen per oil power field    Escherichia coli ESBL    Few Escherichia coli ESBL  Rare Staphylococcus epidermidis "Susceptibilities not performed"  Moderate Bacteroides fragilis "Susceptibilities not performed"    -> Clean Catch Clean Catch (Midstream) Culture (08-03 @ 21:19)     NG    NG    <10,000 CFU/mL Normal Urogenital Radha    -> .Blood Blood-Peripheral Culture (08-03 @ 21:02)     NG    NG    No growth at 5 days    -> .Blood Blood-Peripheral Culture (08-03 @ 20:45)     NG    NG    No growth at 5 days      --------------------------------------------------------------------------------------------    IMAGING

## 2023-08-11 NOTE — PROGRESS NOTE ADULT - SUBJECTIVE AND OBJECTIVE BOX
[facility] Division of Hospital Medicine  Kevin Ivy  MS Teams      SUBJECTIVE / OVERNIGHT EVENTS:  s/p angio yesterday  no pain at the site, + pulses  denies f/chills/bleeding/drainage    ADDITIONAL REVIEW OF SYSTEMS:    MEDICATIONS  (STANDING):  aspirin enteric coated 81 milliGRAM(s) Oral daily  atorvastatin 80 milliGRAM(s) Oral at bedtime  carvedilol 3.125 milliGRAM(s) Oral every 12 hours  chlorhexidine 2% Cloths 1 Application(s) Topical daily  dextrose 5%. 1000 milliLiter(s) (100 mL/Hr) IV Continuous <Continuous>  dextrose 5%. 1000 milliLiter(s) (50 mL/Hr) IV Continuous <Continuous>  dextrose 50% Injectable 25 Gram(s) IV Push once  dextrose 50% Injectable 25 Gram(s) IV Push once  dextrose 50% Injectable 12.5 Gram(s) IV Push once  ertapenem  IVPB      ertapenem  IVPB 1000 milliGRAM(s) IV Intermittent every 24 hours  glucagon  Injectable 1 milliGRAM(s) IntraMuscular once  insulin glargine Injectable (LANTUS) 20 Unit(s) SubCutaneous at bedtime  insulin lispro (ADMELOG) corrective regimen sliding scale   SubCutaneous three times a day before meals  insulin lispro (ADMELOG) corrective regimen sliding scale   SubCutaneous at bedtime  insulin lispro Injectable (ADMELOG) 2 Unit(s) SubCutaneous three times a day before meals  lisinopril 20 milliGRAM(s) Oral daily  ticagrelor 60 milliGRAM(s) Oral every 12 hours    MEDICATIONS  (PRN):  acetaminophen     Tablet .. 650 milliGRAM(s) Oral every 6 hours PRN Temp greater or equal to 38C (100.4F), Mild Pain (1 - 3)  dextrose Oral Gel 15 Gram(s) Oral once PRN Blood Glucose LESS THAN 70 milliGRAM(s)/deciliter  HYDROmorphone  Injectable 0.5 milliGRAM(s) IV Push every 4 hours PRN Severe Pain (7 - 10)  melatonin 3 milliGRAM(s) Oral at bedtime PRN Insomnia  oxycodone    5 mG/acetaminophen 325 mG 1 Tablet(s) Oral every 4 hours PRN Moderate Pain (4 - 6)      I&O's Summary    10 Aug 2023 07:01  -  11 Aug 2023 07:00  --------------------------------------------------------  IN: 0 mL / OUT: 550 mL / NET: -550 mL    11 Aug 2023 07:01  -  11 Aug 2023 13:13  --------------------------------------------------------  IN: 240 mL / OUT: 0 mL / NET: 240 mL        PHYSICAL EXAM:  Vital Signs Last 24 Hrs  T(C): 37.2 (11 Aug 2023 08:46), Max: 37.2 (11 Aug 2023 08:46)  T(F): 99 (11 Aug 2023 08:46), Max: 99 (11 Aug 2023 08:46)  HR: 92 (11 Aug 2023 08:46) (72 - 92)  BP: 134/66 (11 Aug 2023 08:46) (102/63 - 134/66)  BP(mean): 76 (10 Aug 2023 15:00) (76 - 77)  RR: 18 (11 Aug 2023 08:46) (14 - 18)  SpO2: 93% (11 Aug 2023 08:46) (92% - 100%)    Parameters below as of 11 Aug 2023 08:46  Patient On (Oxygen Delivery Method): room air      CONSTITUTIONAL: NAD, well-developed  EYES: PERRLA; conjunctiva and sclera clear  ENMT: Moist oral mucosa, no pharyngeal injection or exudates  RESPIRATORY: Normal respiratory effort; lungs are clear to auscultation bilaterally  CARDIOVASCULAR: Regular rate and rhythm, normal S1 and S2, no murmur; No lower extremity edema; Peripheral pulses are 2+ bilaterally, Warm  ABDOMEN: Nontender to palpation, normoactive bowel sounds  MUSCULOSKELETAL:  no clubbing or cyanosis of digits; no joint swelling or tenderness to palpation  PSYCH: A+O to person, place, and time; affect appropriate  NEUROLOGY: CN 2-12 are intact and symmetric; no gross sensory deficits   SKIN: No rashes; no palpable lesions, LLE foot wrapped, Ace bandage    LABS:                        9.0    8.95  )-----------( 309      ( 11 Aug 2023 07:06 )             27.8     08-11    137  |  102  |  24<H>  ----------------------------<  209<H>  4.0   |  22  |  1.68<H>    Ca    8.3<L>      11 Aug 2023 07:06  Phos  3.0     08-11  Mg     2.4     08-11      PT/INR - ( 10 Aug 2023 04:55 )   PT: 12.0 sec;   INR: 1.09 ratio         PTT - ( 10 Aug 2023 04:55 )  PTT:25.6 sec      Urinalysis Basic - ( 11 Aug 2023 07:06 )    Color: x / Appearance: x / SG: x / pH: x  Gluc: 209 mg/dL / Ketone: x  / Bili: x / Urobili: x   Blood: x / Protein: x / Nitrite: x   Leuk Esterase: x / RBC: x / WBC x   Sq Epi: x / Non Sq Epi: x / Bacteria: x        Culture - Tissue with Gram Stain (collected 08 Aug 2023 22:04)  Source: .Tissue Other  Gram Stain (09 Aug 2023 03:14):    No polymorphonuclear cells seen per low power field    No organisms seen per oil power field  Preliminary Report (10 Aug 2023 08:07):    No growth to date.        RADIOLOGY & ADDITIONAL TESTS:  Results Reviewed:   Imaging Personally Reviewed:  Electrocardiogram Personally Reviewed:   [facility] Division of Hospital Medicine  Kevin Ivy  MS Teams      SUBJECTIVE / OVERNIGHT EVENTS:  s/p angio yesterday  no pain at the site, + pulses  denies f/chills/bleeding/drainage    ADDITIONAL REVIEW OF SYSTEMS:    MEDICATIONS  (STANDING):  aspirin enteric coated 81 milliGRAM(s) Oral daily  atorvastatin 80 milliGRAM(s) Oral at bedtime  carvedilol 3.125 milliGRAM(s) Oral every 12 hours  chlorhexidine 2% Cloths 1 Application(s) Topical daily  dextrose 5%. 1000 milliLiter(s) (100 mL/Hr) IV Continuous <Continuous>  dextrose 5%. 1000 milliLiter(s) (50 mL/Hr) IV Continuous <Continuous>  dextrose 50% Injectable 25 Gram(s) IV Push once  dextrose 50% Injectable 25 Gram(s) IV Push once  dextrose 50% Injectable 12.5 Gram(s) IV Push once  ertapenem  IVPB      ertapenem  IVPB 1000 milliGRAM(s) IV Intermittent every 24 hours  glucagon  Injectable 1 milliGRAM(s) IntraMuscular once  insulin glargine Injectable (LANTUS) 20 Unit(s) SubCutaneous at bedtime  insulin lispro (ADMELOG) corrective regimen sliding scale   SubCutaneous three times a day before meals  insulin lispro (ADMELOG) corrective regimen sliding scale   SubCutaneous at bedtime  insulin lispro Injectable (ADMELOG) 2 Unit(s) SubCutaneous three times a day before meals  lisinopril 20 milliGRAM(s) Oral daily  ticagrelor 60 milliGRAM(s) Oral every 12 hours    MEDICATIONS  (PRN):  acetaminophen     Tablet .. 650 milliGRAM(s) Oral every 6 hours PRN Temp greater or equal to 38C (100.4F), Mild Pain (1 - 3)  dextrose Oral Gel 15 Gram(s) Oral once PRN Blood Glucose LESS THAN 70 milliGRAM(s)/deciliter  HYDROmorphone  Injectable 0.5 milliGRAM(s) IV Push every 4 hours PRN Severe Pain (7 - 10)  melatonin 3 milliGRAM(s) Oral at bedtime PRN Insomnia  oxycodone    5 mG/acetaminophen 325 mG 1 Tablet(s) Oral every 4 hours PRN Moderate Pain (4 - 6)      I&O's Summary    10 Aug 2023 07:01  -  11 Aug 2023 07:00  --------------------------------------------------------  IN: 0 mL / OUT: 550 mL / NET: -550 mL    11 Aug 2023 07:01  -  11 Aug 2023 13:13  --------------------------------------------------------  IN: 240 mL / OUT: 0 mL / NET: 240 mL        PHYSICAL EXAM:  Vital Signs Last 24 Hrs  T(C): 37.2 (11 Aug 2023 08:46), Max: 37.2 (11 Aug 2023 08:46)  T(F): 99 (11 Aug 2023 08:46), Max: 99 (11 Aug 2023 08:46)  HR: 92 (11 Aug 2023 08:46) (72 - 92)  BP: 134/66 (11 Aug 2023 08:46) (102/63 - 134/66)  BP(mean): 76 (10 Aug 2023 15:00) (76 - 77)  RR: 18 (11 Aug 2023 08:46) (14 - 18)  SpO2: 93% (11 Aug 2023 08:46) (92% - 100%)    Parameters below as of 11 Aug 2023 08:46  Patient On (Oxygen Delivery Method): room air      CONSTITUTIONAL: NAD, well-developed  EYES: PERRLA; conjunctiva and sclera clear  ENMT: Moist oral mucosa, no pharyngeal injection or exudates  RESPIRATORY: Normal respiratory effort; lungs are clear to auscultation bilaterally  CARDIOVASCULAR: Regular rate and rhythm, normal S1 and S2, no murmur; No lower extremity edema; Peripheral pulses are 2+ bilaterally, Warm  ABDOMEN: Nontender to palpation, normoactive bowel sounds  MUSCULOSKELETAL:  no clubbing or cyanosis of digits; no joint swelling or tenderness to palpation  PSYCH: A+O to person, place, and time; affect appropriate  NEUROLOGY: CN 2-12 are intact and symmetric; no gross sensory deficits   SKIN: No rashes; no palpable lesions, LLE foot wrapped, Ace bandage    LABS:                        9.0    8.95  )-----------( 309      ( 11 Aug 2023 07:06 )             27.8     08-11    137  |  102  |  24<H>  ----------------------------<  209<H>  4.0   |  22  |  1.68<H>    Ca    8.3<L>      11 Aug 2023 07:06  Phos  3.0     08-11  Mg     2.4     08-11      PT/INR - ( 10 Aug 2023 04:55 )   PT: 12.0 sec;   INR: 1.09 ratio         PTT - ( 10 Aug 2023 04:55 )  PTT:25.6 sec      Urinalysis Basic - ( 11 Aug 2023 07:06 )    Color: x / Appearance: x / SG: x / pH: x  Gluc: 209 mg/dL / Ketone: x  / Bili: x / Urobili: x   Blood: x / Protein: x / Nitrite: x   Leuk Esterase: x / RBC: x / WBC x   Sq Epi: x / Non Sq Epi: x / Bacteria: x        Culture - Tissue with Gram Stain (collected 08 Aug 2023 22:04)  Source: .Tissue Other  Gram Stain (09 Aug 2023 03:14):    No polymorphonuclear cells seen per low power field    No organisms seen per oil power field  Preliminary Report (10 Aug 2023 08:07):    No growth to date.

## 2023-08-11 NOTE — ADVANCED PRACTICE NURSE CONSULT - ASSESSMENT
PICC Insertion Note  Patient or patient  educated about central line associated blood stream infection prevention practices.  Catheter type: SL Picc  : Bard  Power injectable: Yes  LOT# [id number]    Informed consent obtained by covering floor team.  Procedure assisted by:  LIVE Oropeza RN  Time out was preformed, confirming the patient's first and last name, date of birth, procedure, and correct site prior to state of procedure.    Patient was placed in supine position. Patient placement site was prepped with chlorhexidine solution, then draped using maximum sterile barrier protection. The area was injected with  2  ml of 1% lidocaine. Using the ultrasound, the catheter was introduced. Strict adherence to outline aseptic technique. Upon completion of line placement, the insertion site was covered with a sterile occlusive dressing. Pt tolerated procedure well. Minimal blood loss.   Pre procedure v/s: /80, HR 90 , Temp 98.6, O2sat 98%  Post Procedure v/s /79, HR 87, Temp 98.8, O2sat 97%    All materials used for catheter insertion, including the intact guide wire, were accounted for at the end of the procedure.    Number of attempts: 1  Complications/Comments: None    Emergency Placement: No  Site: New  Anatomical Site of insertion: Right basilic  Catheter size/length: 4 F, 51 CM  US guided Bard Single lumen power picc placed    Post procedure verification with chest Xray as per orders.

## 2023-08-11 NOTE — PROGRESS NOTE ADULT - ASSESSMENT
62M w/ hx of CAD s/p multiple TOM 2016, IDDM2, CKD, HTN, HLD admitted 8/3 c/o L sided foot wound and chills.  Patient has DM over 20 years and has neuropathy.  Was visiting [place] (family) and was wearing new shoes.  Developed a blister R top of his foot and wound left foot.  Here he had chills at home.  In the .3.  BC drawn.  Given clindamycin, vancomycin and zosyn.  Noted to have malodor.  XRAY (-).  MRI confirms OM    left foot infection with eschar and malodorous  - MRI with OM  - s/p bone biopsy  - s/p angioplasty  - culture (+) esbl ecoli, bacteroides  - continue ertapenem D#8 antibiotics  - podiatry has no further plans for bone resection  - will plan for PICC and 6 weeks IV antibiotics  - ertapenem 1g daily until 9/13/2023  - weekly cbc, cmp, esr, crp fax to [phone]    R Phlebitis  - better  - continue warm compresses, arm elevation    Please call the ID service [phone] with questions or concerns over the weekend

## 2023-08-11 NOTE — PROGRESS NOTE ADULT - ASSESSMENT
62M presents with left foot wound to subq and BL foot bullae.  - Patient seen and evaluated.  - Afebrile, no leucytosis  - 8/8 s/p left foot incision and drainage with bone biopsy: sutures intact, fibrogranuler base. no drainage, no pus, no malodor, no purulent drainage, tunneling from lateral to central midfoot, mild erythema,  dorsal forefoot ruptured bulla with wound to dermis, minimal sanguinous drainage, no signs of infections.  - high concern for residual bone infection  - low concern for viability, adequate intra-op bleeding  - Vascular angio thursday 8/10: Single vessel runoff from the AT to the foot with collaterals to the PT. Improved inflow to the tibial vessels and foot following revascularization with drug coated balloon angioplasty of the popliteal artery, angioplasty of the AT artery.  - Podiatry plan: local wound care pending bone biopsy, 6 weeks IV abx pending ID recs.  - Seen with attending 62M presents with left foot wound to subq and BL foot bullae.  - Patient seen and evaluated.  - Afebrile, no leucytosis  - 8/8 s/p left foot incision and drainage with bone biopsy: sutures intact, fibrogranuler base. no drainage, no pus, no malodor, no purulent drainage, tunneling from lateral to central midfoot, mild erythema,  dorsal forefoot ruptured bulla with wound to dermis, minimal sanguinous drainage, no signs of infections.  - high concern for residual bone infection  - low concern for viability, adequate intra-op bleeding  - Vascular angio thursday 8/10: Single vessel runoff from the AT to the foot with collaterals to the PT. Improved inflow to the tibial vessels and foot following revascularization with drug coated balloon angioplasty of the popliteal artery, angioplasty of the AT artery.  - ID recs, appreciated  - Podiatry plan: local wound care pending bone biopsy, 6 weeks IV ertapenem 1 gm till 9/13  - Discussed with attending

## 2023-08-11 NOTE — PROGRESS NOTE ADULT - SUBJECTIVE AND OBJECTIVE BOX
Patient is a 62y old  Male who presents with a chief complaint of Diabetic foot infection (10 Aug 2023 16:11)       INTERVAL HPI/OVERNIGHT EVENTS:  Patient seen and evaluated at bedside.  Pt is resting comfortable in NAD. Denies N/V/F/C.    Allergies    No Known Allergies    Intolerances        Vital Signs Last 24 Hrs  T(C): 36.8 (11 Aug 2023 05:10), Max: 36.8 (11 Aug 2023 05:10)  T(F): 98.3 (11 Aug 2023 05:10), Max: 98.3 (11 Aug 2023 05:10)  HR: 80 (11 Aug 2023 05:10) (72 - 81)  BP: 106/60 (11 Aug 2023 05:10) (96/53 - 110/56)  BP(mean): 76 (10 Aug 2023 15:00) (69 - 77)  RR: 18 (11 Aug 2023 05:10) (14 - 18)  SpO2: 92% (11 Aug 2023 05:10) (92% - 100%)    Parameters below as of 11 Aug 2023 05:10  Patient On (Oxygen Delivery Method): room air        LABS:                        9.0    8.95  )-----------( 309      ( 11 Aug 2023 07:06 )             27.8     08-11    137  |  102  |  24<H>  ----------------------------<  209<H>  4.0   |  22  |  1.68<H>    Ca    8.3<L>      11 Aug 2023 07:06  Phos  3.0     08-11  Mg     2.4     08-11      PT/INR - ( 10 Aug 2023 04:55 )   PT: 12.0 sec;   INR: 1.09 ratio         PTT - ( 10 Aug 2023 04:55 )  PTT:25.6 sec  Urinalysis Basic - ( 11 Aug 2023 07:06 )    Color: x / Appearance: x / SG: x / pH: x  Gluc: 209 mg/dL / Ketone: x  / Bili: x / Urobili: x   Blood: x / Protein: x / Nitrite: x   Leuk Esterase: x / RBC: x / WBC x   Sq Epi: x / Non Sq Epi: x / Bacteria: x      CAPILLARY BLOOD GLUCOSE      POCT Blood Glucose.: 212 mg/dL (11 Aug 2023 07:35)  POCT Blood Glucose.: 226 mg/dL (10 Aug 2023 21:06)  POCT Blood Glucose.: 205 mg/dL (10 Aug 2023 16:30)  POCT Blood Glucose.: 205 mg/dL (10 Aug 2023 12:07)      Lower Extremity Physical Exam:  Vascular: DP/PT 0/4 B/L, CFT <3sec x 10, Temp gradient_ B/L  Neurology: Epicritic sensation diminished to level of digits, B/L  Musculoskeletal/Ortho: Unremarkable.  Skin: 8/8 s/p left foot incision and drainage with bone biopsy: fibrogranuler, sutures intact, serous drainage, no pus, no malodor, no purulent drainage, tunneling from lateral to central midfoot, mild erythema, dorsal forefoot ruptured bulla with wound to dermis, minimal sanguinous drainage, no signs of infections.    RADIOLOGY & ADDITIONAL TESTS:

## 2023-08-11 NOTE — PROGRESS NOTE ADULT - SUBJECTIVE AND OBJECTIVE BOX
Patient is a 62y old  Male who presents with a chief complaint of Diabetic foot infection (04 Aug 2023 12:15)    f/u wound    Interval History/ROS:  s/p left foot I+D with bone biopsy 8/8.  s/p angiogram and angioplasty.  no pain.   Remainder of ROS otherwise negative.    PAST MEDICAL & SURGICAL HISTORY:  Diabetes mellitus  CAD (coronary artery disease)  Stage 3 chronic kidney disease  Essential hypertension    Allergies  No Known Allergies    ANTIMICROBIALS:  cefepime   IVPB 2000 every 12 hours (8/3-8/6)  vancomycin  IVPB 1250 every 24 hours (8/3-8/6)  metroNIDAZOLE  IVPB (8/4-8/6)    active:  ertapenem  IVPB 1000 every 24 hours (8/6-)    ertapenem  IVPB    ertapenem  IVPB 1000 every 24 hours      MEDICATIONS  (STANDING):  aspirin enteric coated 81 daily  atorvastatin 80 at bedtime  carvedilol 3.125 every 12 hours  insulin glargine Injectable (LANTUS) 20 at bedtime  insulin lispro (ADMELOG) corrective regimen sliding scale  three times a day before meals  insulin lispro (ADMELOG) corrective regimen sliding scale  at bedtime  insulin lispro Injectable (ADMELOG) 2 three times a day before meals  lisinopril 20 daily  ticagrelor 60 every 12 hours    Vital Signs Last 24 Hrs  T(F): 99 (08-11-23 @ 08:46), Max: 99 (08-11-23 @ 08:46)  HR: 92 (08-11-23 @ 08:46)  BP: 134/66 (08-11-23 @ 08:46)  RR: 18 (08-11-23 @ 08:46)  SpO2: 93% (08-11-23 @ 08:46) (92% - 100%)    PHYSICAL EXAM:  Constitutional: non-toxic, no distress  HEAD/EYES: anicteric  ENT:  supple  Cardiovascular:   normal S1, S2  Respiratory:  clear BS bilaterally  GI:  soft, non-tender, normal bowel sounds  :  no santos  Musculoskeletal:  no synovitis  Neurologic: awake and alert, normal strength, b/l feet with neuropathy  Skin:  L foot with post-op dressing  Psychiatric:  awake, alert, appropriate mood                                             9.0    8.95  )-----------( 309      ( 11 Aug 2023 07:06 )             27.8 08-11    137  |  102  |  24  ----------------------------<  209  4.0   |  22  |  1.68  Ca    8.3      11 Aug 2023 07:06Phos  3.0     08-11Mg     2.4     08-11    Sedimentation Rate, Erythrocyte: 96 (08-03 @ 21:06)  C-Reactive Protein, Serum: 61 (08-03 @ 21:06)    MICROBIOLOGY:  Culture - Tissue with Gram Stain (collected 08-08-23 @ 22:04)  Source: .Tissue Other  Gram Stain (08-09-23 @ 03:14):    No polymorphonuclear cells seen per low power field    No organisms seen per oil power field  Preliminary Report (08-10-23 @ 08:07):    No growth to date.    Culture - Abscess with Gram Stain (collected 08-04-23 @ 07:28)  Source: .Abscess left foot  Gram Stain (08-04-23 @ 16:04):    No polymorphonuclear leukocytes seen per low power field    Moderate Gram Positive Cocci in Clusters seen per oil power field  Preliminary Report (08-06-23 @ 12:44):    Few Escherichia coli ESBL    Rare Staphylococcus epidermidis "Susceptibilities not performed"    Moderate Bacteroides fragilis "Susceptibilities not performed"  Organism: Escherichia coli ESBL (08-06-23 @ 10:01)  Organism: Escherichia coli ESBL (08-06-23 @ 10:01)      Method Type: CORTES      -  Amikacin: S <=16      -  Amoxicillin/Clavulanic Acid: I 16/8      -  Ampicillin: R 16 These ampicillin results predict results for amoxicillin      -  Ampicillin/Sulbactam: R <=4/2 Enterobacter, Klebsiella aerogenes, Citrobacter, and Serratia may develop resistance during prolonged therapy (3-4 days)      -  Aztreonam: R >16      -  Cefazolin: R >16 Enterobacter, Klebsiella aerogenes, Citrobacter, and Serratia may develop resistance during prolonged therapy (3-4 days)      -  Cefepime: R 8      -  Ceftriaxone: R <=1 Enterobacter, Klebsiella aerogenes, Citrobacter, and Serratia may develop resistance during prolonged therapy      -  Ciprofloxacin: I 0.5      -  Ertapenem: S <=0.5      -  Gentamicin: S <=2      -  Imipenem: S <=1      -  Levofloxacin: S <=0.5      -  Meropenem: S <=1      -  Piperacillin/Tazobactam: R <=8      -  Tobramycin: R >8      -  Trimethoprim/Sulfamethoxazole: R >2/38    Culture - Urine (collected 08-03-23 @ 21:19)  Source: Clean Catch Clean Catch (Midstream)  Final Report (08-05-23 @ 06:44):    <10,000 CFU/mL Normal Urogenital Radha    Culture - Blood (collected 08-03-23 @ 21:02)  Source: .Blood Blood-Peripheral  Preliminary Report (08-07-23 @ 01:01):    No growth at 72 Hours    Culture - Blood (collected 08-03-23 @ 20:45)  Source: .Blood Blood-Peripheral  Preliminary Report (08-07-23 @ 01:01):    No growth at 72 Hours    RADIOLOGY:  imaging below personally reviewed and agree with findings    MR Foot w/wo IV Cont, Left (08.04.23 @ 23:50) >  IMPRESSION:  Osteomyelitis within the lateral aspect of the base of the fifth metatarsal with adjacent soft tissue ulcer.    Xray Foot AP + Lateral, Left (08.03.23 @ 21:26) >  IMPRESSION:  Small ulcer about the base of the left fifth metatarsal with a clustered foci of soft tissue gas, suggestive of infection. No definite radiographic evidence for osteomyelitis at the base of the fifth metatarsal; MRI would provide a more sensitive evaluation.    Xray Chest 1 View AP/PA (08.03.23 @ 21:26) >  IMPRESSION:  Clear lungs.

## 2023-08-11 NOTE — PROGRESS NOTE ADULT - PROBLEM SELECTOR PLAN 2
pt with L foot wound with MRI showing L 5th base OM   - Wound cx showing ESBL->ertapenem  - NIDHI/PVRs reviewed, s/p angiogram 8/10 with angioplasty popliteal and AT  - s/p OR with podiatry with I&D and bone bx 8/8, pus expressed no graft placed given c/f residual infection  - f/u bone bx results  - Podiatry not planning further intervention  - ID following, planning 6 weeks ertapenem, finish 9/13  - Pending PICC

## 2023-08-11 NOTE — PROGRESS NOTE ADULT - ASSESSMENT
62M w/ hx of CAD s/p multiple TOM 2016, IDDM2, CKD, HTN, HLD p/w L sided foot wound and chills. MRI w/ findings of OM within lateral aspect of base of 5th metatarsal. Podiatry planning possible intervention, vascular surgery consulted. ABIs done, L 1.07, R 0.75. Patient is now s/p left angiogram. Patient is recovering well. Denies pain or any distress. Access site shows no swelling or hematoma.     PLAN:   - Continue ASA, statin  - Continue abx per ID recs  - Care per primary team    Vascular Surgery  [phone]

## 2023-08-11 NOTE — ADVANCED PRACTICE NURSE CONSULT - RECOMMEDATIONS
Post procedure verbal instructions given to the patient . Patient   instructed regarding signs and symptoms of infection. Patient instructed to keep dressing dry and clean. Care for catheter as per hospital protocols

## 2023-08-12 LAB
ANION GAP SERPL CALC-SCNC: 11 MMOL/L — SIGNIFICANT CHANGE UP (ref 5–17)
BUN SERPL-MCNC: 22 MG/DL — SIGNIFICANT CHANGE UP (ref 7–23)
CALCIUM SERPL-MCNC: 8.9 MG/DL — SIGNIFICANT CHANGE UP (ref 8.4–10.5)
CHLORIDE SERPL-SCNC: 102 MMOL/L — SIGNIFICANT CHANGE UP (ref 96–108)
CO2 SERPL-SCNC: 24 MMOL/L — SIGNIFICANT CHANGE UP (ref 22–31)
CREAT SERPL-MCNC: 1.4 MG/DL — HIGH (ref 0.5–1.3)
EGFR: 56 ML/MIN/1.73M2 — LOW
GLUCOSE BLDC GLUCOMTR-MCNC: 252 MG/DL — HIGH (ref 70–99)
GLUCOSE BLDC GLUCOMTR-MCNC: 255 MG/DL — HIGH (ref 70–99)
GLUCOSE BLDC GLUCOMTR-MCNC: 274 MG/DL — HIGH (ref 70–99)
GLUCOSE BLDC GLUCOMTR-MCNC: 280 MG/DL — HIGH (ref 70–99)
GLUCOSE SERPL-MCNC: 251 MG/DL — HIGH (ref 70–99)
POTASSIUM SERPL-MCNC: 4.3 MMOL/L — SIGNIFICANT CHANGE UP (ref 3.5–5.3)
POTASSIUM SERPL-SCNC: 4.3 MMOL/L — SIGNIFICANT CHANGE UP (ref 3.5–5.3)
SODIUM SERPL-SCNC: 137 MMOL/L — SIGNIFICANT CHANGE UP (ref 135–145)

## 2023-08-12 PROCEDURE — 99232 SBSQ HOSP IP/OBS MODERATE 35: CPT

## 2023-08-12 RX ADMIN — Medication 3: at 12:18

## 2023-08-12 RX ADMIN — Medication 3: at 16:49

## 2023-08-12 RX ADMIN — TICAGRELOR 60 MILLIGRAM(S): 90 TABLET ORAL at 17:16

## 2023-08-12 RX ADMIN — ATORVASTATIN CALCIUM 80 MILLIGRAM(S): 80 TABLET, FILM COATED ORAL at 21:09

## 2023-08-12 RX ADMIN — CARVEDILOL PHOSPHATE 3.12 MILLIGRAM(S): 80 CAPSULE, EXTENDED RELEASE ORAL at 05:42

## 2023-08-12 RX ADMIN — ERTAPENEM SODIUM 120 MILLIGRAM(S): 1 INJECTION, POWDER, LYOPHILIZED, FOR SOLUTION INTRAMUSCULAR; INTRAVENOUS at 13:08

## 2023-08-12 RX ADMIN — CHLORHEXIDINE GLUCONATE 1 APPLICATION(S): 213 SOLUTION TOPICAL at 11:27

## 2023-08-12 RX ADMIN — INSULIN GLARGINE 20 UNIT(S): 100 INJECTION, SOLUTION SUBCUTANEOUS at 21:19

## 2023-08-12 RX ADMIN — CARVEDILOL PHOSPHATE 3.12 MILLIGRAM(S): 80 CAPSULE, EXTENDED RELEASE ORAL at 17:16

## 2023-08-12 RX ADMIN — Medication 1: at 21:19

## 2023-08-12 RX ADMIN — TICAGRELOR 60 MILLIGRAM(S): 90 TABLET ORAL at 05:42

## 2023-08-12 RX ADMIN — Medication 81 MILLIGRAM(S): at 11:26

## 2023-08-12 RX ADMIN — Medication 2 UNIT(S): at 16:50

## 2023-08-12 RX ADMIN — Medication 2 UNIT(S): at 07:46

## 2023-08-12 RX ADMIN — Medication 3: at 07:45

## 2023-08-12 RX ADMIN — LISINOPRIL 20 MILLIGRAM(S): 2.5 TABLET ORAL at 05:42

## 2023-08-12 RX ADMIN — Medication 2 UNIT(S): at 12:19

## 2023-08-12 NOTE — PROGRESS NOTE ADULT - PROBLEM SELECTOR PLAN 3
as above
On Lantus 20U QHS at home  -c/w reduced dose 16U QHS tonight, FS appear well controlled for now   -c/w low ISS and monitor fingersticks and sliding scale - adjust accordingly   -A1c pending
as above
as above

## 2023-08-12 NOTE — PROGRESS NOTE ADULT - PROBLEM SELECTOR PLAN 7
Crt similar to baseline  -Trend BMP  -Renal dose medications  -Limit nephrotoxic meds
Crt similar to baseline  -Trend BMP  -Renal dose medications  -Limit nephrotoxic meds
Cr similar to baseline  -Trend BMP  -Renal dose medications  -Limit nephrotoxic meds
Crt similar to baseline  -Trend BMP  -Renal dose medications  -Limit nephrotoxic meds
-Cont. Coreg and lisinopril as above  -Trend BP
Crt similar to baseline  -Trend BMP  -Renal dose medications  -Limit nephrotoxic meds
Crt similar to baseline  -Trend BMP  -Renal dose medications  -Limit nephrotoxic meds
Cr similar to baseline  -Trend BMP  -Renal dose medications  -Limit nephrotoxic meds
Cr similar to baseline  -Trend BMP  -Renal dose medications  -Limit nephrotoxic meds

## 2023-08-12 NOTE — PROGRESS NOTE ADULT - PROBLEM SELECTOR PLAN 10
DVT PPx, SCDs  DASH/CC diet  PT - no skilled PT needs
DVT PPx, SCDs  DASH/CC diet  PT - no skilled PT needs
DVT PPx, SCDs  DASH/CC diet  PT - no skilled PT needs    D/C on Sunday 8/13, after the patient received IV ertapenem in hospital.   Home with home care (IV antibiotics set up, wound vac care)
DVT PPx, SCDs  DASH/CC diet  PT - no skilled PT needs
DVT PPx, SCDs  DASH/CC diet  PT - no skilled PT needs    D/C pending PICC and home abx set up
DVT PPx, SCDs  DASH/CC diet  PT - no skilled PT needs

## 2023-08-12 NOTE — PROGRESS NOTE ADULT - PROBLEM SELECTOR PLAN 4
On Lantus 20U QHS at home  -FS uncontrolled on current regimen  -continue lantus 20units qHS,  admelog 2units with meals  -c/w low ISS and monitor fingersticks and sliding scale - adjust accordingly   -A1c 9.7
On Lantus 20U QHS at home  -FS uncontrolled on current regimen  - will increase lantus to home dose 20units, will add on admelog 2units with meals   -c/w low ISS and monitor fingersticks and sliding scale - adjust accordingly   -A1c 9.7
On Lantus 20U QHS at home  -FS uncontrolled on current regimen  -continue lantus 20units qHS,  admelog 2units with meals  -c/w low ISS and monitor fingersticks and sliding scale - adjust accordingly   -A1c 9.7
01/2023 TTE: Poor quality, multiple areas of akinesis and hypokinesis with EF 35-40%. Note pitting edema and warmth in L ankle  -Cont. lasix 40mg daily for now  -Cont. Coreg BID  -Cont. Lisinopril daily  - will order TTE to further evaluate prior to possible OR
On Lantus 20U QHS at home  -FS uncontrolled on current regimen  - continue lantus 20units qHS,  admelog 2units with meals   -c/w low ISS and monitor fingersticks and sliding scale - adjust accordingly   -A1c 9.7
On Lantus 20U QHS at home  -FS uncontrolled on current regimen  -continue lantus 20units qHS,  admelog 2units with meals, decreased lantus to 12 while npo  -c/w low ISS and monitor fingersticks and sliding scale - adjust accordingly   -A1c 9.7
On Lantus 20U QHS at home  -FS uncontrolled on current regimen  - continue lantus 20units qHS,  admelog 2units with meals   -c/w low ISS and monitor fingersticks and sliding scale - adjust accordingly   -A1c 9.7
On Lantus 20U QHS at home  -FS uncontrolled on current regimen  -continue lantus 20units qHS,  admelog 2units with meals  -c/w low ISS and monitor fingersticks and sliding scale - adjust accordingly   -A1c 9.7
On Lantus 20U QHS at home  -FS uncontrolled on current regimen  -continue lantus 20units qHS,  admelog 2units with meals, lantus 12 while npo   -c/w low ISS and monitor fingersticks and sliding scale - adjust accordingly   -A1c 9.7

## 2023-08-12 NOTE — PROGRESS NOTE ADULT - PROBLEM SELECTOR PROBLEM 3
Diabetic foot infection
Diabetes mellitus
Diabetic foot infection
Diabetic foot infection

## 2023-08-12 NOTE — PROGRESS NOTE ADULT - PROBLEM SELECTOR PROBLEM 9
Hyperlipidemia
Prophylactic measure

## 2023-08-12 NOTE — PROGRESS NOTE ADULT - PROBLEM SELECTOR PROBLEM 6
CAD (coronary artery disease)
Stage 3 chronic kidney disease
CAD (coronary artery disease)
CAD (coronary artery disease)

## 2023-08-12 NOTE — PROGRESS NOTE ADULT - PROBLEM SELECTOR PROBLEM 7
Stage 3 chronic kidney disease
Essential hypertension
Stage 3 chronic kidney disease

## 2023-08-12 NOTE — PROGRESS NOTE ADULT - TIME BILLING
reviewing emr, labs, imaging, coordination of care, discussion with patient, consultants, documentation
reviewing emr, labs, imaging, coordination of care, discussion with patient and wife, documentation
reviewing emr, labs, imaging, coordination of care, discussion with patient, documentation
chart reviewing, history taking, physical exam, assessment and documentation, including speaking to consultant and CM regarding the management.
reviewing emr, labs, imaging, coordination of care, discussion with patient and wife, documentation
reviewing emr, labs, imaging, coordination of care, discussion with patient, documentation
reviewing emr, labs, imaging, coordination of care, discussion with patient and wife, documentation
I spent 50 minutes on this patient encounter:  - Ordering, reviewing, and interpreting labs, testing, and imaging.  - Independently obtaining a review of systems and performing a physical exam  - Counselling and educating patient and family regarding interpretation of aforementioned items and plan of care.
none

## 2023-08-12 NOTE — PROGRESS NOTE ADULT - SUBJECTIVE AND OBJECTIVE BOX
Jayla Mckeon MD  Hospitalist  Available on MS Teams      PROGRESS NOTE:     Patient is a 62y old  Male who presents with a chief complaint of Diabetic foot infection (11 Aug 2023 14:47)      SUBJECTIVE / OVERNIGHT EVENTS:  No acute events overnight.    The patient was assessed at bedside. He states he feels well. No significant pain in the lower extremities. No subjective fevers/chills. No chest pain, palpitations, or shortness of breath.    MEDICATIONS  (STANDING):  aspirin enteric coated 81 milliGRAM(s) Oral daily  atorvastatin 80 milliGRAM(s) Oral at bedtime  carvedilol 3.125 milliGRAM(s) Oral every 12 hours  chlorhexidine 2% Cloths 1 Application(s) Topical daily  dextrose 5%. 1000 milliLiter(s) (100 mL/Hr) IV Continuous <Continuous>  dextrose 5%. 1000 milliLiter(s) (50 mL/Hr) IV Continuous <Continuous>  dextrose 50% Injectable 25 Gram(s) IV Push once  dextrose 50% Injectable 25 Gram(s) IV Push once  dextrose 50% Injectable 12.5 Gram(s) IV Push once  ertapenem  IVPB      ertapenem  IVPB 1000 milliGRAM(s) IV Intermittent every 24 hours  glucagon  Injectable 1 milliGRAM(s) IntraMuscular once  insulin glargine Injectable (LANTUS) 20 Unit(s) SubCutaneous at bedtime  insulin lispro (ADMELOG) corrective regimen sliding scale   SubCutaneous three times a day before meals  insulin lispro (ADMELOG) corrective regimen sliding scale   SubCutaneous at bedtime  insulin lispro Injectable (ADMELOG) 2 Unit(s) SubCutaneous three times a day before meals  lisinopril 20 milliGRAM(s) Oral daily  ticagrelor 60 milliGRAM(s) Oral every 12 hours    MEDICATIONS  (PRN):  acetaminophen     Tablet .. 650 milliGRAM(s) Oral every 6 hours PRN Temp greater or equal to 38C (100.4F), Mild Pain (1 - 3)  dextrose Oral Gel 15 Gram(s) Oral once PRN Blood Glucose LESS THAN 70 milliGRAM(s)/deciliter  HYDROmorphone  Injectable 0.5 milliGRAM(s) IV Push every 4 hours PRN Severe Pain (7 - 10)  melatonin 3 milliGRAM(s) Oral at bedtime PRN Insomnia  oxycodone    5 mG/acetaminophen 325 mG 1 Tablet(s) Oral every 4 hours PRN Moderate Pain (4 - 6)      CAPILLARY BLOOD GLUCOSE      POCT Blood Glucose.: 252 mg/dL (12 Aug 2023 12:12)  POCT Blood Glucose.: 274 mg/dL (12 Aug 2023 07:33)  POCT Blood Glucose.: 231 mg/dL (11 Aug 2023 21:54)  POCT Blood Glucose.: 211 mg/dL (11 Aug 2023 16:39)    I&O's Summary    11 Aug 2023 07:01  -  12 Aug 2023 07:00  --------------------------------------------------------  IN: 600 mL / OUT: 0 mL / NET: 600 mL        PHYSICAL EXAM:  Vital Signs Last 24 Hrs  T(C): 36.9 (12 Aug 2023 10:09), Max: 37.2 (11 Aug 2023 14:50)  T(F): 98.4 (12 Aug 2023 10:09), Max: 99 (11 Aug 2023 14:50)  HR: 91 (12 Aug 2023 10:09) (80 - 91)  BP: 110/66 (12 Aug 2023 10:09) (102/57 - 140/70)  BP(mean): 78 (11 Aug 2023 21:07) (78 - 78)  RR: 18 (12 Aug 2023 10:09) (16 - 18)  SpO2: 97% (12 Aug 2023 10:09) (96% - 100%)    Parameters below as of 12 Aug 2023 10:09  Patient On (Oxygen Delivery Method): room air      CONSTITUTIONAL: NAD, well-developed  EYES: PERRLA; conjunctiva and sclera clear  ENMT: Moist oral mucosa, no pharyngeal injection or exudates  RESPIRATORY: Normal respiratory effort; lungs are clear to auscultation bilaterally  CARDIOVASCULAR: Regular rate and rhythm, normal S1 and S2, no murmur; No lower extremity edema  ABDOMEN: Nontender to palpation, normoactive bowel sounds  MUSCULOSKELETAL:  no clubbing or cyanosis of digits; no joint swelling or tenderness to palpation  PSYCH: A+O to person, place, and time; affect appropriate  NEUROLOGY: CN 2-12 are intact and symmetric; no gross sensory deficits   SKIN: No rashes; no palpable lesions, LLE foot wrapped, Ace bandage      LABS:                        9.0    8.95  )-----------( 309      ( 11 Aug 2023 07:06 )             27.8     08-12    137  |  102  |  22  ----------------------------<  251<H>  4.3   |  24  |  1.40<H>    Ca    8.9      12 Aug 2023 09:53  Phos  3.0     08-11  Mg     2.4     08-11            Urinalysis Basic - ( 12 Aug 2023 09:53 )    Color: x / Appearance: x / SG: x / pH: x  Gluc: 251 mg/dL / Ketone: x  / Bili: x / Urobili: x   Blood: x / Protein: x / Nitrite: x   Leuk Esterase: x / RBC: x / WBC x   Sq Epi: x / Non Sq Epi: x / Bacteria: x          RADIOLOGY & ADDITIONAL TESTS:  Results Reviewed:   Imaging Personally Reviewed:  Electrocardiogram Personally Reviewed:    COORDINATION OF CARE:  Care Discussed with Consultants/Other Providers [Y/N]:  Prior or Outpatient Records Reviewed [Y/N]:   Jayla Mckeon MD  Hospitalist  Available on MS Teams      PROGRESS NOTE:     Patient is a 62y old  Male who presents with a chief complaint of Diabetic foot infection (11 Aug 2023 14:47)      SUBJECTIVE / OVERNIGHT EVENTS:  No acute events overnight.    The patient was assessed at bedside. He states he feels well. No significant pain in the lower extremities. No subjective fevers/chills. No chest pain, palpitations, or shortness of breath.    MEDICATIONS  (STANDING):  aspirin enteric coated 81 milliGRAM(s) Oral daily  atorvastatin 80 milliGRAM(s) Oral at bedtime  carvedilol 3.125 milliGRAM(s) Oral every 12 hours  chlorhexidine 2% Cloths 1 Application(s) Topical daily  dextrose 5%. 1000 milliLiter(s) (100 mL/Hr) IV Continuous <Continuous>  dextrose 5%. 1000 milliLiter(s) (50 mL/Hr) IV Continuous <Continuous>  dextrose 50% Injectable 25 Gram(s) IV Push once  dextrose 50% Injectable 25 Gram(s) IV Push once  dextrose 50% Injectable 12.5 Gram(s) IV Push once  ertapenem  IVPB      ertapenem  IVPB 1000 milliGRAM(s) IV Intermittent every 24 hours  glucagon  Injectable 1 milliGRAM(s) IntraMuscular once  insulin glargine Injectable (LANTUS) 20 Unit(s) SubCutaneous at bedtime  insulin lispro (ADMELOG) corrective regimen sliding scale   SubCutaneous three times a day before meals  insulin lispro (ADMELOG) corrective regimen sliding scale   SubCutaneous at bedtime  insulin lispro Injectable (ADMELOG) 2 Unit(s) SubCutaneous three times a day before meals  lisinopril 20 milliGRAM(s) Oral daily  ticagrelor 60 milliGRAM(s) Oral every 12 hours    MEDICATIONS  (PRN):  acetaminophen     Tablet .. 650 milliGRAM(s) Oral every 6 hours PRN Temp greater or equal to 38C (100.4F), Mild Pain (1 - 3)  dextrose Oral Gel 15 Gram(s) Oral once PRN Blood Glucose LESS THAN 70 milliGRAM(s)/deciliter  HYDROmorphone  Injectable 0.5 milliGRAM(s) IV Push every 4 hours PRN Severe Pain (7 - 10)  melatonin 3 milliGRAM(s) Oral at bedtime PRN Insomnia  oxycodone    5 mG/acetaminophen 325 mG 1 Tablet(s) Oral every 4 hours PRN Moderate Pain (4 - 6)      CAPILLARY BLOOD GLUCOSE      POCT Blood Glucose.: 252 mg/dL (12 Aug 2023 12:12)  POCT Blood Glucose.: 274 mg/dL (12 Aug 2023 07:33)  POCT Blood Glucose.: 231 mg/dL (11 Aug 2023 21:54)  POCT Blood Glucose.: 211 mg/dL (11 Aug 2023 16:39)    I&O's Summary    11 Aug 2023 07:01  -  12 Aug 2023 07:00  --------------------------------------------------------  IN: 600 mL / OUT: 0 mL / NET: 600 mL        PHYSICAL EXAM:  Vital Signs Last 24 Hrs  T(C): 36.9 (12 Aug 2023 10:09), Max: 37.2 (11 Aug 2023 14:50)  T(F): 98.4 (12 Aug 2023 10:09), Max: 99 (11 Aug 2023 14:50)  HR: 91 (12 Aug 2023 10:09) (80 - 91)  BP: 110/66 (12 Aug 2023 10:09) (102/57 - 140/70)  BP(mean): 78 (11 Aug 2023 21:07) (78 - 78)  RR: 18 (12 Aug 2023 10:09) (16 - 18)  SpO2: 97% (12 Aug 2023 10:09) (96% - 100%)    Parameters below as of 12 Aug 2023 10:09  Patient On (Oxygen Delivery Method): room air      CONSTITUTIONAL: NAD, well-developed  EYES: PERRLA; conjunctiva and sclera clear  ENMT: Moist oral mucosa, no pharyngeal injection or exudates  RESPIRATORY: Normal respiratory effort; lungs are clear to auscultation bilaterally  CARDIOVASCULAR: Regular rate and rhythm, normal S1 and S2, no murmur; No lower extremity edema. +PICC line in the left proximal arm, area appears clean  ABDOMEN: Nontender to palpation, normoactive bowel sounds  MUSCULOSKELETAL:  no clubbing or cyanosis of digits; no joint swelling or tenderness to palpation  PSYCH: A+O to person, place, and time; affect appropriate  NEUROLOGY: CN 2-12 are intact and symmetric; no gross sensory deficits   SKIN: No rashes; no palpable lesions, LLE foot wrapped, Ace bandage. +wound vac in the left lower extremity in place.      LABS:                        9.0    8.95  )-----------( 309      ( 11 Aug 2023 07:06 )             27.8     08-12    137  |  102  |  22  ----------------------------<  251<H>  4.3   |  24  |  1.40<H>    Ca    8.9      12 Aug 2023 09:53  Phos  3.0     08-11  Mg     2.4     08-11            Urinalysis Basic - ( 12 Aug 2023 09:53 )    Color: x / Appearance: x / SG: x / pH: x  Gluc: 251 mg/dL / Ketone: x  / Bili: x / Urobili: x   Blood: x / Protein: x / Nitrite: x   Leuk Esterase: x / RBC: x / WBC x   Sq Epi: x / Non Sq Epi: x / Bacteria: x          RADIOLOGY & ADDITIONAL TESTS:  Results Reviewed:   Imaging Personally Reviewed:  Electrocardiogram Personally Reviewed:    COORDINATION OF CARE:  Care Discussed with Consultants/Other Providers [Y/N]:  Prior or Outpatient Records Reviewed [Y/N]:

## 2023-08-12 NOTE — PROGRESS NOTE ADULT - PROBLEM SELECTOR PROBLEM 5
Chronic HFrEF (heart failure with reduced ejection fraction)
CAD (coronary artery disease)
Chronic HFrEF (heart failure with reduced ejection fraction)

## 2023-08-12 NOTE — PROGRESS NOTE ADULT - PROBLEM SELECTOR PLAN 6
S/p multiple stents in 2016  -Cont. asa and Brilinta BID, pt has picture of 90mg dose, will cont. 60mg dose based on outpt meds  -Cont. lipitor (on Rosuvastatin at home)
Crt similar to baseline  -Trend BMP  -Renal dose medications  -Limit nephrotoxic meds
S/p multiple stents in 2016  -Cont. asa and Brilinta BID, pt has picture of 90mg dose, will cont. 60mg dose based on outpt meds  -Cont. lipitor (on Rosuvastatin at home)

## 2023-08-12 NOTE — PROGRESS NOTE ADULT - PROBLEM SELECTOR PROBLEM 2
Osteomyelitis of ankle or foot, acute
Diabetic foot infection
Osteomyelitis of ankle or foot, acute

## 2023-08-12 NOTE — PROGRESS NOTE ADULT - PROBLEM SELECTOR PROBLEM 4
Diabetes mellitus
Chronic HFrEF (heart failure with reduced ejection fraction)
Diabetes mellitus

## 2023-08-12 NOTE — PROGRESS NOTE ADULT - PROBLEM SELECTOR PLAN 2
pt with L foot wound with MRI showing L 5th base OM   - Wound cx showing ESBL->ertapenem  - NIDHI/PVRs reviewed, s/p angiogram 8/10 with angioplasty popliteal and AT  - s/p OR with podiatry with I&D and bone bx 8/8, pus expressed no graft placed given c/f residual infection  - f/u bone bx results  - Podiatry not planning further intervention  - ID following, planning 6 weeks ertapenem, finish 9/13  - PICC line placed on 8/11  - +wound vac in the left lower extremity, recommended by podiatry. Maintain wound vac upon discharge

## 2023-08-12 NOTE — PROGRESS NOTE ADULT - PROBLEM SELECTOR PROBLEM 8
Essential hypertension
Hyperlipidemia
Essential hypertension

## 2023-08-12 NOTE — PROGRESS NOTE ADULT - PROBLEM SELECTOR PLAN 1
Meets criteria by HR and fever. Likely source is diabetic foot infection with OM   -Cont. empiric IV antibiotics  -F/u BCxs  -F/u UCx  -Tylenol PRN
-Cont. abx as bellow  -bcx, ucx ngtd
Sepsis now improved  -Cont. abx as below  -bcx, ucx ngtd
Meets criteria by HR and fever. Likely source is diabetic foot infection with OM   -Cont. empiric IV antibiotics  -F/u BCxs  -F/u UCx  -Tylenol PRN
Meets criteria by HR and fever. Likely source is diabetic foot infection with OM   -Cont. abx as bellow  -bcx, ucx ngtd
Meets criteria by HR and fever. Likely source is diabetic foot infection with OM   -Cont. abx as bellow  -bcx, ucx ngtd
Meets criteria by HR and fever. Likely source is diabetic foot infection, r/o OM  -Cont. empiric IV antibiotics  -F/u BCxs  -F/u UCx  -Tylenol PRN
Meets criteria by HR and fever. Likely source is diabetic foot infection with OM   -Cont. abx as bellow  -bcx, ucx ngtd
Meets criteria by HR and fever. Likely source is diabetic foot infection with OM   -Cont. abx as bellow  -bcx, ucx ngtd

## 2023-08-12 NOTE — PROGRESS NOTE ADULT - PROBLEM SELECTOR PLAN 5
01/2023 TTE: Poor quality, multiple areas of akinesis and hypokinesis with EF 35-40%. Note pitting edema and warmth in L ankle. Repeat TTE 8/4 showing EF 49% and multiple segmental abnormalities   -Cont. Coreg BID  -Cont. Lisinopril daily  -holding lasix for now for soft BPs  -currently euvolemic, reassess lasixs needs daily
01/2023 TTE: Poor quality, multiple areas of akinesis and hypokinesis with EF 35-40%. Note pitting edema and warmth in L ankle. Repeat TTE 8/4 showing EF 49% and multiple segmental abnormalities   -Cont. Coreg BID  -Cont. Lisinopril daily  -holding lasix for now for soft BPs
01/2023 TTE: Poor quality, multiple areas of akinesis and hypokinesis with EF 35-40%. Note pitting edema and warmth in L ankle. Repeat TTE 8/4 showing EF 49% and multiple segmental abnormalities   -Cont. Coreg BID  -Cont. Lisinopril daily  -holding lasix for now for soft BPs  -currently euvolemic, reassess lasix needs daily
S/p multiple stents in 2016  -Cont. asa and Brilinta BID, pt has picture of 90mg dose, will cont. 60mg dose based on outpt meds  -Cont. lipitor (on Rosuvastatin at home)
01/2023 TTE: Poor quality, multiple areas of akinesis and hypokinesis with EF 35-40%. Note pitting edema and warmth in L ankle. Repeat TTE 8/4 showing EF 49% and multiple segmental abnormalities   -Cont. Coreg BID  -Cont. Lisinopril daily  -holding lasix for now for soft BPs  -currently euvolemic, reassess lasixs needs daily
01/2023 TTE: Poor quality, multiple areas of akinesis and hypokinesis with EF 35-40%. Note pitting edema and warmth in L ankle. Repeat TTE 8/4 showing EF 49% and multiple segmental abnormalities   -Cont. Coreg BID  -Cont. Lisinopril daily  -holding lasix for now for soft BPs
01/2023 TTE: Poor quality, multiple areas of akinesis and hypokinesis with EF 35-40%. Note pitting edema and warmth in L ankle. Repeat TTE 8/4 showing EF 49% and multiple segmental abnormalities   -Cont. Coreg BID  -Cont. Lisinopril daily  -holding lasix for now for soft BPs  -currently euvolemic, reassess lasix needs daily
01/2023 TTE: Poor quality, multiple areas of akinesis and hypokinesis with EF 35-40%. Note pitting edema and warmth in L ankle. Repeat TTE 8/4 showing EF 49% and multiple segmental abnormalities   -Cont. Coreg BID  -Cont. Lisinopril daily  -holding lasix for now for soft BPs  -currently euvolemic, reassess lasix needs daily
01/2023 TTE: Poor quality, multiple areas of akinesis and hypokinesis with EF 35-40%. Note pitting edema and warmth in L ankle. Repeat TTE 8/4 showing EF 49% and multiple segmental abnormalities   -Cont. Coreg BID  -Cont. Lisinopril daily  -holding lasix for now for soft BPs

## 2023-08-12 NOTE — PROGRESS NOTE ADULT - PROBLEM SELECTOR PLAN 8
-Cont. Coreg and lisinopril as above  -Trend BP
-c/w lipitor 80mg (on crestor at home)
-Cont. Coreg and lisinopril as above  -Trend BP

## 2023-08-12 NOTE — PROGRESS NOTE ADULT - PROBLEM SELECTOR PLAN 9
-c/w lipitor 80mg (on crestor at home)
DVT PPx, SCDs  DASH/CC diet  PT pending
-c/w lipitor 80mg (on crestor at home)

## 2023-08-13 ENCOUNTER — TRANSCRIPTION ENCOUNTER (OUTPATIENT)
Age: 63
End: 2023-08-13

## 2023-08-13 VITALS
TEMPERATURE: 99 F | SYSTOLIC BLOOD PRESSURE: 150 MMHG | DIASTOLIC BLOOD PRESSURE: 88 MMHG | OXYGEN SATURATION: 96 % | HEART RATE: 84 BPM | RESPIRATION RATE: 18 BRPM

## 2023-08-13 LAB
GLUCOSE BLDC GLUCOMTR-MCNC: 223 MG/DL — HIGH (ref 70–99)
GLUCOSE BLDC GLUCOMTR-MCNC: 241 MG/DL — HIGH (ref 70–99)
GLUCOSE BLDC GLUCOMTR-MCNC: 242 MG/DL — HIGH (ref 70–99)

## 2023-08-13 PROCEDURE — C2623: CPT

## 2023-08-13 PROCEDURE — 82330 ASSAY OF CALCIUM: CPT

## 2023-08-13 PROCEDURE — 97164 PT RE-EVAL EST PLAN CARE: CPT

## 2023-08-13 PROCEDURE — 86803 HEPATITIS C AB TEST: CPT

## 2023-08-13 PROCEDURE — 87086 URINE CULTURE/COLONY COUNT: CPT

## 2023-08-13 PROCEDURE — 81001 URINALYSIS AUTO W/SCOPE: CPT

## 2023-08-13 PROCEDURE — 96375 TX/PRO/DX INJ NEW DRUG ADDON: CPT

## 2023-08-13 PROCEDURE — C1725: CPT

## 2023-08-13 PROCEDURE — 85730 THROMBOPLASTIN TIME PARTIAL: CPT

## 2023-08-13 PROCEDURE — 82803 BLOOD GASES ANY COMBINATION: CPT

## 2023-08-13 PROCEDURE — 71045 X-RAY EXAM CHEST 1 VIEW: CPT

## 2023-08-13 PROCEDURE — 82565 ASSAY OF CREATININE: CPT

## 2023-08-13 PROCEDURE — 96374 THER/PROPH/DIAG INJ IV PUSH: CPT

## 2023-08-13 PROCEDURE — C9399: CPT

## 2023-08-13 PROCEDURE — 84132 ASSAY OF SERUM POTASSIUM: CPT

## 2023-08-13 PROCEDURE — C1751: CPT

## 2023-08-13 PROCEDURE — 87040 BLOOD CULTURE FOR BACTERIA: CPT

## 2023-08-13 PROCEDURE — 87205 SMEAR GRAM STAIN: CPT

## 2023-08-13 PROCEDURE — 87077 CULTURE AEROBIC IDENTIFY: CPT

## 2023-08-13 PROCEDURE — C1769: CPT

## 2023-08-13 PROCEDURE — 82435 ASSAY OF BLOOD CHLORIDE: CPT

## 2023-08-13 PROCEDURE — 82947 ASSAY GLUCOSE BLOOD QUANT: CPT

## 2023-08-13 PROCEDURE — 83735 ASSAY OF MAGNESIUM: CPT

## 2023-08-13 PROCEDURE — 36415 COLL VENOUS BLD VENIPUNCTURE: CPT

## 2023-08-13 PROCEDURE — C8929: CPT

## 2023-08-13 PROCEDURE — 86900 BLOOD TYPING SEROLOGIC ABO: CPT

## 2023-08-13 PROCEDURE — 97161 PT EVAL LOW COMPLEX 20 MIN: CPT

## 2023-08-13 PROCEDURE — 85025 COMPLETE CBC W/AUTO DIFF WBC: CPT

## 2023-08-13 PROCEDURE — 85018 HEMOGLOBIN: CPT

## 2023-08-13 PROCEDURE — 87186 SC STD MICRODIL/AGAR DIL: CPT

## 2023-08-13 PROCEDURE — 86901 BLOOD TYPING SEROLOGIC RH(D): CPT

## 2023-08-13 PROCEDURE — 84295 ASSAY OF SERUM SODIUM: CPT

## 2023-08-13 PROCEDURE — C1887: CPT

## 2023-08-13 PROCEDURE — 85610 PROTHROMBIN TIME: CPT

## 2023-08-13 PROCEDURE — 84100 ASSAY OF PHOSPHORUS: CPT

## 2023-08-13 PROCEDURE — 88311 DECALCIFY TISSUE: CPT

## 2023-08-13 PROCEDURE — 80048 BASIC METABOLIC PNL TOTAL CA: CPT

## 2023-08-13 PROCEDURE — 36569 INSJ PICC 5 YR+ W/O IMAGING: CPT

## 2023-08-13 PROCEDURE — A9585: CPT

## 2023-08-13 PROCEDURE — 82962 GLUCOSE BLOOD TEST: CPT

## 2023-08-13 PROCEDURE — 73620 X-RAY EXAM OF FOOT: CPT

## 2023-08-13 PROCEDURE — 85652 RBC SED RATE AUTOMATED: CPT

## 2023-08-13 PROCEDURE — 93923 UPR/LXTR ART STDY 3+ LVLS: CPT

## 2023-08-13 PROCEDURE — 80053 COMPREHEN METABOLIC PANEL: CPT

## 2023-08-13 PROCEDURE — 86140 C-REACTIVE PROTEIN: CPT

## 2023-08-13 PROCEDURE — 85027 COMPLETE CBC AUTOMATED: CPT

## 2023-08-13 PROCEDURE — 85014 HEMATOCRIT: CPT

## 2023-08-13 PROCEDURE — 97605 NEG PRS WND THER DME<=50SQCM: CPT

## 2023-08-13 PROCEDURE — 76000 FLUOROSCOPY <1 HR PHYS/QHP: CPT

## 2023-08-13 PROCEDURE — 83036 HEMOGLOBIN GLYCOSYLATED A1C: CPT

## 2023-08-13 PROCEDURE — 73720 MRI LWR EXTREMITY W/O&W/DYE: CPT

## 2023-08-13 PROCEDURE — 80202 ASSAY OF VANCOMYCIN: CPT

## 2023-08-13 PROCEDURE — 87070 CULTURE OTHR SPECIMN AEROBIC: CPT

## 2023-08-13 PROCEDURE — 87075 CULTR BACTERIA EXCEPT BLOOD: CPT

## 2023-08-13 PROCEDURE — 86850 RBC ANTIBODY SCREEN: CPT

## 2023-08-13 PROCEDURE — C1760: CPT

## 2023-08-13 PROCEDURE — 99285 EMERGENCY DEPT VISIT HI MDM: CPT

## 2023-08-13 PROCEDURE — 88304 TISSUE EXAM BY PATHOLOGIST: CPT

## 2023-08-13 PROCEDURE — C1894: CPT

## 2023-08-13 PROCEDURE — 83880 ASSAY OF NATRIURETIC PEPTIDE: CPT

## 2023-08-13 PROCEDURE — 83605 ASSAY OF LACTIC ACID: CPT

## 2023-08-13 PROCEDURE — 99239 HOSP IP/OBS DSCHRG MGMT >30: CPT

## 2023-08-13 RX ORDER — ATORVASTATIN CALCIUM 80 MG/1
1 TABLET, FILM COATED ORAL
Qty: 30 | Refills: 0
Start: 2023-08-13 | End: 2023-09-11

## 2023-08-13 RX ADMIN — LISINOPRIL 20 MILLIGRAM(S): 2.5 TABLET ORAL at 05:25

## 2023-08-13 RX ADMIN — Medication 2 UNIT(S): at 16:48

## 2023-08-13 RX ADMIN — Medication 2: at 16:48

## 2023-08-13 RX ADMIN — Medication 2 UNIT(S): at 07:57

## 2023-08-13 RX ADMIN — ERTAPENEM SODIUM 120 MILLIGRAM(S): 1 INJECTION, POWDER, LYOPHILIZED, FOR SOLUTION INTRAMUSCULAR; INTRAVENOUS at 13:31

## 2023-08-13 RX ADMIN — CHLORHEXIDINE GLUCONATE 1 APPLICATION(S): 213 SOLUTION TOPICAL at 11:07

## 2023-08-13 RX ADMIN — TICAGRELOR 60 MILLIGRAM(S): 90 TABLET ORAL at 05:25

## 2023-08-13 RX ADMIN — Medication 2: at 07:58

## 2023-08-13 RX ADMIN — Medication 81 MILLIGRAM(S): at 12:06

## 2023-08-13 RX ADMIN — CARVEDILOL PHOSPHATE 3.12 MILLIGRAM(S): 80 CAPSULE, EXTENDED RELEASE ORAL at 17:47

## 2023-08-13 RX ADMIN — CARVEDILOL PHOSPHATE 3.12 MILLIGRAM(S): 80 CAPSULE, EXTENDED RELEASE ORAL at 05:25

## 2023-08-13 RX ADMIN — Medication 2 UNIT(S): at 12:05

## 2023-08-13 RX ADMIN — Medication 2: at 12:06

## 2023-08-13 RX ADMIN — TICAGRELOR 60 MILLIGRAM(S): 90 TABLET ORAL at 17:47

## 2023-08-13 NOTE — PROGRESS NOTE ADULT - SUBJECTIVE AND OBJECTIVE BOX
Podiatry pager #: [phone] ([place])/ [phone] ([facility])    Patient is a 62y old  Male who presents with a chief complaint of Diabetic foot infection (12 Aug 2023 13:13)       INTERVAL HPI/OVERNIGHT EVENTS:  Patient seen and evaluated at bedside.  Pt is resting comfortable in NAD. Denies N/V/F/C.     Allergies    No Known Allergies    Intolerances        Vital Signs Last 24 Hrs  T(C): 36.6 (13 Aug 2023 09:01), Max: 36.9 (12 Aug 2023 10:09)  T(F): 97.8 (13 Aug 2023 09:01), Max: 98.4 (12 Aug 2023 10:09)  HR: 70 (13 Aug 2023 09:01) (70 - 91)  BP: 102/63 (13 Aug 2023 09:01) (102/63 - 115/59)  BP(mean): --  RR: 18 (13 Aug 2023 09:01) (18 - 18)  SpO2: 95% (13 Aug 2023 09:01) (95% - 100%)    Parameters below as of 13 Aug 2023 09:01  Patient On (Oxygen Delivery Method): room air        LABS:    08-12    137  |  102  |  22  ----------------------------<  251<H>  4.3   |  24  |  1.40<H>    Ca    8.9      12 Aug 2023 09:53        Urinalysis Basic - ( 12 Aug 2023 09:53 )    Color: x / Appearance: x / SG: x / pH: x  Gluc: 251 mg/dL / Ketone: x  / Bili: x / Urobili: x   Blood: x / Protein: x / Nitrite: x   Leuk Esterase: x / RBC: x / WBC x   Sq Epi: x / Non Sq Epi: x / Bacteria: x      CAPILLARY BLOOD GLUCOSE      POCT Blood Glucose.: 242 mg/dL (13 Aug 2023 07:49)  POCT Blood Glucose.: 280 mg/dL (12 Aug 2023 21:16)  POCT Blood Glucose.: 255 mg/dL (12 Aug 2023 16:34)  POCT Blood Glucose.: 252 mg/dL (12 Aug 2023 12:12)      Lower Extremity Physical Exam:  Vascular: DP/PT 0/4 B/L, CFT <3sec x 10, Temp gradient_ B/L  Neurology: Epicritic sensation diminished to level of digits, B/L  Musculoskeletal/Ortho: Unremarkable.  Skin: 8/8 s/p left foot incision and drainage with bone biopsy: fibrogranuler, sutures intact, serous drainage, no pus, no malodor, no purulent drainage, tunneling from lateral to central midfoot, mild erythema, dorsal forefoot ruptured bulla with wound to dermis, minimal sanguinous drainage, no signs of infections.  RADIOLOGY & ADDITIONAL TESTS:

## 2023-08-13 NOTE — PROGRESS NOTE ADULT - PROVIDER SPECIALTY LIST ADULT
Internal Medicine
Podiatry
Infectious Disease
Internal Medicine
Podiatry
Podiatry
Vascular Surgery
Infectious Disease
Podiatry
Infectious Disease
Infectious Disease
Podiatry
Vascular Surgery
Vascular Surgery
Hospitalist
Internal Medicine
Hospitalist
Internal Medicine
Internal Medicine

## 2023-08-13 NOTE — DISCHARGE NOTE NURSING/CASE MANAGEMENT/SOCIAL WORK - PATIENT PORTAL LINK FT
You can access the FollowMyHealth Patient Portal offered by [facility] by registering at the following website: [email or web address]. By joining [employer]’s FollowMyHealth portal, you will also be able to view your health information using other applications (apps) compatible with our system.

## 2023-08-13 NOTE — PROGRESS NOTE ADULT - ASSESSMENT
62M s/p left foot incision and drainage with bone biopsy (DOS 8/8/23)  - Patient seen and evaluated.  - Afebrile, no leukocytosis  - 8/8 s/p left foot incision and drainage with bone biopsy: vac kept intact today  - intra- op findings: high concern for residual bone infection and  low concern for viability  - 8/10 s/p LLE  angio w AT run off an collaterals to PT.  - ID recs, appreciated  - Patient stable for discharge from a podiatry standpoint  - Follow up information can be found in discharge note provider under follow up   - Discussed with attending   62M s/p left foot incision and drainage with bone biopsy (DOS 8/8/23)  - Patient seen and evaluated.  - Afebrile, no leukocytosis  - 8/8 s/p left foot incision and drainage with bone biopsy: vac kept intact today  - intra- op findings: high concern for residual bone infection and  low concern for viability  - 8/10 s/p LLE  angio w AT run off an collaterals to PT.  - OR left 5th met Bone biopsy: no growth (prelim)  - ID recs, appreciated  - Patient stable for discharge from a podiatry standpoint  - Follow up information can be found in discharge note provider under follow up   - Discussed with attending

## 2023-08-13 NOTE — PROGRESS NOTE ADULT - REASON FOR ADMISSION
Diabetic foot infection

## 2023-08-13 NOTE — DISCHARGE NOTE NURSING/CASE MANAGEMENT/SOCIAL WORK - NSDCFUADDAPPT_GEN_ALL_CORE_FT
Podiatry Discharge Instructions:  Follow up: Please follow up with Dr. Urbina within 1 week of discharge from the hospital, please call [phone] for appointment and discuss that you recently were seen in the hospital.  Wound Care: Please apply vac to left foot wound @ 125 mmHg 3x per week continuously  Weight bearing: Please weight bear as tolerated in a surgical shoe.  Antibiotics: Please continue as instructed.          APPTS ARE READY TO BE MADE: [x] YES    Best Family or Patient Contact (if needed):    Additional Information about above appointments (if needed):    1:   2:   3:     Other comments or requests:

## 2023-08-13 NOTE — DISCHARGE NOTE NURSING/CASE MANAGEMENT/SOCIAL WORK - NSDCPEFALRISK_GEN_ALL_CORE
For information on Fall & Injury Prevention, visit: [email or web address] OR  [email or web address] OR  https://www.cdc.gov/steadi/patient.html

## 2023-08-15 LAB
CULTURE RESULTS: SIGNIFICANT CHANGE UP
SPECIMEN SOURCE: SIGNIFICANT CHANGE UP

## 2023-08-22 ENCOUNTER — APPOINTMENT (OUTPATIENT)
Dept: WOUND CARE | Facility: CLINIC | Age: 63
End: 2023-08-22
Payer: COMMERCIAL

## 2023-08-22 ENCOUNTER — OUTPATIENT (OUTPATIENT)
Dept: OUTPATIENT SERVICES | Facility: HOSPITAL | Age: 63
LOS: 1 days | End: 2023-08-22
Payer: COMMERCIAL

## 2023-08-22 VITALS — BODY MASS INDEX: 34.65 KG/M2 | WEIGHT: 242 LBS | HEIGHT: 70 IN | TEMPERATURE: 97.7 F

## 2023-08-22 DIAGNOSIS — Z98.890 OTHER SPECIFIED POSTPROCEDURAL STATES: Chronic | ICD-10-CM

## 2023-08-22 DIAGNOSIS — M43.22 FUSION OF SPINE, CERVICAL REGION: Chronic | ICD-10-CM

## 2023-08-22 DIAGNOSIS — S62.101A FRACTURE OF UNSPECIFIED CARPAL BONE, RIGHT WRIST, INITIAL ENCOUNTER FOR CLOSED FRACTURE: Chronic | ICD-10-CM

## 2023-08-22 PROCEDURE — 99213 OFFICE O/P EST LOW 20 MIN: CPT | Mod: 25

## 2023-08-22 PROCEDURE — G0463: CPT | Mod: 25

## 2023-08-22 PROCEDURE — 11042 DBRDMT SUBQ TIS 1ST 20SQCM/<: CPT | Mod: 58

## 2023-08-22 RX ORDER — LISINOPRIL 2.5 MG/1
1 TABLET ORAL
Refills: 0 | DISCHARGE

## 2023-08-22 RX ORDER — FUROSEMIDE 40 MG
1 TABLET ORAL
Refills: 0 | DISCHARGE

## 2023-08-22 RX ORDER — INSULIN GLARGINE 100 [IU]/ML
20 INJECTION, SOLUTION SUBCUTANEOUS
Refills: 0 | DISCHARGE

## 2023-08-22 RX ORDER — ROSUVASTATIN CALCIUM 5 MG/1
1 TABLET ORAL
Refills: 0 | DISCHARGE

## 2023-08-22 RX ORDER — METFORMIN HYDROCHLORIDE 850 MG/1
1 TABLET ORAL
Refills: 0 | DISCHARGE

## 2023-08-22 RX ORDER — CARVEDILOL PHOSPHATE 80 MG/1
1 CAPSULE, EXTENDED RELEASE ORAL
Refills: 0 | DISCHARGE

## 2023-08-22 RX ORDER — ASPIRIN/CALCIUM CARB/MAGNESIUM 324 MG
1 TABLET ORAL
Refills: 0 | DISCHARGE

## 2023-08-22 RX ORDER — TICAGRELOR 90 MG/1
1 TABLET ORAL
Refills: 0 | DISCHARGE

## 2023-08-23 ENCOUNTER — APPOINTMENT (OUTPATIENT)
Dept: CARDIOLOGY | Facility: CLINIC | Age: 63
End: 2023-08-23
Payer: COMMERCIAL

## 2023-08-23 ENCOUNTER — APPOINTMENT (OUTPATIENT)
Dept: INTERNAL MEDICINE | Facility: CLINIC | Age: 63
End: 2023-08-23
Payer: COMMERCIAL

## 2023-08-23 ENCOUNTER — NON-APPOINTMENT (OUTPATIENT)
Age: 63
End: 2023-08-23

## 2023-08-23 VITALS
WEIGHT: 244 LBS | OXYGEN SATURATION: 98 % | TEMPERATURE: 97.6 F | SYSTOLIC BLOOD PRESSURE: 107 MMHG | DIASTOLIC BLOOD PRESSURE: 68 MMHG | HEART RATE: 85 BPM | RESPIRATION RATE: 14 BRPM | BODY MASS INDEX: 35.01 KG/M2

## 2023-08-23 VITALS
TEMPERATURE: 97.6 F | BODY MASS INDEX: 34.93 KG/M2 | DIASTOLIC BLOOD PRESSURE: 68 MMHG | RESPIRATION RATE: 14 BRPM | WEIGHT: 244 LBS | SYSTOLIC BLOOD PRESSURE: 107 MMHG | HEIGHT: 70 IN | OXYGEN SATURATION: 98 % | HEART RATE: 85 BPM

## 2023-08-23 DIAGNOSIS — L08.9 LOCAL INFECTION OF THE SKIN AND SUBCUTANEOUS TISSUE, UNSPECIFIED: ICD-10-CM

## 2023-08-23 PROBLEM — N18.30 CHRONIC KIDNEY DISEASE, STAGE 3 UNSPECIFIED: Chronic | Status: ACTIVE | Noted: 2023-08-04

## 2023-08-23 PROBLEM — I25.10 ATHEROSCLEROTIC HEART DISEASE OF NATIVE CORONARY ARTERY WITHOUT ANGINA PECTORIS: Chronic | Status: ACTIVE | Noted: 2023-08-04

## 2023-08-23 PROBLEM — I10 ESSENTIAL (PRIMARY) HYPERTENSION: Chronic | Status: ACTIVE | Noted: 2023-08-04

## 2023-08-23 PROBLEM — E11.9 TYPE 2 DIABETES MELLITUS WITHOUT COMPLICATIONS: Chronic | Status: ACTIVE | Noted: 2023-08-04

## 2023-08-23 PROCEDURE — 99214 OFFICE O/P EST MOD 30 MIN: CPT

## 2023-08-23 PROCEDURE — 93000 ELECTROCARDIOGRAM COMPLETE: CPT

## 2023-08-23 PROCEDURE — 99214 OFFICE O/P EST MOD 30 MIN: CPT | Mod: 25

## 2023-08-23 RX ORDER — ROSUVASTATIN CALCIUM 40 MG/1
40 TABLET, FILM COATED ORAL DAILY
Qty: 90 | Refills: 1 | Status: DISCONTINUED | COMMUNITY
Start: 2019-03-27 | End: 2023-08-23

## 2023-08-23 RX ORDER — METFORMIN HYDROCHLORIDE 500 MG/1
500 TABLET, COATED ORAL
Qty: 90 | Refills: 1 | Status: DISCONTINUED | COMMUNITY
Start: 2022-01-21 | End: 2023-08-23

## 2023-08-23 RX ORDER — FUROSEMIDE 40 MG/1
40 TABLET ORAL DAILY
Qty: 90 | Refills: 1 | Status: DISCONTINUED | COMMUNITY
Start: 2019-05-01 | End: 2023-08-23

## 2023-08-23 NOTE — PLAN
[FreeTextEntry1] : Follow-up  Left foot wound infection s/p Ix D  on IV abx Ertapenem 1 gm IV for total 6 weeks/ last dose September 13  cont wound vac cont local wound care  ID follow up    CKD  Nephrology referral - awaiting for appt  will monitor renal function   HTN/HLD/CAD Follows with Dr. Quispe  Low sodium, low cholesterol diet/ increased physical activity Cont Coreg 3.125 mg twice daily cont Lisinopril 20 mg daily cont Lasix 40 mg daily cont Brilinta 60 mg twice daily Atorvastatin 80 mg QHS  Diabetes Reinforced Low carb, low sugar diet/ increased physical activity cont to  monitor blood sugars at home   Lantus 30 unit qhs  Cont Glipizide 10 mg daily  cont Humalog SS endo referral

## 2023-08-23 NOTE — HISTORY OF PRESENT ILLNESS
[FreeTextEntry1] : Jp is s/p hospitalization for osteomyelitis and also had PTCA popliteal and ant tibial artery. Now with wound vac and PICC line for antibiotics.  Changed to atorvastatin 80mg and brilinta 90mg. Otherwise improving. Wife involved. Patient not always compliant.

## 2023-08-23 NOTE — HISTORY OF PRESENT ILLNESS
[de-identified] : Mr. RENEE MOSQUERA is a 62-year-old male with hx. of Diabetes/Hypertension/ Hyperlipidemia / CAD with stents who presents for a follow up  after recent hospitalization for left-sided diabetic foot wound and sepsis. During hospitalization MRI noted with osteomyelitis within the lateral aspect of the base of the fifth metatarsal with adjacent soft tissue ulcer.  Infection disease was consulted status post bone biopsy.  Wound culture showed ESBL.  Status post OR with podiatry with I&D and bone biopsy on August 8, no graft placed given residual infection.  As per ID plan for PICC and  6 weeks IV antibiotic with Ertapenem 1 g daily until September 13, 2023.  Patient also underwent angiogram on August 10 with angioplasty popliteal and AT.  Cardiology was consulted during the admission given patient chronic heart failure.  Repeat transthoracic echo showed EF 49% and multiple segmental abnormalities.  Patient was discharged with recommendation  to follow-up with ID Dr. Mckeon Pt. currently following with wound care and has wound VAC to his left foot. He reports  good glycemic control at home.  He denies any chest pain, shortness of breath, dizziness, abdominal pain.

## 2023-08-23 NOTE — DISCUSSION/SUMMARY
[FreeTextEntry1] : The patient is a 62-year-old gentleman DM, HTN, HLD, CAD EF=40% with osteomyelitis of left foot and s/p PTCA left leg. #1 CV-- c/w brilinta , pharm stress fixed defects 1/23, ECHO EF=35-40%, lasix 40mg daily #2 HTN- c/w coreg and lisinopril 20mg but change to Entresto next visit. #3 HLD- changed to atorvastatin 80mg in hospital, change back to rosuvastatin 40mg when completes current rx.  #4 DM- on lantus, metformin, suboptimal control, consider ozempic and farxiga, appt with Endo #5 APC- c/w coreg #6 Vasc- osteo left foot, wound vac, PICC line, s/p PTCA left popliteal, anterior tibial artery, change brilinta back to 90mg bid.  [EKG obtained to assist in diagnosis and management of assessed problem(s)] : EKG obtained to assist in diagnosis and management of assessed problem(s)

## 2023-08-23 NOTE — PHYSICAL EXAM
[No Acute Distress] : no acute distress [Well Nourished] : well nourished [Well Developed] : well developed [Well-Appearing] : well-appearing [Normal Sclera/Conjunctiva] : normal sclera/conjunctiva [PERRL] : pupils equal round and reactive to light [EOMI] : extraocular movements intact [Normal Outer Ear/Nose] : the outer ears and nose were normal in appearance [Normal Oropharynx] : the oropharynx was normal [No JVD] : no jugular venous distention [No Lymphadenopathy] : no lymphadenopathy [Supple] : supple [No Respiratory Distress] : no respiratory distress  [No Accessory Muscle Use] : no accessory muscle use [Clear to Auscultation] : lungs were clear to auscultation bilaterally [Normal Rate] : normal rate  [Regular Rhythm] : with a regular rhythm [Normal S1, S2] : normal S1 and S2 [No Murmur] : no murmur heard [Pedal Pulses Present] : the pedal pulses are present [No Edema] : there was no peripheral edema [Soft] : abdomen soft [Non Tender] : non-tender [Non-distended] : non-distended [No Masses] : no abdominal mass palpated [Normal Bowel Sounds] : normal bowel sounds [Normal Posterior Cervical Nodes] : no posterior cervical lymphadenopathy [Normal Anterior Cervical Nodes] : no anterior cervical lymphadenopathy [No CVA Tenderness] : no CVA  tenderness [No Spinal Tenderness] : no spinal tenderness [Grossly Normal Strength/Tone] : grossly normal strength/tone [No Rash] : no rash [No Focal Deficits] : no focal deficits [Deep Tendon Reflexes (DTR)] : deep tendon reflexes were 2+ and symmetric [Normal Affect] : the affect was normal [Normal Insight/Judgement] : insight and judgment were intact [de-identified] : left foot wound/ dressing in place / Larm PICC in place  [de-identified] : ambulates with assistance

## 2023-08-29 ENCOUNTER — APPOINTMENT (OUTPATIENT)
Dept: WOUND CARE | Facility: HOSPITAL | Age: 63
End: 2023-08-29
Payer: MEDICAID

## 2023-08-29 ENCOUNTER — OUTPATIENT (OUTPATIENT)
Dept: OUTPATIENT SERVICES | Facility: HOSPITAL | Age: 63
LOS: 1 days | End: 2023-08-29
Payer: MEDICAID

## 2023-08-29 ENCOUNTER — APPOINTMENT (OUTPATIENT)
Dept: WOUND CARE | Facility: HOSPITAL | Age: 63
End: 2023-08-29

## 2023-08-29 VITALS
RESPIRATION RATE: 16 BRPM | BODY MASS INDEX: 34.79 KG/M2 | OXYGEN SATURATION: 100 % | WEIGHT: 243 LBS | DIASTOLIC BLOOD PRESSURE: 65 MMHG | HEART RATE: 60 BPM | TEMPERATURE: 97.7 F | HEIGHT: 70 IN | SYSTOLIC BLOOD PRESSURE: 101 MMHG

## 2023-08-29 DIAGNOSIS — Z98.890 OTHER SPECIFIED POSTPROCEDURAL STATES: Chronic | ICD-10-CM

## 2023-08-29 DIAGNOSIS — M43.22 FUSION OF SPINE, CERVICAL REGION: Chronic | ICD-10-CM

## 2023-08-29 PROCEDURE — 11042 DBRDMT SUBQ TIS 1ST 20SQCM/<: CPT

## 2023-08-29 NOTE — HISTORY OF PRESENT ILLNESS
[FreeTextEntry1] : Patient present s/p incision and drainage` of the left foot as diabetic with ulceration. patient had bone biopsy while in [facility]. Patient on PICC line with abx as per ID. Patient currnetly using wound vacc. patient relates no fever, chills, ns ,nausea or calf tenderness. Patient last HBa1c is 9.7

## 2023-08-29 NOTE — ASSESSMENT
[FreeTextEntry1] : spent 30 min initial consultation with patient --aseptic debridemont of ulceration left foot with demal currette and 15 blade down to level of sub q and not beyond with cholohex scub and santyl and dsd  --continue with iv abx as per ID --hospital pathology is negative for osteomylitis --will continue to monitor esr, crp and cbc w diff --continue with wound vacc tx --f/u 1 week

## 2023-09-01 ENCOUNTER — APPOINTMENT (OUTPATIENT)
Dept: ENDOCRINOLOGY | Facility: CLINIC | Age: 63
End: 2023-09-01

## 2023-09-04 NOTE — REVIEW OF SYSTEMS
[All other systems negative] : All other systems negative Spiral Flap Text: The defect edges were debeveled with a #15 scalpel blade.  Given the location of the defect, shape of the defect and the proximity to free margins a spiral flap was deemed most appropriate.  Using a sterile surgical marker, an appropriate rotation flap was drawn incorporating the defect and placing the expected incisions within the relaxed skin tension lines where possible. The area thus outlined was incised deep to adipose tissue with a #15 scalpel blade.  The skin margins were undermined to an appropriate distance in all directions utilizing iris scissors.

## 2023-09-05 ENCOUNTER — APPOINTMENT (OUTPATIENT)
Dept: WOUND CARE | Facility: HOSPITAL | Age: 63
End: 2023-09-05
Payer: COMMERCIAL

## 2023-09-05 ENCOUNTER — APPOINTMENT (OUTPATIENT)
Dept: ENDOCRINOLOGY | Facility: CLINIC | Age: 63
End: 2023-09-05
Payer: COMMERCIAL

## 2023-09-05 ENCOUNTER — APPOINTMENT (OUTPATIENT)
Dept: WOUND CARE | Facility: HOSPITAL | Age: 63
End: 2023-09-05

## 2023-09-05 VITALS
SYSTOLIC BLOOD PRESSURE: 115 MMHG | TEMPERATURE: 97.8 F | RESPIRATION RATE: 16 BRPM | HEART RATE: 71 BPM | DIASTOLIC BLOOD PRESSURE: 63 MMHG | OXYGEN SATURATION: 100 %

## 2023-09-05 VITALS
SYSTOLIC BLOOD PRESSURE: 102 MMHG | OXYGEN SATURATION: 98 % | RESPIRATION RATE: 16 BRPM | DIASTOLIC BLOOD PRESSURE: 65 MMHG | WEIGHT: 240 LBS | BODY MASS INDEX: 34.36 KG/M2 | HEIGHT: 70 IN | HEART RATE: 87 BPM

## 2023-09-05 PROCEDURE — 11042 DBRDMT SUBQ TIS 1ST 20SQCM/<: CPT

## 2023-09-05 PROCEDURE — 99204 OFFICE O/P NEW MOD 45 MIN: CPT

## 2023-09-05 NOTE — PHYSICAL EXAM
[Alert] : alert [Healthy Appearance] : healthy appearance [Well Nourished] : well nourished [No Acute Distress] : no acute distress [Well Developed] : well developed [Normal Sclera/Conjunctiva] : normal sclera/conjunctiva [No Proptosis] : no proptosis [No Neck Mass] : no neck mass was observed [No LAD] : no lymphadenopathy [Supple] : the neck was supple [Thyroid Not Enlarged] : the thyroid was not enlarged [No Thyroid Nodules] : no palpable thyroid nodules [No Respiratory Distress] : no respiratory distress [Normal Affect] : the affect was normal [Normal Insight/Judgement] : insight and judgment were intact [Normal Mood] : the mood was normal [de-identified] : Left foot in dressing [de-identified] : Declined goes to podiatry and wound care

## 2023-09-05 NOTE — ASSESSMENT
[FreeTextEntry1] : This is a 62 year old male with type 2 diabetes mellitus with nephropathy and possible neuropathy, CAD/stent, hypertension, hyperlipidemia, CKD, anemia, osteomatosis/ left foot gangrene, PVD s/p PTCA popliteal and anterior tibial artery, vitamin D insufficiency, here for consultation.  Diabetes was diagnosed in his 40s.  He is on glipizide ER 10 mg daily, Lantus 20 units at bedtime, metformin  mg once a day. He was on Jardiance on the past.  Blood work from August 2023 showed hbA1c 9.6%.  For now, discontinue glipizide to prevent fasting hypoglycemia. LDL 64 Avoid SGLT2 inhibitor due to gangrene/ulcer of foot. May be a good candidate for GLP1 receptor agonist if he does not have retinopathy.   Check hbA1c.  Last ophthalmology exam was August 2023. Unclear if he has retinopathy. Per patient he has floaters and had laser and possible intraocular injection.  Contact Dr. Londono to see if he has retinopathy. If no retinopathy will start GLP1 receptor agonist.  GI side effects reviewed. No history of pancreatitis. No personal or family history of thyroid cancer.  For now continue to Lantus and metformin.  Will return to office for freestyle james pro CGM placement.  Has nephropathy. Check urine microalbumin with next visit. Has neuropathy.  He is on atorvastatin 80 mg daily. He will soon start rosuvastatin 40 mg daily. Check lipid panel with next visit. He is on lisinopril 20 mg daily.  Check vitamin B12 with next visit.

## 2023-09-05 NOTE — HISTORY OF PRESENT ILLNESS
[FreeTextEntry1] : He is accompanied by his wife.  CC: Diabetes This is a 62 year old male with type 2 diabetes mellitus with nephropathy and possible neuropathy, CAD/stent, hypertension, hyperlipidemia, CKD, anemia, osteomatosis/ left foot gangrene, PVD s/p PTCA popliteal and anterior tibial artery, vitamin D insufficiency, here for consultation.  Diabetes was diagnosed in his 40s.  He is on glipizide ER 10 mg daily, Lantus 20 units at bedtime, metformin  mg once a day. He was on Jardiance on the past.  Self monitors blood glucose once a day. 70-120s. Denies hypoglycemia Last ophthalmology exam was August 2023. Unclear if he has retinopathy. Per patient he has floaters and had laser and possible intraocular injection.  Has nephropathy.  Has neuropathy.  He is on atorvastatin 80 mg daily. He will soon start rosuvastatin 40 mg daily.  He is on lisinopril 20 mg daily.

## 2023-09-05 NOTE — ADDENDUM
[FreeTextEntry1] : By signing my name below, I, Gadiel Lopez, attest that this document has been prepared under the direction and in the presence of Dr. Waite.  I, Donnie Waite MD, personally performed the services described in this documentation. All medical record entries made by the scribe were at my discretion and in my presence. I have reviewed the chart and discharge instructions (if applicable) and agree that the record reflects my personal permanence and is accurate and complete.

## 2023-09-05 NOTE — QUALITY MEASURES
[Visual inspection, sensory exam] : Foot exam, including visual inspection, sensory exam with mono filament, and pulse exam, was performed within the last 12 months none

## 2023-09-06 ENCOUNTER — APPOINTMENT (OUTPATIENT)
Dept: INTERNAL MEDICINE | Facility: CLINIC | Age: 63
End: 2023-09-06
Payer: COMMERCIAL

## 2023-09-06 VITALS
HEART RATE: 82 BPM | RESPIRATION RATE: 12 BRPM | DIASTOLIC BLOOD PRESSURE: 70 MMHG | SYSTOLIC BLOOD PRESSURE: 98 MMHG | BODY MASS INDEX: 34.36 KG/M2 | WEIGHT: 240 LBS | OXYGEN SATURATION: 98 % | HEIGHT: 70 IN

## 2023-09-06 DIAGNOSIS — L08.9 TYPE 2 DIABETES MELLITUS WITH OTHER SKIN COMPLICATIONS: ICD-10-CM

## 2023-09-06 DIAGNOSIS — E11.628 TYPE 2 DIABETES MELLITUS WITH OTHER SKIN COMPLICATIONS: ICD-10-CM

## 2023-09-06 DIAGNOSIS — N18.32 CHRONIC KIDNEY DISEASE, STAGE 3B: ICD-10-CM

## 2023-09-06 PROCEDURE — 99214 OFFICE O/P EST MOD 30 MIN: CPT

## 2023-09-06 RX ORDER — INSULIN GLARGINE 100 [IU]/ML
100 INJECTION, SOLUTION SUBCUTANEOUS
Qty: 1 | Refills: 2 | Status: ACTIVE | COMMUNITY
Start: 2021-05-05 | End: 1900-01-01

## 2023-09-08 LAB
ANION GAP SERPL CALC-SCNC: 22 MMOL/L
BUN SERPL-MCNC: 19 MG/DL
CALCIUM SERPL-MCNC: 9.4 MG/DL
CHLORIDE SERPL-SCNC: 101 MMOL/L
CO2 SERPL-SCNC: 15 MMOL/L
CREAT SERPL-MCNC: 1.37 MG/DL
EGFR: 58 ML/MIN/1.73M2
ESTIMATED AVERAGE GLUCOSE: 160 MG/DL
GLUCOSE SERPL-MCNC: 41 MG/DL
HBA1C MFR BLD HPLC: 7.2 %
POTASSIUM SERPL-SCNC: 4.6 MMOL/L
SODIUM SERPL-SCNC: 139 MMOL/L

## 2023-09-14 DIAGNOSIS — E11.9 TYPE 2 DIABETES MELLITUS WITHOUT COMPLICATIONS: ICD-10-CM

## 2023-09-14 DIAGNOSIS — E11.69 TYPE 2 DIABETES MELLITUS WITH OTHER SPECIFIED COMPLICATION: ICD-10-CM

## 2023-09-14 DIAGNOSIS — E66.9 OBESITY, UNSPECIFIED: ICD-10-CM

## 2023-09-14 DIAGNOSIS — L97.522 NON-PRESSURE CHRONIC ULCER OF OTHER PART OF LEFT FOOT WITH FAT LAYER EXPOSED: ICD-10-CM

## 2023-09-19 ENCOUNTER — APPOINTMENT (OUTPATIENT)
Dept: WOUND CARE | Facility: HOSPITAL | Age: 63
End: 2023-09-19

## 2023-09-19 ENCOUNTER — OUTPATIENT (OUTPATIENT)
Dept: OUTPATIENT SERVICES | Facility: HOSPITAL | Age: 63
LOS: 1 days | End: 2023-09-19
Payer: MEDICAID

## 2023-09-19 ENCOUNTER — APPOINTMENT (OUTPATIENT)
Dept: WOUND CARE | Facility: HOSPITAL | Age: 63
End: 2023-09-19
Payer: MEDICAID

## 2023-09-19 DIAGNOSIS — S62.101A FRACTURE OF UNSPECIFIED CARPAL BONE, RIGHT WRIST, INITIAL ENCOUNTER FOR CLOSED FRACTURE: Chronic | ICD-10-CM

## 2023-09-19 DIAGNOSIS — M43.22 FUSION OF SPINE, CERVICAL REGION: Chronic | ICD-10-CM

## 2023-09-19 DIAGNOSIS — Z98.890 OTHER SPECIFIED POSTPROCEDURAL STATES: Chronic | ICD-10-CM

## 2023-09-19 PROCEDURE — 11042 DBRDMT SUBQ TIS 1ST 20SQCM/<: CPT | Mod: 79

## 2023-09-26 ENCOUNTER — APPOINTMENT (OUTPATIENT)
Dept: WOUND CARE | Facility: HOSPITAL | Age: 63
End: 2023-09-26
Payer: COMMERCIAL

## 2023-09-26 PROCEDURE — 15275 SKIN SUB GRAFT FACE/NK/HF/G: CPT

## 2023-10-05 ENCOUNTER — APPOINTMENT (OUTPATIENT)
Dept: INFECTIOUS DISEASE | Facility: CLINIC | Age: 63
End: 2023-10-05
Payer: COMMERCIAL

## 2023-10-05 ENCOUNTER — OUTPATIENT (OUTPATIENT)
Dept: OUTPATIENT SERVICES | Facility: HOSPITAL | Age: 63
LOS: 1 days | End: 2023-10-05
Payer: MEDICAID

## 2023-10-05 ENCOUNTER — NON-APPOINTMENT (OUTPATIENT)
Age: 63
End: 2023-10-05

## 2023-10-05 VITALS
HEIGHT: 70 IN | SYSTOLIC BLOOD PRESSURE: 102 MMHG | WEIGHT: 230 LBS | HEART RATE: 92 BPM | OXYGEN SATURATION: 98 % | DIASTOLIC BLOOD PRESSURE: 65 MMHG | TEMPERATURE: 97.8 F | BODY MASS INDEX: 32.93 KG/M2

## 2023-10-05 DIAGNOSIS — M86.9 OSTEOMYELITIS, UNSPECIFIED: ICD-10-CM

## 2023-10-05 DIAGNOSIS — E11.69 TYPE 2 DIABETES MELLITUS WITH OTHER SPECIFIED COMPLICATION: ICD-10-CM

## 2023-10-05 DIAGNOSIS — M43.22 FUSION OF SPINE, CERVICAL REGION: Chronic | ICD-10-CM

## 2023-10-05 DIAGNOSIS — E11.628 TYPE 2 DIABETES MELLITUS WITH OTHER SKIN COMPLICATIONS: ICD-10-CM

## 2023-10-05 DIAGNOSIS — E11.9 TYPE 2 DIABETES MELLITUS WITHOUT COMPLICATIONS: ICD-10-CM

## 2023-10-05 DIAGNOSIS — S62.101A FRACTURE OF UNSPECIFIED CARPAL BONE, RIGHT WRIST, INITIAL ENCOUNTER FOR CLOSED FRACTURE: Chronic | ICD-10-CM

## 2023-10-05 DIAGNOSIS — B97.89 OTHER VIRAL AGENTS AS THE CAUSE OF DISEASES CLASSIFIED ELSEWHERE: ICD-10-CM

## 2023-10-05 DIAGNOSIS — Z98.890 OTHER SPECIFIED POSTPROCEDURAL STATES: Chronic | ICD-10-CM

## 2023-10-05 PROCEDURE — G0463: CPT

## 2023-10-05 PROCEDURE — 99214 OFFICE O/P EST MOD 30 MIN: CPT

## 2023-10-05 PROCEDURE — 99243 OFF/OP CNSLTJ NEW/EST LOW 30: CPT

## 2023-10-06 ENCOUNTER — APPOINTMENT (OUTPATIENT)
Dept: INTERNAL MEDICINE | Facility: CLINIC | Age: 63
End: 2023-10-06
Payer: COMMERCIAL

## 2023-10-06 VITALS
SYSTOLIC BLOOD PRESSURE: 100 MMHG | OXYGEN SATURATION: 94 % | HEIGHT: 70 IN | TEMPERATURE: 97.4 F | HEART RATE: 93 BPM | DIASTOLIC BLOOD PRESSURE: 63 MMHG | RESPIRATION RATE: 12 BRPM | WEIGHT: 230 LBS | BODY MASS INDEX: 32.93 KG/M2

## 2023-10-06 PROCEDURE — 99214 OFFICE O/P EST MOD 30 MIN: CPT | Mod: 25

## 2023-10-06 PROCEDURE — 90682 RIV4 VACC RECOMBINANT DNA IM: CPT

## 2023-10-06 PROCEDURE — 90471 IMMUNIZATION ADMIN: CPT

## 2023-10-06 RX ORDER — MOMETASONE 50 UG/1
50 SPRAY, METERED NASAL DAILY
Qty: 1 | Refills: 0 | Status: DISCONTINUED | COMMUNITY
Start: 2019-02-22 | End: 2023-10-06

## 2023-10-06 RX ORDER — INSULIN LISPRO 100 [IU]/ML
100 INJECTION, SOLUTION INTRAVENOUS; SUBCUTANEOUS
Qty: 1 | Refills: 1 | Status: DISCONTINUED | COMMUNITY
Start: 2023-06-24 | End: 2023-10-06

## 2023-10-06 RX ORDER — ALBUTEROL SULFATE 90 UG/1
108 (90 BASE) AEROSOL, METERED RESPIRATORY (INHALATION) EVERY 4 HOURS
Qty: 1 | Refills: 1 | Status: DISCONTINUED | COMMUNITY
Start: 2020-05-08 | End: 2023-10-06

## 2023-10-06 RX ORDER — ATORVASTATIN CALCIUM 80 MG/1
80 TABLET, FILM COATED ORAL
Qty: 90 | Refills: 0 | Status: DISCONTINUED | COMMUNITY
Start: 2023-08-23 | End: 2023-10-06

## 2023-10-06 RX ORDER — GABAPENTIN 100 MG/1
100 CAPSULE ORAL
Qty: 7 | Refills: 0 | Status: DISCONTINUED | COMMUNITY
Start: 2021-04-28 | End: 2023-10-06

## 2023-10-06 RX ORDER — TADALAFIL 20 MG/1
20 TABLET ORAL
Qty: 10 | Refills: 0 | Status: DISCONTINUED | COMMUNITY
Start: 2022-08-01 | End: 2023-10-06

## 2023-10-06 RX ORDER — MELOXICAM 7.5 MG/1
7.5 TABLET ORAL DAILY
Qty: 7 | Refills: 0 | Status: DISCONTINUED | COMMUNITY
Start: 2021-04-28 | End: 2023-10-06

## 2023-10-10 ENCOUNTER — OUTPATIENT (OUTPATIENT)
Dept: OUTPATIENT SERVICES | Facility: HOSPITAL | Age: 63
LOS: 1 days | End: 2023-10-10
Payer: MEDICAID

## 2023-10-10 ENCOUNTER — APPOINTMENT (OUTPATIENT)
Dept: WOUND CARE | Facility: HOSPITAL | Age: 63
End: 2023-10-10
Payer: MEDICAID

## 2023-10-10 DIAGNOSIS — S62.101A FRACTURE OF UNSPECIFIED CARPAL BONE, RIGHT WRIST, INITIAL ENCOUNTER FOR CLOSED FRACTURE: Chronic | ICD-10-CM

## 2023-10-10 PROCEDURE — 11042 DBRDMT SUBQ TIS 1ST 20SQCM/<: CPT | Mod: 79

## 2023-10-12 RX ORDER — LINAGLIPTIN 5 MG/1
5 TABLET, FILM COATED ORAL DAILY
Qty: 1 | Refills: 2 | Status: DISCONTINUED | COMMUNITY
Start: 2023-10-04 | End: 2023-10-12

## 2023-10-17 ENCOUNTER — NON-APPOINTMENT (OUTPATIENT)
Age: 63
End: 2023-10-17

## 2023-10-23 ENCOUNTER — RX RENEWAL (OUTPATIENT)
Age: 63
End: 2023-10-23

## 2023-10-24 ENCOUNTER — OUTPATIENT (OUTPATIENT)
Dept: OUTPATIENT SERVICES | Facility: HOSPITAL | Age: 63
LOS: 1 days | End: 2023-10-24
Payer: SELF-PAY

## 2023-10-24 ENCOUNTER — APPOINTMENT (OUTPATIENT)
Dept: WOUND CARE | Facility: HOSPITAL | Age: 63
End: 2023-10-24
Payer: MEDICAID

## 2023-10-24 DIAGNOSIS — M43.22 FUSION OF SPINE, CERVICAL REGION: Chronic | ICD-10-CM

## 2023-10-24 DIAGNOSIS — Z98.890 OTHER SPECIFIED POSTPROCEDURAL STATES: Chronic | ICD-10-CM

## 2023-10-24 PROCEDURE — 15275 SKIN SUB GRAFT FACE/NK/HF/G: CPT

## 2023-10-31 DIAGNOSIS — E11.9 TYPE 2 DIABETES MELLITUS WITHOUT COMPLICATIONS: ICD-10-CM

## 2023-10-31 DIAGNOSIS — I73.9 PERIPHERAL VASCULAR DISEASE, UNSPECIFIED: ICD-10-CM

## 2023-10-31 DIAGNOSIS — L97.522 NON-PRESSURE CHRONIC ULCER OF OTHER PART OF LEFT FOOT WITH FAT LAYER EXPOSED: ICD-10-CM

## 2023-11-03 DIAGNOSIS — I73.9 PERIPHERAL VASCULAR DISEASE, UNSPECIFIED: ICD-10-CM

## 2023-11-03 DIAGNOSIS — L97.522 NON-PRESSURE CHRONIC ULCER OF OTHER PART OF LEFT FOOT WITH FAT LAYER EXPOSED: ICD-10-CM

## 2023-11-03 DIAGNOSIS — L08.9 LOCAL INFECTION OF THE SKIN AND SUBCUTANEOUS TISSUE, UNSPECIFIED: ICD-10-CM

## 2023-11-03 DIAGNOSIS — E11.628 TYPE 2 DIABETES MELLITUS WITH OTHER SKIN COMPLICATIONS: ICD-10-CM

## 2023-11-03 DIAGNOSIS — E11.9 TYPE 2 DIABETES MELLITUS WITHOUT COMPLICATIONS: ICD-10-CM

## 2023-11-07 ENCOUNTER — OUTPATIENT (OUTPATIENT)
Dept: OUTPATIENT SERVICES | Facility: HOSPITAL | Age: 63
LOS: 1 days | End: 2023-11-07
Payer: MEDICAID

## 2023-11-07 ENCOUNTER — APPOINTMENT (OUTPATIENT)
Dept: WOUND CARE | Facility: HOSPITAL | Age: 63
End: 2023-11-07
Payer: MEDICAID

## 2023-11-07 DIAGNOSIS — M43.22 FUSION OF SPINE, CERVICAL REGION: Chronic | ICD-10-CM

## 2023-11-07 DIAGNOSIS — Z98.890 OTHER SPECIFIED POSTPROCEDURAL STATES: Chronic | ICD-10-CM

## 2023-11-07 DIAGNOSIS — S62.101A FRACTURE OF UNSPECIFIED CARPAL BONE, RIGHT WRIST, INITIAL ENCOUNTER FOR CLOSED FRACTURE: Chronic | ICD-10-CM

## 2023-11-07 PROCEDURE — 15275 SKIN SUB GRAFT FACE/NK/HF/G: CPT

## 2023-11-08 ENCOUNTER — APPOINTMENT (OUTPATIENT)
Dept: INTERNAL MEDICINE | Facility: CLINIC | Age: 63
End: 2023-11-08
Payer: MEDICAID

## 2023-11-08 VITALS
DIASTOLIC BLOOD PRESSURE: 68 MMHG | HEIGHT: 70 IN | WEIGHT: 235 LBS | RESPIRATION RATE: 12 BRPM | HEART RATE: 77 BPM | BODY MASS INDEX: 33.64 KG/M2 | SYSTOLIC BLOOD PRESSURE: 100 MMHG | OXYGEN SATURATION: 98 %

## 2023-11-08 LAB
ALBUMIN SERPL ELPH-MCNC: 4.5 G/DL
ALP BLD-CCNC: 114 U/L
ALT SERPL-CCNC: 12 U/L
ANION GAP SERPL CALC-SCNC: 13 MMOL/L
AST SERPL-CCNC: 13 U/L
BILIRUB SERPL-MCNC: 0.5 MG/DL
BUN SERPL-MCNC: 24 MG/DL
CALCIUM SERPL-MCNC: 10 MG/DL
CHLORIDE SERPL-SCNC: 104 MMOL/L
CO2 SERPL-SCNC: 23 MMOL/L
CREAT SERPL-MCNC: 1.89 MG/DL
EGFR: 39 ML/MIN/1.73M2
GLUCOSE SERPL-MCNC: 130 MG/DL
POTASSIUM SERPL-SCNC: 4.5 MMOL/L
PROT SERPL-MCNC: 7.9 G/DL
SODIUM SERPL-SCNC: 140 MMOL/L

## 2023-11-08 PROCEDURE — 99214 OFFICE O/P EST MOD 30 MIN: CPT

## 2023-11-08 RX ORDER — ROSUVASTATIN CALCIUM 40 MG/1
40 TABLET, FILM COATED ORAL DAILY
Qty: 90 | Refills: 3 | Status: ACTIVE | COMMUNITY
Start: 2023-08-23 | End: 1900-01-01

## 2023-11-10 ENCOUNTER — APPOINTMENT (OUTPATIENT)
Dept: NEPHROLOGY | Facility: CLINIC | Age: 63
End: 2023-11-10
Payer: MEDICAID

## 2023-11-10 VITALS
HEART RATE: 92 BPM | OXYGEN SATURATION: 100 % | TEMPERATURE: 98.6 F | WEIGHT: 235 LBS | SYSTOLIC BLOOD PRESSURE: 106 MMHG | DIASTOLIC BLOOD PRESSURE: 64 MMHG | BODY MASS INDEX: 33.64 KG/M2 | HEIGHT: 70 IN

## 2023-11-10 PROCEDURE — 99213 OFFICE O/P EST LOW 20 MIN: CPT

## 2023-11-16 PROBLEM — N18.32 STAGE 3B CHRONIC KIDNEY DISEASE: Status: ACTIVE | Noted: 2021-06-07

## 2023-11-16 PROBLEM — E11.628 DIABETIC INFECTION OF LEFT FOOT: Status: ACTIVE | Noted: 2023-08-23

## 2023-11-21 ENCOUNTER — APPOINTMENT (OUTPATIENT)
Dept: WOUND CARE | Facility: HOSPITAL | Age: 63
End: 2023-11-21
Payer: MEDICAID

## 2023-11-21 DIAGNOSIS — L97.522 NON-PRESSURE CHRONIC ULCER OF OTHER PART OF LEFT FOOT WITH FAT LAYER EXPOSED: ICD-10-CM

## 2023-11-21 DIAGNOSIS — I73.9 PERIPHERAL VASCULAR DISEASE, UNSPECIFIED: ICD-10-CM

## 2023-11-21 DIAGNOSIS — E11.9 TYPE 2 DIABETES MELLITUS WITHOUT COMPLICATIONS: ICD-10-CM

## 2023-11-21 PROCEDURE — XXXXX: CPT | Mod: 1L

## 2023-11-27 DIAGNOSIS — E11.40 TYPE 2 DIABETES MELLITUS WITH DIABETIC NEUROPATHY, UNSPECIFIED: ICD-10-CM

## 2023-11-27 DIAGNOSIS — L97.522 NON-PRESSURE CHRONIC ULCER OF OTHER PART OF LEFT FOOT WITH FAT LAYER EXPOSED: ICD-10-CM

## 2023-11-27 DIAGNOSIS — E11.621 TYPE 2 DIABETES MELLITUS WITH FOOT ULCER: ICD-10-CM

## 2023-11-27 DIAGNOSIS — L97.509 NON-PRESSURE CHRONIC ULCER OF OTHER PART OF UNSPECIFIED FOOT WITH UNSPECIFIED SEVERITY: ICD-10-CM

## 2023-11-27 DIAGNOSIS — N18.9 CHRONIC KIDNEY DISEASE, UNSPECIFIED: ICD-10-CM

## 2023-12-05 ENCOUNTER — APPOINTMENT (OUTPATIENT)
Dept: WOUND CARE | Facility: HOSPITAL | Age: 63
End: 2023-12-05
Payer: MEDICAID

## 2023-12-05 ENCOUNTER — OUTPATIENT (OUTPATIENT)
Dept: OUTPATIENT SERVICES | Facility: HOSPITAL | Age: 63
LOS: 1 days | End: 2023-12-05
Payer: MEDICAID

## 2023-12-05 DIAGNOSIS — M43.22 FUSION OF SPINE, CERVICAL REGION: Chronic | ICD-10-CM

## 2023-12-05 DIAGNOSIS — Z98.890 OTHER SPECIFIED POSTPROCEDURAL STATES: Chronic | ICD-10-CM

## 2023-12-05 DIAGNOSIS — S62.101A FRACTURE OF UNSPECIFIED CARPAL BONE, RIGHT WRIST, INITIAL ENCOUNTER FOR CLOSED FRACTURE: Chronic | ICD-10-CM

## 2023-12-05 PROCEDURE — 11042 DBRDMT SUBQ TIS 1ST 20SQCM/<: CPT

## 2023-12-12 ENCOUNTER — APPOINTMENT (OUTPATIENT)
Dept: ENDOCRINOLOGY | Facility: CLINIC | Age: 63
End: 2023-12-12
Payer: MEDICAID

## 2023-12-12 ENCOUNTER — APPOINTMENT (OUTPATIENT)
Dept: NEPHROLOGY | Facility: CLINIC | Age: 63
End: 2023-12-12
Payer: MEDICAID

## 2023-12-12 ENCOUNTER — LABORATORY RESULT (OUTPATIENT)
Age: 63
End: 2023-12-12

## 2023-12-12 VITALS
HEIGHT: 70 IN | RESPIRATION RATE: 14 BRPM | HEART RATE: 88 BPM | WEIGHT: 238 LBS | OXYGEN SATURATION: 99 % | DIASTOLIC BLOOD PRESSURE: 75 MMHG | SYSTOLIC BLOOD PRESSURE: 122 MMHG | BODY MASS INDEX: 34.07 KG/M2

## 2023-12-12 VITALS
DIASTOLIC BLOOD PRESSURE: 75 MMHG | WEIGHT: 238 LBS | OXYGEN SATURATION: 98 % | HEART RATE: 85 BPM | SYSTOLIC BLOOD PRESSURE: 129 MMHG | TEMPERATURE: 97.8 F | HEIGHT: 70 IN | BODY MASS INDEX: 34.07 KG/M2

## 2023-12-12 DIAGNOSIS — D64.9 ANEMIA, UNSPECIFIED: ICD-10-CM

## 2023-12-12 PROCEDURE — 99213 OFFICE O/P EST LOW 20 MIN: CPT

## 2023-12-12 PROCEDURE — 99214 OFFICE O/P EST MOD 30 MIN: CPT

## 2023-12-12 RX ORDER — CIPROFLOXACIN HYDROCHLORIDE 500 MG/1
500 TABLET, FILM COATED ORAL TWICE DAILY
Qty: 14 | Refills: 1 | Status: COMPLETED | COMMUNITY
Start: 2023-10-10 | End: 2023-12-12

## 2023-12-12 RX ORDER — CIPROFLOXACIN HYDROCHLORIDE 500 MG/1
500 TABLET, FILM COATED ORAL TWICE DAILY
Qty: 14 | Refills: 1 | Status: COMPLETED | COMMUNITY
Start: 2023-11-21 | End: 2023-12-12

## 2023-12-13 ENCOUNTER — NON-APPOINTMENT (OUTPATIENT)
Age: 63
End: 2023-12-13

## 2023-12-13 LAB
ALBUMIN SERPL ELPH-MCNC: 4.1 G/DL
ALP BLD-CCNC: 96 U/L
ALT SERPL-CCNC: 10 U/L
ANION GAP SERPL CALC-SCNC: 13 MMOL/L
APPEARANCE: ABNORMAL
AST SERPL-CCNC: 18 U/L
BILIRUB SERPL-MCNC: 0.4 MG/DL
BILIRUBIN URINE: NEGATIVE
BLOOD URINE: NEGATIVE
BUN SERPL-MCNC: 24 MG/DL
CALCIUM SERPL-MCNC: 9.4 MG/DL
CHLORIDE SERPL-SCNC: 102 MMOL/L
CO2 SERPL-SCNC: 23 MMOL/L
COLOR: YELLOW
CREAT SERPL-MCNC: 1.62 MG/DL
CREAT SPEC-SCNC: 247 MG/DL
CREAT/PROT UR: 0.8 RATIO
EGFR: 47 ML/MIN/1.73M2
ESTIMATED AVERAGE GLUCOSE: 180 MG/DL
GLUCOSE QUALITATIVE U: 100 MG/DL
GLUCOSE SERPL-MCNC: 98 MG/DL
HBA1C MFR BLD HPLC: 7.9 %
HCT VFR BLD CALC: 38.9 %
HGB BLD-MCNC: 11.7 G/DL
KETONES URINE: ABNORMAL MG/DL
LEUKOCYTE ESTERASE URINE: NEGATIVE
MCHC RBC-ENTMCNC: 24.8 PG
MCHC RBC-ENTMCNC: 30.1 GM/DL
MCV RBC AUTO: 82.4 FL
NITRITE URINE: NEGATIVE
PH URINE: 5.5
PLATELET # BLD AUTO: 302 K/UL
POTASSIUM SERPL-SCNC: 5 MMOL/L
PROT SERPL-MCNC: 7.8 G/DL
PROT UR-MCNC: 199 MG/DL
PROTEIN URINE: 100 MG/DL
RBC # BLD: 4.72 M/UL
RBC # FLD: 14.4 %
SODIUM SERPL-SCNC: 138 MMOL/L
SPECIFIC GRAVITY URINE: 1.03
UROBILINOGEN URINE: 1 MG/DL
WBC # FLD AUTO: 9.04 K/UL

## 2023-12-13 RX ORDER — PEN NEEDLE, DIABETIC 29 G X1/2"
31G X 5 MM NEEDLE, DISPOSABLE MISCELLANEOUS
Qty: 1 | Refills: 3 | Status: ACTIVE | COMMUNITY
Start: 2021-05-07 | End: 1900-01-01

## 2023-12-19 ENCOUNTER — APPOINTMENT (OUTPATIENT)
Dept: WOUND CARE | Facility: HOSPITAL | Age: 63
End: 2023-12-19
Payer: MEDICAID

## 2023-12-19 ENCOUNTER — OUTPATIENT (OUTPATIENT)
Dept: OUTPATIENT SERVICES | Facility: HOSPITAL | Age: 63
LOS: 1 days | End: 2023-12-19
Payer: MEDICAID

## 2023-12-19 DIAGNOSIS — S62.101A FRACTURE OF UNSPECIFIED CARPAL BONE, RIGHT WRIST, INITIAL ENCOUNTER FOR CLOSED FRACTURE: Chronic | ICD-10-CM

## 2023-12-19 DIAGNOSIS — M43.22 FUSION OF SPINE, CERVICAL REGION: Chronic | ICD-10-CM

## 2023-12-19 DIAGNOSIS — E11.40 TYPE 2 DIABETES MELLITUS WITH DIABETIC NEUROPATHY, UNSPECIFIED: ICD-10-CM

## 2023-12-19 DIAGNOSIS — Z98.890 OTHER SPECIFIED POSTPROCEDURAL STATES: Chronic | ICD-10-CM

## 2023-12-19 PROCEDURE — 11042 DBRDMT SUBQ TIS 1ST 20SQCM/<: CPT

## 2023-12-19 NOTE — ASSESSMENT
[FreeTextEntry1] : This is a 62 year old male with type 2 diabetes mellitus with nephropathy and possible neuropathy, CAD/stent, hypertension, hyperlipidemia, CKD, anemia, osteomatosis, PVD s/p PTCA popliteal and anterior tibial artery, vitamin D insufficiency, here for follow-up.  He is on Lantus 20 units at bedtime, Januvia 25 mg daily, metformin  mg once a day. He was on Jardiance and glipizide on the past.  He will have blood work with nephrologist today.  Check hemoglobin A1c, CMP. Avoid SGLT2 inhibitor due to ulcer of foot. Avoid GLP1 receptor agonist due to active retinopathy.  Last ophthalmology exam was July 2023. Per ophthalmologist note he has active preoperative diabetic retinopathy, s/p laser and intraocular injections.  For now continue to Lantus, Januvia, and metformin. Has nephropathy. Will check lipid panel with nephrology.  Has neuropathy. He is on rosuvastatin 40 mg daily. Check lipid panel.  He is on lisinopril 5 mg daily.  Check vitamin B12 as he is on metformin.

## 2023-12-19 NOTE — PHYSICAL EXAM
[Alert] : alert [Well Nourished] : well nourished [Healthy Appearance] : healthy appearance [Obese] : obese [No Acute Distress] : no acute distress [Well Developed] : well developed [Normal Voice/Communication] : normal voice communication [Normal Sclera/Conjunctiva] : normal sclera/conjunctiva [No Proptosis] : no proptosis [No Neck Mass] : no neck mass was observed [No LAD] : no lymphadenopathy [Supple] : the neck was supple [Thyroid Not Enlarged] : the thyroid was not enlarged [No Thyroid Nodules] : no palpable thyroid nodules [No Respiratory Distress] : no respiratory distress [Normal Affect] : the affect was normal [Normal Insight/Judgement] : insight and judgment were intact [Normal Mood] : the mood was normal [de-identified] : Left foot in boot.

## 2023-12-19 NOTE — HISTORY OF PRESENT ILLNESS
[FreeTextEntry1] : He is accompanied by his wife.  CC: Diabetes This is a 62 year old male with type 2 diabetes mellitus with nephropathy and possible neuropathy, CAD/stent, hypertension, hyperlipidemia, CKD, anemia, osteomyelitis, PVD s/p PTCA popliteal and anterior tibial artery, vitamin D insufficiency, here for follow-up.  Diabetes was diagnosed in his 40s.  He is on Lantus 20 units at bedtime, Januvia 25 mg daily, and metformin  mg once a day. He was on Jardiance and glipizide on the past.  Self monitors blood glucose once a day. 108-183s. Denies hypoglycemia Last ophthalmology exam was July 2023. Per ophthalmologist note he has active preoperative diabetic retinopathy s/p laser and intraocular injections.  Has nephropathy.  Has neuropathy.  He is on rosuvastatin 40 mg daily.  He is on lisinopril 5 mg daily.

## 2023-12-26 NOTE — HISTORY OF PRESENT ILLNESS
[FreeTextEntry1] : Patient present s/p incision and drainage` of the left foot as diabetic with ulceration. patient had bone biopsy while in [facility]. Patient on PICC line with abx as per ID. Patient currnetly using wound vacc. patient relates no fever, chills, ns ,nausea or calf tenderness. Patient last HBa1c is 9.7. Patien had kerecis graft applied

## 2023-12-26 NOTE — ASSESSMENT
[FreeTextEntry1] : spent 30 min f/u consultation with patient --aseptic debridemont of ulceration left foot with demal currette and 15 blade down to level of sub q and not beyond with cholohex scub and application of forrest and topical e02 --hospital pathology is negative for osteomylitis --Patient is continue with topical EO to dressing changes down to wound veil 3 times a week by home nursing. --f/u 2 week, will apply kerecis next visit visit.pending insurance approval

## 2023-12-28 DIAGNOSIS — I73.9 PERIPHERAL VASCULAR DISEASE, UNSPECIFIED: ICD-10-CM

## 2023-12-28 DIAGNOSIS — E11.9 TYPE 2 DIABETES MELLITUS WITHOUT COMPLICATIONS: ICD-10-CM

## 2023-12-28 DIAGNOSIS — L97.522 NON-PRESSURE CHRONIC ULCER OF OTHER PART OF LEFT FOOT WITH FAT LAYER EXPOSED: ICD-10-CM

## 2023-12-28 DIAGNOSIS — E11.40 TYPE 2 DIABETES MELLITUS WITH DIABETIC NEUROPATHY, UNSPECIFIED: ICD-10-CM

## 2024-01-02 ENCOUNTER — APPOINTMENT (OUTPATIENT)
Dept: WOUND CARE | Facility: HOSPITAL | Age: 64
End: 2024-01-02
Payer: MEDICAID

## 2024-01-02 ENCOUNTER — OUTPATIENT (OUTPATIENT)
Dept: OUTPATIENT SERVICES | Facility: HOSPITAL | Age: 64
LOS: 1 days | End: 2024-01-02
Payer: MEDICAID

## 2024-01-02 VITALS
BODY MASS INDEX: 33.64 KG/M2 | HEART RATE: 84 BPM | WEIGHT: 235 LBS | TEMPERATURE: 98.1 F | RESPIRATION RATE: 18 BRPM | OXYGEN SATURATION: 96 % | SYSTOLIC BLOOD PRESSURE: 123 MMHG | DIASTOLIC BLOOD PRESSURE: 78 MMHG | HEIGHT: 70 IN

## 2024-01-02 DIAGNOSIS — M43.22 FUSION OF SPINE, CERVICAL REGION: Chronic | ICD-10-CM

## 2024-01-02 DIAGNOSIS — S62.101A FRACTURE OF UNSPECIFIED CARPAL BONE, RIGHT WRIST, INITIAL ENCOUNTER FOR CLOSED FRACTURE: Chronic | ICD-10-CM

## 2024-01-02 DIAGNOSIS — Z98.890 OTHER SPECIFIED POSTPROCEDURAL STATES: Chronic | ICD-10-CM

## 2024-01-02 PROCEDURE — 11042 DBRDMT SUBQ TIS 1ST 20SQCM/<: CPT

## 2024-01-02 NOTE — ASSESSMENT
[FreeTextEntry1] : spent 30 min f/u consultation with patient. --aseptic debridement of ulceration left foot with dermal curette and 15 blade down to level of sub q and not beyond with cholohex scrub and application of forrest and topical e02 --hospital pathology is negative for osteomyelitis --Patient is continue with topical EO to dressing changes down to wound veil 3 times a week by home nursing. --f/u 2 week, will apply kerecis next visit visit pending insurance approval

## 2024-01-02 NOTE — HISTORY OF PRESENT ILLNESS
[FreeTextEntry1] : Patient present s/p incision and drainage` of the left foot as diabetic with ulceration. patient had bone biopsy while in [facility]. Patient on PICC line with abx as per ID. Patient currnetly using wound vacc. patient relates no fever, chills, ns ,nausea or calf tenderness. Patient last HBa1c is 9.7. Patien had kerecis graft applied.  1/2/24 Morning FBS: 156mg/dL

## 2024-01-05 DIAGNOSIS — E11.621 TYPE 2 DIABETES MELLITUS WITH FOOT ULCER: ICD-10-CM

## 2024-01-05 DIAGNOSIS — E11.40 TYPE 2 DIABETES MELLITUS WITH DIABETIC NEUROPATHY, UNSPECIFIED: ICD-10-CM

## 2024-01-05 DIAGNOSIS — L97.509 NON-PRESSURE CHRONIC ULCER OF OTHER PART OF UNSPECIFIED FOOT WITH UNSPECIFIED SEVERITY: ICD-10-CM

## 2024-01-17 ENCOUNTER — APPOINTMENT (OUTPATIENT)
Dept: INTERNAL MEDICINE | Facility: CLINIC | Age: 64
End: 2024-01-17
Payer: MEDICAID

## 2024-01-17 VITALS
RESPIRATION RATE: 16 BRPM | WEIGHT: 238 LBS | SYSTOLIC BLOOD PRESSURE: 119 MMHG | BODY MASS INDEX: 34.07 KG/M2 | OXYGEN SATURATION: 96 % | HEIGHT: 70 IN | HEART RATE: 83 BPM | DIASTOLIC BLOOD PRESSURE: 77 MMHG | TEMPERATURE: 98.3 F

## 2024-01-17 DIAGNOSIS — J40 BRONCHITIS, NOT SPECIFIED AS ACUTE OR CHRONIC: ICD-10-CM

## 2024-01-17 PROCEDURE — 99214 OFFICE O/P EST MOD 30 MIN: CPT

## 2024-01-17 RX ORDER — TADALAFIL 5 MG/1
5 TABLET ORAL
Qty: 30 | Refills: 11 | Status: DISCONTINUED | OUTPATIENT
Start: 2022-10-25 | End: 2024-01-17

## 2024-01-17 RX ORDER — PROMETHAZINE HYDROCHLORIDE AND DEXTROMETHORPHAN HYDROBROMIDE ORAL SOLUTION 15; 6.25 MG/5ML; MG/5ML
6.25-15 SOLUTION ORAL
Qty: 150 | Refills: 0 | Status: COMPLETED | COMMUNITY
Start: 2024-01-17 | End: 2024-01-22

## 2024-01-18 NOTE — PLAN
[FreeTextEntry1] : see plan   HTN/HLD/CAD Follows with Dr. Quispe  Low sodium, low cholesterol diet/ increased physical activity Cont Coreg 3.125 mg twice daily cont Lisinopril 20 mg daily cont Lasix 40 mg daily cont Brilinta 60 mg twice daily Atorvastatin 40 mg QHS  Diabetes Reinforced Low carb, low sugar diet/ increased physical activity cont to  monitor blood sugars at home   Lantus 20 unit qhs  Metformin 500 mg QD Januvia 25 mg QD cont Humalog SS endo folow up

## 2024-01-18 NOTE — PHYSICAL EXAM
[No Acute Distress] : no acute distress [Normal Sclera/Conjunctiva] : normal sclera/conjunctiva [EOMI] : extraocular movements intact [Normal Outer Ear/Nose] : the outer ears and nose were normal in appearance [Normal Oropharynx] : the oropharynx was normal [No Lymphadenopathy] : no lymphadenopathy [No JVD] : no jugular venous distention [Supple] : supple [Thyroid Normal, No Nodules] : the thyroid was normal and there were no nodules present [No Respiratory Distress] : no respiratory distress  [No Accessory Muscle Use] : no accessory muscle use [Normal Rate] : normal rate  [Regular Rhythm] : with a regular rhythm [Normal S1, S2] : normal S1 and S2 [No Murmur] : no murmur heard [Pedal Pulses Present] : the pedal pulses are present [No Edema] : there was no peripheral edema [No Extremity Clubbing/Cyanosis] : no extremity clubbing/cyanosis [Soft] : abdomen soft [Non Tender] : non-tender [Non-distended] : non-distended [No Masses] : no abdominal mass palpated [Normal Bowel Sounds] : normal bowel sounds [No CVA Tenderness] : no CVA  tenderness [No Spinal Tenderness] : no spinal tenderness [No Joint Swelling] : no joint swelling [Grossly Normal Strength/Tone] : grossly normal strength/tone [No Rash] : no rash [Coordination Grossly Intact] : coordination grossly intact [No Focal Deficits] : no focal deficits [Normal Gait] : normal gait [Normal Affect] : the affect was normal [Normal Insight/Judgement] : insight and judgment were intact [de-identified] : + scattered rhonchi [de-identified] : left foot dressing in place

## 2024-01-18 NOTE — HISTORY OF PRESENT ILLNESS
[de-identified] : Mr. RENEE MOSQUERA is a 63-year-old male, accompanied by his wife, with hx. of anemia, chronic foot ulcer, CKD, CAD, DM II, HTN, HLD, left foot OM / diabetic ulcer / s/p left foot debridement who presents for a follow up visit, Rx refills.  Pt c/o cough productive for x5 days >symptoms started after a recent trip to [place]. He denies fever, chills, SOB He reports fair glycemic control at home He is following up with wound care for his left foot wound / the next wound care appt 1/23

## 2024-01-18 NOTE — REVIEW OF SYSTEMS
[Wheezing] : no wheezing [Shortness Of Breath] : no shortness of breath [Cough] : cough [Negative] : Heme/Lymph

## 2024-01-22 ENCOUNTER — RX RENEWAL (OUTPATIENT)
Age: 64
End: 2024-01-22

## 2024-01-22 RX ORDER — TICAGRELOR 90 MG/1
90 TABLET ORAL
Qty: 180 | Refills: 1 | Status: ACTIVE | COMMUNITY
Start: 2023-08-23 | End: 1900-01-01

## 2024-01-25 PROBLEM — E11.40 TYPE 2 DIABETES MELLITUS WITH DIABETIC NEUROPATHY, WITHOUT LONG-TERM CURRENT USE OF INSULIN: Status: ACTIVE | Noted: 2023-11-18

## 2024-01-30 ENCOUNTER — APPOINTMENT (OUTPATIENT)
Dept: WOUND CARE | Facility: HOSPITAL | Age: 64
End: 2024-01-30

## 2024-01-30 ENCOUNTER — APPOINTMENT (OUTPATIENT)
Dept: WOUND CARE | Facility: HOSPITAL | Age: 64
End: 2024-01-30
Payer: MEDICAID

## 2024-01-30 ENCOUNTER — OUTPATIENT (OUTPATIENT)
Dept: OUTPATIENT SERVICES | Facility: HOSPITAL | Age: 64
LOS: 1 days | End: 2024-01-30
Payer: MEDICAID

## 2024-01-30 DIAGNOSIS — L97.522 NON-PRESSURE CHRONIC ULCER OF OTHER PART OF LEFT FOOT WITH FAT LAYER EXPOSED: ICD-10-CM

## 2024-01-30 DIAGNOSIS — M43.22 FUSION OF SPINE, CERVICAL REGION: Chronic | ICD-10-CM

## 2024-01-30 DIAGNOSIS — I73.9 PERIPHERAL VASCULAR DISEASE, UNSPECIFIED: ICD-10-CM

## 2024-01-30 DIAGNOSIS — E11.40 TYPE 2 DIABETES MELLITUS WITH DIABETIC NEUROPATHY, UNSPECIFIED: ICD-10-CM

## 2024-01-30 DIAGNOSIS — S62.101A FRACTURE OF UNSPECIFIED CARPAL BONE, RIGHT WRIST, INITIAL ENCOUNTER FOR CLOSED FRACTURE: Chronic | ICD-10-CM

## 2024-01-30 PROCEDURE — 11042 DBRDMT SUBQ TIS 1ST 20SQCM/<: CPT

## 2024-02-06 ENCOUNTER — APPOINTMENT (OUTPATIENT)
Dept: INTERNAL MEDICINE | Facility: CLINIC | Age: 64
End: 2024-02-06
Payer: MEDICAID

## 2024-02-06 VITALS
HEART RATE: 82 BPM | SYSTOLIC BLOOD PRESSURE: 111 MMHG | OXYGEN SATURATION: 97 % | BODY MASS INDEX: 35.01 KG/M2 | DIASTOLIC BLOOD PRESSURE: 71 MMHG | WEIGHT: 244 LBS | TEMPERATURE: 97.1 F | RESPIRATION RATE: 14 BRPM

## 2024-02-06 DIAGNOSIS — I25.10 ATHEROSCLEROTIC HEART DISEASE OF NATIVE CORONARY ARTERY W/OUT ANGINA PECTORIS: ICD-10-CM

## 2024-02-06 PROBLEM — I73.9 PERIPHERAL VASCULAR DISEASE OF EXTREMITY: Status: ACTIVE | Noted: 2023-08-23

## 2024-02-06 PROCEDURE — 99214 OFFICE O/P EST MOD 30 MIN: CPT

## 2024-02-06 NOTE — HISTORY OF PRESENT ILLNESS
[FreeTextEntry1] : Patient present s/p incision and drainage` of the left foot as diabetic with ulceration. patient had bone biopsy while in [facility]. Patient on PICC line with abx as per ID. Patient currnetly using wound vacc. patient relates no fever, chills, ns ,nausea or calf tenderness. Patient last HBa1c is 9.7. Patien had kerecis graft applied.  1/2/24 Morning FBS: 156mg/dL

## 2024-02-09 DIAGNOSIS — L97.522 NON-PRESSURE CHRONIC ULCER OF OTHER PART OF LEFT FOOT WITH FAT LAYER EXPOSED: ICD-10-CM

## 2024-02-09 DIAGNOSIS — I73.9 PERIPHERAL VASCULAR DISEASE, UNSPECIFIED: ICD-10-CM

## 2024-02-09 DIAGNOSIS — E11.9 TYPE 2 DIABETES MELLITUS WITHOUT COMPLICATIONS: ICD-10-CM

## 2024-02-12 LAB
ALBUMIN SERPL ELPH-MCNC: 4.3 G/DL
ALP BLD-CCNC: 91 U/L
ALT SERPL-CCNC: 12 U/L
ANION GAP SERPL CALC-SCNC: 11 MMOL/L
AST SERPL-CCNC: 17 U/L
BASOPHILS # BLD AUTO: 0.1 K/UL
BASOPHILS NFR BLD AUTO: 1.1 %
BILIRUB SERPL-MCNC: 0.5 MG/DL
BUN SERPL-MCNC: 21 MG/DL
CALCIUM SERPL-MCNC: 9.7 MG/DL
CHLORIDE SERPL-SCNC: 105 MMOL/L
CHOLEST SERPL-MCNC: 117 MG/DL
CO2 SERPL-SCNC: 25 MMOL/L
CREAT SERPL-MCNC: 1.63 MG/DL
EGFR: 47 ML/MIN/1.73M2
EOSINOPHIL # BLD AUTO: 0.27 K/UL
EOSINOPHIL NFR BLD AUTO: 2.8 %
ESTIMATED AVERAGE GLUCOSE: 192 MG/DL
GLUCOSE SERPL-MCNC: 102 MG/DL
HBA1C MFR BLD HPLC: 8.3 %
HCT VFR BLD CALC: 38.7 %
HDLC SERPL-MCNC: 32 MG/DL
HGB BLD-MCNC: 12.1 G/DL
IMM GRANULOCYTES NFR BLD AUTO: 0.2 %
LDLC SERPL CALC-MCNC: 62 MG/DL
LYMPHOCYTES # BLD AUTO: 2.9 K/UL
LYMPHOCYTES NFR BLD AUTO: 30.5 %
MAN DIFF?: NORMAL
MCHC RBC-ENTMCNC: 25 PG
MCHC RBC-ENTMCNC: 31.3 GM/DL
MCV RBC AUTO: 80 FL
MONOCYTES # BLD AUTO: 0.61 K/UL
MONOCYTES NFR BLD AUTO: 6.4 %
NEUTROPHILS # BLD AUTO: 5.61 K/UL
NEUTROPHILS NFR BLD AUTO: 59 %
NONHDLC SERPL-MCNC: 86 MG/DL
PLATELET # BLD AUTO: 282 K/UL
POTASSIUM SERPL-SCNC: 4.7 MMOL/L
PROT SERPL-MCNC: 7.7 G/DL
RBC # BLD: 4.84 M/UL
RBC # FLD: 15.1 %
SODIUM SERPL-SCNC: 140 MMOL/L
TRIGL SERPL-MCNC: 129 MG/DL
WBC # FLD AUTO: 9.51 K/UL

## 2024-02-12 RX ORDER — BLOOD-GLUCOSE,RECEIVER,CONT
EACH MISCELLANEOUS
Qty: 1 | Refills: 0 | Status: ACTIVE | COMMUNITY
Start: 2024-02-06 | End: 1900-01-01

## 2024-02-13 NOTE — HISTORY OF PRESENT ILLNESS
[de-identified] : Mr. RENEE MOSQUERA is a 63-year-old male, accompanied by his wife, with hx. of anemia, chronic foot ulcer, CKD, CAD, DM II, HTN, HLD, left foot OM / diabetic ulcer / s/p left foot debridement who presents for a follow up visit.  Pt reports that his wife has been helping him with changing the dressing every other day with gauze and bacitracin s/p incision and drainage` of the left foot as diabetic with ulceration. He claims the wound is healing well and doing much better. He reports that his at home sugar levels are typically around 160. Patient is interested in getting a Dexcom glucose monitor.  Denies any exertional chest pain, dyspnea, palpitations, headaches, and dizziness.  He is otherwise fine and offers no additional complaints.

## 2024-02-13 NOTE — END OF VISIT
[FreeTextEntry3] :    Documented by Issa Alvarado acting as a scribe under Dr. Mcmullen. 02/06/2024

## 2024-02-13 NOTE — PLAN
[FreeTextEntry1] : see plan   HTN/HLD/CAD Follows with Dr. Quispe  Low sodium, low cholesterol diet/ increased physical activity Cont Coreg 3.125 mg twice daily cont Lisinopril 20 mg daily cont Lasix 40 mg daily cont Brilinta 60 mg twice daily Atorvastatin 40 mg QHS  Diabetes Reinforced Low carb, low sugar diet/ increased physical activity cont to  monitor blood sugars at home   Lantus 20 unit qhs  Metformin 500 mg QD Januvia 25 mg QD cont Humalog SS endo folow up continue seeing wound care for diabetic foot ulcer

## 2024-02-13 NOTE — PHYSICAL EXAM
[No Acute Distress] : no acute distress [Normal Sclera/Conjunctiva] : normal sclera/conjunctiva [EOMI] : extraocular movements intact [Normal Outer Ear/Nose] : the outer ears and nose were normal in appearance [Normal Oropharynx] : the oropharynx was normal [No JVD] : no jugular venous distention [No Lymphadenopathy] : no lymphadenopathy [Supple] : supple [Thyroid Normal, No Nodules] : the thyroid was normal and there were no nodules present [No Respiratory Distress] : no respiratory distress  [No Accessory Muscle Use] : no accessory muscle use [Normal Rate] : normal rate  [Regular Rhythm] : with a regular rhythm [Normal S1, S2] : normal S1 and S2 [No Murmur] : no murmur heard [Pedal Pulses Present] : the pedal pulses are present [No Edema] : there was no peripheral edema [Soft] : abdomen soft [Non Tender] : non-tender [Non-distended] : non-distended [No Masses] : no abdominal mass palpated [Normal Bowel Sounds] : normal bowel sounds [No CVA Tenderness] : no CVA  tenderness [No Spinal Tenderness] : no spinal tenderness [No Joint Swelling] : no joint swelling [Grossly Normal Strength/Tone] : grossly normal strength/tone [No Rash] : no rash [Coordination Grossly Intact] : coordination grossly intact [No Focal Deficits] : no focal deficits [Normal Gait] : normal gait [Normal Affect] : the affect was normal [Normal Insight/Judgement] : insight and judgment were intact [de-identified] : + scattered rhonchi [Clear to Auscultation] : lungs were clear to auscultation bilaterally [de-identified] : left foot dressing in place

## 2024-02-20 ENCOUNTER — APPOINTMENT (OUTPATIENT)
Dept: WOUND CARE | Facility: HOSPITAL | Age: 64
End: 2024-02-20
Payer: MEDICAID

## 2024-02-20 ENCOUNTER — OUTPATIENT (OUTPATIENT)
Dept: OUTPATIENT SERVICES | Facility: HOSPITAL | Age: 64
LOS: 1 days | End: 2024-02-20
Payer: MEDICAID

## 2024-02-20 DIAGNOSIS — S62.101A FRACTURE OF UNSPECIFIED CARPAL BONE, RIGHT WRIST, INITIAL ENCOUNTER FOR CLOSED FRACTURE: Chronic | ICD-10-CM

## 2024-02-20 DIAGNOSIS — M43.22 FUSION OF SPINE, CERVICAL REGION: Chronic | ICD-10-CM

## 2024-02-20 DIAGNOSIS — Z98.890 OTHER SPECIFIED POSTPROCEDURAL STATES: Chronic | ICD-10-CM

## 2024-02-20 DIAGNOSIS — L97.522 NON-PRESSURE CHRONIC ULCER OF OTHER PART OF LEFT FOOT WITH FAT LAYER EXPOSED: ICD-10-CM

## 2024-02-20 PROCEDURE — G0463: CPT

## 2024-02-20 PROCEDURE — 99213 OFFICE O/P EST LOW 20 MIN: CPT

## 2024-02-20 RX ORDER — SODIUM BICARBONATE 650 MG/1
650 TABLET ORAL TWICE DAILY
Qty: 180 | Refills: 0 | Status: ACTIVE | COMMUNITY
Start: 2023-09-08 | End: 1900-01-01

## 2024-02-22 ENCOUNTER — APPOINTMENT (OUTPATIENT)
Dept: INTERNAL MEDICINE | Facility: CLINIC | Age: 64
End: 2024-02-22
Payer: MEDICAID

## 2024-02-22 DIAGNOSIS — E78.5 HYPERLIPIDEMIA, UNSPECIFIED: ICD-10-CM

## 2024-02-22 DIAGNOSIS — R80.9 PROTEINURIA, UNSPECIFIED: ICD-10-CM

## 2024-02-22 PROCEDURE — 99214 OFFICE O/P EST MOD 30 MIN: CPT

## 2024-02-22 RX ORDER — SITAGLIPTIN 50 MG/1
50 TABLET, FILM COATED ORAL DAILY
Qty: 90 | Refills: 0 | Status: ACTIVE | COMMUNITY
Start: 2023-10-12 | End: 1900-01-01

## 2024-02-27 PROBLEM — L97.522 CHRONIC FOOT ULCER WITH FAT LAYER EXPOSED, LEFT: Status: ACTIVE | Noted: 2023-08-29

## 2024-02-27 NOTE — ASSESSMENT
[FreeTextEntry1] : spent 30 min f/u consultation with patient. --aseptic debridement of ulceration left foot with dermal curette and 15 blade down to epidernis with bacitracin applied --hospital pathology is negative for osteomyelitis --Patient to f/u at private office for continued routine dm foot care. Patient educated on importance of continued dm foot care and dm sugar control with f/u with endocrinologist

## 2024-02-27 NOTE — HISTORY OF PRESENT ILLNESS
[FreeTextEntry1] : Patient present s/p incision and drainage` of the left foot as diabetic with ulceration. patient had bone biopsy while in [facility]. Patient on PICC line with abx as per ID. Patient currnetly using wound vacc. patient relates no fever, chills, ns ,nausea or calf tenderness. Patient last HBa1c is 9.7. Patien had kerecis graft applied.Patient recently returned from [place]  1/2/24 Morning FBS: 156mg/dL

## 2024-02-29 DIAGNOSIS — E11.621 TYPE 2 DIABETES MELLITUS WITH FOOT ULCER: ICD-10-CM

## 2024-02-29 DIAGNOSIS — L97.522 NON-PRESSURE CHRONIC ULCER OF OTHER PART OF LEFT FOOT WITH FAT LAYER EXPOSED: ICD-10-CM

## 2024-02-29 DIAGNOSIS — L97.509 NON-PRESSURE CHRONIC ULCER OF OTHER PART OF UNSPECIFIED FOOT WITH UNSPECIFIED SEVERITY: ICD-10-CM

## 2024-03-31 PROBLEM — R80.9 PROTEINURIA: Status: ACTIVE | Noted: 2021-05-05

## 2024-03-31 NOTE — PLAN
[FreeTextEntry1] : see plan   Hyperlipidemia Low LDL Increase activities  CKD/ Proteinuria nephrology follow up  HTN/HLD/CAD Follows with Dr. Quispe  Low sodium, low cholesterol diet/ increased physical activity Cont Coreg 3.125 mg twice daily cont Lisinopril 20 mg daily cont Lasix 40 mg daily cont Brilinta 60 mg twice daily Atorvastatin 40 mg QHS  Diabetes Reinforced Low carb, low sugar diet/ increased physical activity cont to  monitor blood sugars at home  Lantus 20 unit qhs  Metformin 500 mg QD increase Januvia 25 mg QD to 50 mg cont Humalog SS endo folow up continue seeing wound care for diabetic foot ulcer

## 2024-03-31 NOTE — END OF VISIT
[FreeTextEntry3] :    Documented by Issa Alvarado acting as a scribe under Dr. Mcmullen. 02/22/2024

## 2024-03-31 NOTE — HISTORY OF PRESENT ILLNESS
[Home] : at home, [unfilled] , at the time of the visit. [Medical Office: ([facility])___] : at the medical office located in  [de-identified] : Mr. RENEE MOSQUERA is a 63-year-old male, accompanied by his wife, with hx. of anemia, chronic foot ulcer, CKD, CAD, DM II, HTN, HLD, left foot OM / diabetic ulcer/ s/p left foot debridement who presents for a follow up on recent lab results.  Recent bloodwork showed that Hb A1C trending up. Pt's wife reports that he has been trying to monitor his diet and is unsure why his sugar levels have been rising. He has been compliant with Lantus 20, Metformin 500 MG, and Januvia 25 MG. He plans to follow up with his endocrinologist in April 2024.  Pt also has low LDL. He states that he has not been exercising frequently recently, and most of his daily activities are limited since he is currently undergoing PT. Pt also has low hemoglobin level. Had colonoscopy in 5 years and was reportedly normal. Has not had iron levels tested. Denies feeling dizzy, fatigued, N/V, F/C.

## 2024-04-02 ENCOUNTER — APPOINTMENT (OUTPATIENT)
Dept: ENDOCRINOLOGY | Facility: CLINIC | Age: 64
End: 2024-04-02
Payer: MEDICAID

## 2024-04-02 VITALS
OXYGEN SATURATION: 96 % | WEIGHT: 241 LBS | BODY MASS INDEX: 34.58 KG/M2 | HEART RATE: 84 BPM | TEMPERATURE: 97.5 F | RESPIRATION RATE: 14 BRPM | SYSTOLIC BLOOD PRESSURE: 118 MMHG | DIASTOLIC BLOOD PRESSURE: 69 MMHG

## 2024-04-02 DIAGNOSIS — E11.9 TYPE 2 DIABETES MELLITUS W/OUT COMPLICATIONS: ICD-10-CM

## 2024-04-02 PROCEDURE — 99213 OFFICE O/P EST LOW 20 MIN: CPT

## 2024-04-02 NOTE — PHYSICAL EXAM
[Well Developed] : well developed [No Acute Distress] : no acute distress [Well Nourished] : well nourished [Normal Conjunctiva] : normal conjunctiva [Normal Venous Pressure] : normal venous pressure [Normal S1, S2] : normal S1, S2 [No Carotid Bruit] : no carotid bruit [No Murmur] : no murmur [No Rub] : no rub [No Gallop] : no gallop [Clear Lung Fields] : clear lung fields [Good Air Entry] : good air entry [No Respiratory Distress] : no respiratory distress  [Non Tender] : non-tender [Soft] : abdomen soft [No Masses/organomegaly] : no masses/organomegaly [Normal Bowel Sounds] : normal bowel sounds [Normal Gait] : normal gait [No Cyanosis] : no cyanosis [No Edema] : no edema [No Varicosities] : no varicosities [No Clubbing] : no clubbing [No Rash] : no rash [Moves all extremities] : moves all extremities [No Skin Lesions] : no skin lesions [No Focal Deficits] : no focal deficits [Normal Speech] : normal speech [Normal memory] : normal memory [Alert and Oriented] : alert and oriented

## 2024-04-02 NOTE — PHYSICAL EXAM
[Alert] : alert [Well Nourished] : well nourished [Healthy Appearance] : healthy appearance [Obese] : obese [No Acute Distress] : no acute distress [Normal Voice/Communication] : normal voice communication [Well Developed] : well developed [Normal Sclera/Conjunctiva] : normal sclera/conjunctiva [No Proptosis] : no proptosis [No Neck Mass] : no neck mass was observed [No LAD] : no lymphadenopathy [Supple] : the neck was supple [Thyroid Not Enlarged] : the thyroid was not enlarged [No Thyroid Nodules] : no palpable thyroid nodules [No Respiratory Distress] : no respiratory distress [Normal Affect] : the affect was normal [Normal Insight/Judgement] : insight and judgment were intact [Normal Mood] : the mood was normal

## 2024-04-03 ENCOUNTER — NON-APPOINTMENT (OUTPATIENT)
Age: 64
End: 2024-04-03

## 2024-04-03 ENCOUNTER — APPOINTMENT (OUTPATIENT)
Dept: CARDIOLOGY | Facility: CLINIC | Age: 64
End: 2024-04-03
Payer: MEDICAID

## 2024-04-03 VITALS
WEIGHT: 241 LBS | RESPIRATION RATE: 14 BRPM | HEART RATE: 89 BPM | TEMPERATURE: 97.3 F | BODY MASS INDEX: 34.58 KG/M2 | DIASTOLIC BLOOD PRESSURE: 80 MMHG | OXYGEN SATURATION: 98 % | SYSTOLIC BLOOD PRESSURE: 130 MMHG

## 2024-04-03 DIAGNOSIS — N18.9 CHRONIC KIDNEY DISEASE, UNSPECIFIED: ICD-10-CM

## 2024-04-03 DIAGNOSIS — I49.1 ATRIAL PREMATURE DEPOLARIZATION: ICD-10-CM

## 2024-04-03 PROCEDURE — 93000 ELECTROCARDIOGRAM COMPLETE: CPT

## 2024-04-03 PROCEDURE — 99214 OFFICE O/P EST MOD 30 MIN: CPT | Mod: 25

## 2024-04-03 PROCEDURE — G2211 COMPLEX E/M VISIT ADD ON: CPT | Mod: NC,1L

## 2024-04-03 RX ORDER — BLOOD-GLUCOSE SENSOR
EACH MISCELLANEOUS
Qty: 3 | Refills: 2 | Status: ACTIVE | COMMUNITY
Start: 2024-02-06 | End: 1900-01-01

## 2024-04-03 NOTE — HISTORY OF PRESENT ILLNESS
[FreeTextEntry1] : Jp goes to PT twice a week and wife tries to get him to walk more when weather permits. He forgets alot of his meds and she has to monitor him closely. No symptoms. Lisinopril lowered and bp better

## 2024-04-03 NOTE — DISCUSSION/SUMMARY
[FreeTextEntry1] : The patient is a 63-year-old gentleman DM, HTN, HLD, CAD EF=40% with osteomyelitis of left foot and s/p PTCA left leg who is feeling better on lower dose lisinopril. #1 CV-- c/w brilinta , pharm stress fixed defects 1/23, ECHO EF=35-40%, lasix 40mg daily #2 HTN- c/w coreg and lisinopril 5mg, may not be able to tolerate entresto, #3 HLD- c/w rosuvastatin 40mg .  #4 DM- on lantus, metformin, januvia #5 APC- c/w coreg #6 Vasc- osteo left foot, wound vac, PICC line, s/p PTCA left popliteal, anterior tibial artery, increase ambulation daily. [EKG obtained to assist in diagnosis and management of assessed problem(s)] : EKG obtained to assist in diagnosis and management of assessed problem(s)

## 2024-04-03 NOTE — REVIEW OF SYSTEMS
[Weight Loss (___ Lbs)] : [unfilled] ~Ulb weight loss [SOB] : shortness of breath [Dyspnea on exertion] : dyspnea during exertion [Negative] : Heme/Lymph [Chest Discomfort] : no chest discomfort [Leg Claudication] : no intermittent leg claudication [Lower Ext Edema] : no extremity edema [Palpitations] : no palpitations [Orthopnea] : no orthopnea [Syncope] : no syncope [PND] : no PND

## 2024-04-10 ENCOUNTER — APPOINTMENT (OUTPATIENT)
Dept: NEPHROLOGY | Facility: CLINIC | Age: 64
End: 2024-04-10
Payer: MEDICAID

## 2024-04-10 ENCOUNTER — LABORATORY RESULT (OUTPATIENT)
Age: 64
End: 2024-04-10

## 2024-04-10 VITALS
BODY MASS INDEX: 34.36 KG/M2 | RESPIRATION RATE: 16 BRPM | DIASTOLIC BLOOD PRESSURE: 76 MMHG | HEART RATE: 86 BPM | OXYGEN SATURATION: 98 % | SYSTOLIC BLOOD PRESSURE: 124 MMHG | WEIGHT: 240 LBS | HEIGHT: 70 IN

## 2024-04-10 DIAGNOSIS — E11.621 TYPE 2 DIABETES MELLITUS WITH FOOT ULCER: ICD-10-CM

## 2024-04-10 DIAGNOSIS — L97.509 TYPE 2 DIABETES MELLITUS WITH FOOT ULCER: ICD-10-CM

## 2024-04-10 DIAGNOSIS — I10 ESSENTIAL (PRIMARY) HYPERTENSION: ICD-10-CM

## 2024-04-10 PROCEDURE — 99213 OFFICE O/P EST LOW 20 MIN: CPT

## 2024-04-10 NOTE — HISTORY OF PRESENT ILLNESS
[FreeTextEntry1] : Here for follow up. The patient has CKD G3a/A2 (proteinuria of on gram and eGFR of 47) and CAD w/ depressed EF of around 35 to 40%.  Last labs were done in Dr. Mcmullen's office.  His A1c was over 8.  He saw his Endocrinologist, Dr. Paul in early April.  SGLT2 were felt to be contraindicated because of the foot ulcer and the possible association w/ amputations.  Overall the patient feels well.  He is concern although he has physical therapy he is not regaining the strength and muscle mass he use to have. The foot ulcer has healed and no longer needs to be seen at the wound center.  He is here for a routine follow up.

## 2024-04-10 NOTE — PHYSICAL EXAM
[General Appearance - Alert] : alert [General Appearance - In No Acute Distress] : in no acute distress [Hearing Threshold Finger Rub Not [place]] : hearing was normal [Jugular Venous Distention Increased] : there was no jugular-venous distention [Auscultation Breath Sounds / Voice Sounds] : lungs were clear to auscultation bilaterally [Heart Sounds] : normal S1 and S2 [Heart Sounds Gallop] : no gallops [Systolic grade ___/6] : A grade [unfilled]/6 systolic murmur was heard. [Edema] : there was no peripheral edema [Abdomen Tenderness] : non-tender [No Spinal Tenderness] : no spinal tenderness [FreeTextEntry1] : Walk w/ a cane. Left foot w/ dressing.  Erythema of the toes. [] : no rash [No Focal Deficits] : no focal deficits [Oriented To Time, Place, And Person] : oriented to person, place, and time

## 2024-04-10 NOTE — ASSESSMENT
[FreeTextEntry1] : 1. CKD 3a/A2 from diabetic nephropathy. Discussed pros and cons of using SGLT2 inhibitor. Initial reports in the literature have linked these agents to an increase risk of limb amputation. More recent reports have no demonstrated an increased risk.  Given the high risk of progression of nephropathy and ischemic cardiomyopathy, I am in favor of using one of these agents which have demonstrated a clear survival advantage.  Willing to wait until foot wound is completed heal.  The patient understands the above issue and is willing to start the use of these agents. 2. Hypertension: well controlled. 3. Diabetes type II. Ai1 not in target. 4. Diabetic foot ulcer, almost completely healed.  Will discuss results of labs done today and the initiation of SGLT2 inhibitors in AM.

## 2024-04-11 PROBLEM — E11.9 DM2 (DIABETES MELLITUS, TYPE 2): Status: ACTIVE | Noted: 2022-10-25

## 2024-04-11 LAB
ALBUMIN SERPL ELPH-MCNC: 4.1 G/DL
ANION GAP SERPL CALC-SCNC: 12 MMOL/L
APPEARANCE: CLEAR
BILIRUBIN URINE: NEGATIVE
BLOOD URINE: ABNORMAL
BUN SERPL-MCNC: 26 MG/DL
CALCIUM SERPL-MCNC: 9.5 MG/DL
CHLORIDE SERPL-SCNC: 101 MMOL/L
CO2 SERPL-SCNC: 24 MMOL/L
COLOR: YELLOW
CREAT SERPL-MCNC: 1.8 MG/DL
CREAT SPEC-SCNC: 116 MG/DL
CREAT/PROT UR: 0.8 RATIO
EGFR: 42 ML/MIN/1.73M2
ESTIMATED AVERAGE GLUCOSE: 217 MG/DL
GLUCOSE QUALITATIVE U: >=1000 MG/DL
GLUCOSE SERPL-MCNC: 343 MG/DL
HBA1C MFR BLD HPLC: 9.2 %
KETONES URINE: NEGATIVE MG/DL
LEUKOCYTE ESTERASE URINE: NEGATIVE
NITRITE URINE: NEGATIVE
PH URINE: 5.5
PHOSPHATE SERPL-MCNC: 2.9 MG/DL
POTASSIUM SERPL-SCNC: 4.8 MMOL/L
PROT UR-MCNC: 93 MG/DL
PROTEIN URINE: 100 MG/DL
SODIUM SERPL-SCNC: 136 MMOL/L
SPECIFIC GRAVITY URINE: >1.03
UROBILINOGEN URINE: 0.2 MG/DL

## 2024-04-11 RX ORDER — METFORMIN HYDROCHLORIDE 500 MG/1
500 TABLET, FILM COATED, EXTENDED RELEASE ORAL TWICE DAILY
Qty: 180 | Refills: 1 | Status: ACTIVE | COMMUNITY
Start: 1900-01-01 | End: 1900-01-01

## 2024-04-11 NOTE — HISTORY OF PRESENT ILLNESS
[FreeTextEntry1] : He is accompanied by his wife.  CC: Diabetes This is a 63-year-old male with type 2 diabetes mellitus with nephropathy and possible neuropathy, CAD/stent, hypertension, hyperlipidemia, CKD, anemia, osteomyelitis, PVD s/p PTCA popliteal and anterior tibial artery, vitamin D insufficiency, here for follow-up.  Diabetes was diagnosed in his 40s.  He is on Lantus 20 units at bedtime, Januvia 50 mg daily, and metformin  mg once a day. He was on Jardiance and glipizide on the past.  He does not SMBG. Last ophthalmology exam was March 2024. Per ophthalmologist note he has active preoperative diabetic retinopathy s/p laser and intraocular injections.  Has nephropathy.  Has neuropathy.  He is on rosuvastatin 40 mg daily.  He is on lisinopril 5 mg daily.

## 2024-04-11 NOTE — ASSESSMENT
[FreeTextEntry1] : This is a 63-year-old male with type 2 diabetes mellitus with nephropathy and possible neuropathy, CAD/stent, hypertension, hyperlipidemia, CKD, anemia, osteomatosis, PVD s/p PTCA popliteal and anterior tibial artery, vitamin D insufficiency, here for follow-up.  He is on Lantus 20 units at bedtime, Januvia 50 mg daily, metformin  mg once a day. He was on Jardiance and glipizide on the past.  HBA1c from February 2024 was 8.4%. Januvia was increased from 25mg to 50mg at that time. Check hbA1c in 3 months.  Avoid GLP1 receptor agonist due to active retinopathy.  Last ophthalmology exam was March 2024. Per ophthalmologist note he has active preoperative diabetic retinopathy, s/p laser and intraocular injections.  Has nephropathy.  Has neuropathy. He is on rosuvastatin 40 mg daily. LDL is at goal (62) He is on lisinopril 5 mg daily.  Check vitamin B12 with next visit as he is on metformin.

## 2024-04-11 NOTE — ADDENDUM
[FreeTextEntry1] :  By signing my name below, I, Davey Dee, attest that this document has been prepared under the direction and in the presence of Dr. Waite.  I, Donnie Waite MD, personally performed the services described in this documentation. All medical record entries made by the scribe were at my discretion and in my presence. I have reviewed the chart and discharge instructions (if applicable) and agree that the record reflects my personal permanence and is accurate and complete.

## 2024-06-07 NOTE — PHYSICAL THERAPY INITIAL EVALUATION ADULT - SKIN INTEGRITY
Quality 431: Preventive Care And Screening: Unhealthy Alcohol Use - Screening: Patient not identified as an unhealthy alcohol user when screened for unhealthy alcohol use using a systematic screening method wound to foot not visualized at this time due to recent change by podiatry short while ago Quality 130: Documentation Of Current Medications In The Medical Record: Current Medications Documented Detail Level: Detailed Quality 226: Preventive Care And Screening: Tobacco Use: Screening And Cessation Intervention: Tobacco Screening not Performed

## 2024-06-28 ENCOUNTER — APPOINTMENT (OUTPATIENT)
Dept: NEPHROLOGY | Facility: CLINIC | Age: 64
End: 2024-06-28
Payer: MEDICAID

## 2024-06-28 VITALS
BODY MASS INDEX: 34.36 KG/M2 | SYSTOLIC BLOOD PRESSURE: 118 MMHG | WEIGHT: 240 LBS | DIASTOLIC BLOOD PRESSURE: 74 MMHG | HEART RATE: 82 BPM | RESPIRATION RATE: 16 BRPM | HEIGHT: 70 IN | OXYGEN SATURATION: 98 %

## 2024-06-28 DIAGNOSIS — E11.9 TYPE 2 DIABETES MELLITUS W/OUT COMPLICATIONS: ICD-10-CM

## 2024-06-28 DIAGNOSIS — I50.22 CHRONIC SYSTOLIC (CONGESTIVE) HEART FAILURE: ICD-10-CM

## 2024-06-28 DIAGNOSIS — N18.32 CHRONIC KIDNEY DISEASE, STAGE 3B: ICD-10-CM

## 2024-06-28 PROCEDURE — 99213 OFFICE O/P EST LOW 20 MIN: CPT

## 2024-07-08 ENCOUNTER — RX RENEWAL (OUTPATIENT)
Age: 64
End: 2024-07-08

## 2024-07-18 ENCOUNTER — APPOINTMENT (OUTPATIENT)
Dept: CARDIOLOGY | Facility: CLINIC | Age: 64
End: 2024-07-18
Payer: MEDICAID

## 2024-07-18 ENCOUNTER — NON-APPOINTMENT (OUTPATIENT)
Age: 64
End: 2024-07-18

## 2024-07-18 VITALS
HEART RATE: 93 BPM | DIASTOLIC BLOOD PRESSURE: 74 MMHG | RESPIRATION RATE: 16 BRPM | OXYGEN SATURATION: 97 % | TEMPERATURE: 97.3 F | WEIGHT: 240 LBS | BODY MASS INDEX: 34.36 KG/M2 | SYSTOLIC BLOOD PRESSURE: 121 MMHG | HEIGHT: 70 IN

## 2024-07-18 DIAGNOSIS — I25.10 ATHEROSCLEROTIC HEART DISEASE OF NATIVE CORONARY ARTERY W/OUT ANGINA PECTORIS: ICD-10-CM

## 2024-07-18 DIAGNOSIS — I50.22 CHRONIC SYSTOLIC (CONGESTIVE) HEART FAILURE: ICD-10-CM

## 2024-07-18 DIAGNOSIS — I49.1 ATRIAL PREMATURE DEPOLARIZATION: ICD-10-CM

## 2024-07-18 DIAGNOSIS — E11.9 TYPE 2 DIABETES MELLITUS W/OUT COMPLICATIONS: ICD-10-CM

## 2024-07-18 PROCEDURE — G2211 COMPLEX E/M VISIT ADD ON: CPT | Mod: NC,1L

## 2024-07-18 PROCEDURE — 99214 OFFICE O/P EST MOD 30 MIN: CPT | Mod: 25

## 2024-07-18 PROCEDURE — 93000 ELECTROCARDIOGRAM COMPLETE: CPT

## 2024-07-23 ENCOUNTER — APPOINTMENT (OUTPATIENT)
Dept: ENDOCRINOLOGY | Facility: CLINIC | Age: 64
End: 2024-07-23

## 2024-08-01 ENCOUNTER — RX RENEWAL (OUTPATIENT)
Age: 64
End: 2024-08-01

## 2024-08-20 ENCOUNTER — RX RENEWAL (OUTPATIENT)
Age: 64
End: 2024-08-20

## 2024-08-20 RX ORDER — METFORMIN ER 500 MG 500 MG/1
500 TABLET ORAL
Qty: 180 | Refills: 1 | Status: ACTIVE | COMMUNITY
Start: 2024-08-20 | End: 1900-01-01

## 2024-10-01 ENCOUNTER — APPOINTMENT (OUTPATIENT)
Dept: NEPHROLOGY | Facility: CLINIC | Age: 64
End: 2024-10-01
Payer: MEDICAID

## 2024-10-01 ENCOUNTER — LABORATORY RESULT (OUTPATIENT)
Age: 64
End: 2024-10-01

## 2024-10-01 VITALS
HEIGHT: 70 IN | WEIGHT: 137 LBS | DIASTOLIC BLOOD PRESSURE: 72 MMHG | HEART RATE: 88 BPM | BODY MASS INDEX: 19.61 KG/M2 | SYSTOLIC BLOOD PRESSURE: 111 MMHG | OXYGEN SATURATION: 98 % | RESPIRATION RATE: 16 BRPM

## 2024-10-01 DIAGNOSIS — I25.10 ATHEROSCLEROTIC HEART DISEASE OF NATIVE CORONARY ARTERY W/OUT ANGINA PECTORIS: ICD-10-CM

## 2024-10-01 DIAGNOSIS — E78.5 HYPERLIPIDEMIA, UNSPECIFIED: ICD-10-CM

## 2024-10-01 DIAGNOSIS — I10 ESSENTIAL (PRIMARY) HYPERTENSION: ICD-10-CM

## 2024-10-01 DIAGNOSIS — E11.9 TYPE 2 DIABETES MELLITUS W/OUT COMPLICATIONS: ICD-10-CM

## 2024-10-01 DIAGNOSIS — N18.32 CHRONIC KIDNEY DISEASE, STAGE 3B: ICD-10-CM

## 2024-10-01 PROCEDURE — 99213 OFFICE O/P EST LOW 20 MIN: CPT

## 2024-10-01 NOTE — HISTORY OF PRESENT ILLNESS
[FreeTextEntry1] : The patient's father  end of July.  The patient regrets that he  before knowing he had a grandson w/ his last name.  Had a period of depression, now better and went to [place] for 2 weeks.  He deeply cared about his father who  after falling at home and breaking his hip. After surgery he went to rehab. Was readmitted for an infection and  while hospitalized. He is her for follow up of type II DM with complications including proteinuric renal disease, hypertension, coronary artery disease.

## 2024-10-01 NOTE — PHYSICAL EXAM
[General Appearance - Alert] : alert [General Appearance - In No Acute Distress] : in no acute distress [Sclera] : the sclera and conjunctiva were normal [Hearing Threshold Finger Rub Not [place]] : hearing was normal [Jugular Venous Distention Increased] : there was no jugular-venous distention [Auscultation Breath Sounds / Voice Sounds] : lungs were clear to auscultation bilaterally [Heart Sounds] : normal S1 and S2 [Heart Sounds Gallop] : no gallops [Systolic grade ___/6] : A grade [unfilled]/6 systolic murmur was heard. [Pitting Edema] : pitting edema present [___ +] : bilateral [unfilled]+ pitting edema to the ankles [Abdomen Tenderness] : non-tender [No Spinal Tenderness] : no spinal tenderness [] : no rash [FreeTextEntry1] : [place] [No Focal Deficits] : no focal deficits [Oriented To Time, Place, And Person] : oriented to person, place, and time

## 2024-10-01 NOTE — ASSESSMENT
[FreeTextEntry1] : 1. CKD 3b/A3. Labs today including urinary proteins to assess control of proteinuria on ACE inhibitor and SGLT2 inhibitor.  2. Type II DM. Check A1c. 3. Hypertension: well controlled BP in target. 4. CAD asymptomatic. Return in 4 months.

## 2024-10-02 ENCOUNTER — NON-APPOINTMENT (OUTPATIENT)
Age: 64
End: 2024-10-02

## 2024-10-03 LAB
ALBUMIN SERPL ELPH-MCNC: 4.4 G/DL
ALP BLD-CCNC: 84 U/L
ALT SERPL-CCNC: 16 U/L
ANION GAP SERPL CALC-SCNC: 16 MMOL/L
APPEARANCE: ABNORMAL
AST SERPL-CCNC: 18 U/L
BILIRUB SERPL-MCNC: 0.6 MG/DL
BILIRUBIN URINE: NEGATIVE
BLOOD URINE: NEGATIVE
BUN SERPL-MCNC: 41 MG/DL
CALCIUM SERPL-MCNC: 9.8 MG/DL
CHLORIDE SERPL-SCNC: 96 MMOL/L
CO2 SERPL-SCNC: 26 MMOL/L
COLOR: YELLOW
CREAT SERPL-MCNC: 2.13 MG/DL
CREAT SPEC-SCNC: 217 MG/DL
CREAT/PROT UR: 0.7 RATIO
EGFR: 34 ML/MIN/1.73M2
ESTIMATED AVERAGE GLUCOSE: 220 MG/DL
GLUCOSE QUALITATIVE U: 100 MG/DL
GLUCOSE SERPL-MCNC: 267 MG/DL
HBA1C MFR BLD HPLC: 9.3 %
HCT VFR BLD CALC: 34.6 %
HGB BLD-MCNC: 11.3 G/DL
KETONES URINE: ABNORMAL MG/DL
LEUKOCYTE ESTERASE URINE: NEGATIVE
MCHC RBC-ENTMCNC: 26.4 PG
MCHC RBC-ENTMCNC: 32.7 GM/DL
MCV RBC AUTO: 80.8 FL
NITRITE URINE: NEGATIVE
PH URINE: 5.5
PLATELET # BLD AUTO: 246 K/UL
POTASSIUM SERPL-SCNC: 4.7 MMOL/L
PROT SERPL-MCNC: 7.6 G/DL
PROT UR-MCNC: 160 MG/DL
PROTEIN URINE: 100 MG/DL
RBC # BLD: 4.28 M/UL
RBC # FLD: 15 %
SODIUM SERPL-SCNC: 137 MMOL/L
SPECIFIC GRAVITY URINE: 1.02
UROBILINOGEN URINE: 1 MG/DL
WBC # FLD AUTO: 9.52 K/UL

## 2024-10-21 ENCOUNTER — RX RENEWAL (OUTPATIENT)
Age: 64
End: 2024-10-21

## 2024-10-23 ENCOUNTER — APPOINTMENT (OUTPATIENT)
Dept: INTERNAL MEDICINE | Facility: CLINIC | Age: 64
End: 2024-10-23

## 2024-10-24 ENCOUNTER — APPOINTMENT (OUTPATIENT)
Dept: INTERNAL MEDICINE | Facility: CLINIC | Age: 64
End: 2024-10-24
Payer: MEDICAID

## 2024-10-24 VITALS
HEIGHT: 70 IN | HEART RATE: 82 BPM | RESPIRATION RATE: 12 BRPM | SYSTOLIC BLOOD PRESSURE: 106 MMHG | TEMPERATURE: 97 F | DIASTOLIC BLOOD PRESSURE: 69 MMHG | WEIGHT: 238 LBS | BODY MASS INDEX: 34.07 KG/M2 | OXYGEN SATURATION: 98 %

## 2024-10-24 DIAGNOSIS — I50.22 CHRONIC SYSTOLIC (CONGESTIVE) HEART FAILURE: ICD-10-CM

## 2024-10-24 DIAGNOSIS — Z00.00 ENCOUNTER FOR GENERAL ADULT MEDICAL EXAMINATION W/OUT ABNORMAL FINDINGS: ICD-10-CM

## 2024-10-24 DIAGNOSIS — E78.5 HYPERLIPIDEMIA, UNSPECIFIED: ICD-10-CM

## 2024-10-24 DIAGNOSIS — I10 ESSENTIAL (PRIMARY) HYPERTENSION: ICD-10-CM

## 2024-10-24 DIAGNOSIS — I25.10 ATHEROSCLEROTIC HEART DISEASE OF NATIVE CORONARY ARTERY W/OUT ANGINA PECTORIS: ICD-10-CM

## 2024-10-24 DIAGNOSIS — N18.9 CHRONIC KIDNEY DISEASE, UNSPECIFIED: ICD-10-CM

## 2024-10-24 DIAGNOSIS — E11.9 TYPE 2 DIABETES MELLITUS W/OUT COMPLICATIONS: ICD-10-CM

## 2024-10-24 PROCEDURE — G0444 DEPRESSION SCREEN ANNUAL: CPT | Mod: 59

## 2024-10-24 PROCEDURE — 99396 PREV VISIT EST AGE 40-64: CPT

## 2024-10-25 LAB
25(OH)D3 SERPL-MCNC: 32.9 NG/ML
ALBUMIN SERPL ELPH-MCNC: 4.3 G/DL
ALP BLD-CCNC: 78 U/L
ALT SERPL-CCNC: 16 U/L
ANION GAP SERPL CALC-SCNC: 15 MMOL/L
AST SERPL-CCNC: 19 U/L
BASOPHILS # BLD AUTO: 0.07 K/UL
BASOPHILS NFR BLD AUTO: 0.8 %
BILIRUB SERPL-MCNC: 0.4 MG/DL
BUN SERPL-MCNC: 34 MG/DL
CALCIUM SERPL-MCNC: 9.6 MG/DL
CHLORIDE SERPL-SCNC: 101 MMOL/L
CHOLEST SERPL-MCNC: 104 MG/DL
CO2 SERPL-SCNC: 24 MMOL/L
CREAT SERPL-MCNC: 2.39 MG/DL
EGFR: 30 ML/MIN/1.73M2
EOSINOPHIL # BLD AUTO: 0.14 K/UL
EOSINOPHIL NFR BLD AUTO: 1.6 %
ESTIMATED AVERAGE GLUCOSE: 214 MG/DL
GLUCOSE SERPL-MCNC: 216 MG/DL
HBA1C MFR BLD HPLC: 9.1 %
HCT VFR BLD CALC: 38.1 %
HDLC SERPL-MCNC: 27 MG/DL
HGB BLD-MCNC: 12 G/DL
IMM GRANULOCYTES NFR BLD AUTO: 0.2 %
LDLC SERPL CALC-MCNC: 51 MG/DL
LYMPHOCYTES # BLD AUTO: 2.28 K/UL
LYMPHOCYTES NFR BLD AUTO: 26 %
MAN DIFF?: NORMAL
MCHC RBC-ENTMCNC: 27.6 PG
MCHC RBC-ENTMCNC: 31.5 GM/DL
MCV RBC AUTO: 87.6 FL
MONOCYTES # BLD AUTO: 0.61 K/UL
MONOCYTES NFR BLD AUTO: 6.9 %
NEUTROPHILS # BLD AUTO: 5.66 K/UL
NEUTROPHILS NFR BLD AUTO: 64.5 %
NONHDLC SERPL-MCNC: 77 MG/DL
PLATELET # BLD AUTO: 241 K/UL
POTASSIUM SERPL-SCNC: 3.9 MMOL/L
PROT SERPL-MCNC: 7.7 G/DL
PSA SERPL-MCNC: 0.51 NG/ML
RBC # BLD: 4.35 M/UL
RBC # FLD: 15.9 %
SODIUM SERPL-SCNC: 139 MMOL/L
TRIGL SERPL-MCNC: 149 MG/DL
TSH SERPL-ACNC: 1.07 UIU/ML
VIT B12 SERPL-MCNC: 539 PG/ML
WBC # FLD AUTO: 8.78 K/UL

## 2024-10-29 RX ORDER — EMPAGLIFLOZIN 10 MG/1
10 TABLET, FILM COATED ORAL
Qty: 30 | Refills: 2 | Status: DISCONTINUED | COMMUNITY
Start: 2024-10-24 | End: 2024-10-29

## 2024-11-22 ENCOUNTER — APPOINTMENT (OUTPATIENT)
Dept: INTERNAL MEDICINE | Facility: CLINIC | Age: 64
End: 2024-11-22
Payer: MEDICARE

## 2024-11-22 VITALS
HEIGHT: 70 IN | BODY MASS INDEX: 33.36 KG/M2 | RESPIRATION RATE: 12 BRPM | SYSTOLIC BLOOD PRESSURE: 113 MMHG | HEART RATE: 89 BPM | DIASTOLIC BLOOD PRESSURE: 76 MMHG | WEIGHT: 233 LBS | OXYGEN SATURATION: 97 %

## 2024-11-22 DIAGNOSIS — E78.5 HYPERLIPIDEMIA, UNSPECIFIED: ICD-10-CM

## 2024-11-22 DIAGNOSIS — E11.9 TYPE 2 DIABETES MELLITUS W/OUT COMPLICATIONS: ICD-10-CM

## 2024-11-22 DIAGNOSIS — I10 ESSENTIAL (PRIMARY) HYPERTENSION: ICD-10-CM

## 2024-11-22 DIAGNOSIS — I25.10 ATHEROSCLEROTIC HEART DISEASE OF NATIVE CORONARY ARTERY W/OUT ANGINA PECTORIS: ICD-10-CM

## 2024-11-22 DIAGNOSIS — N18.9 CHRONIC KIDNEY DISEASE, UNSPECIFIED: ICD-10-CM

## 2024-11-22 DIAGNOSIS — D64.9 ANEMIA, UNSPECIFIED: ICD-10-CM

## 2024-11-22 PROCEDURE — 99204 OFFICE O/P NEW MOD 45 MIN: CPT

## 2024-11-26 ENCOUNTER — APPOINTMENT (OUTPATIENT)
Dept: ENDOCRINOLOGY | Facility: CLINIC | Age: 64
End: 2024-11-26

## 2024-11-26 VITALS
HEART RATE: 81 BPM | HEIGHT: 70 IN | OXYGEN SATURATION: 99 % | RESPIRATION RATE: 12 BRPM | BODY MASS INDEX: 33.64 KG/M2 | SYSTOLIC BLOOD PRESSURE: 106 MMHG | DIASTOLIC BLOOD PRESSURE: 70 MMHG | WEIGHT: 235 LBS

## 2024-11-26 DIAGNOSIS — E11.9 TYPE 2 DIABETES MELLITUS W/OUT COMPLICATIONS: ICD-10-CM

## 2024-11-26 PROCEDURE — 99203 OFFICE O/P NEW LOW 30 MIN: CPT

## 2024-11-26 RX ORDER — REPAGLINIDE 0.5 MG/1
0.5 TABLET ORAL 3 TIMES DAILY
Qty: 270 | Refills: 2 | Status: ACTIVE | COMMUNITY
Start: 2024-11-26 | End: 2025-08-23

## 2024-11-27 ENCOUNTER — APPOINTMENT (OUTPATIENT)
Dept: CARDIOLOGY | Facility: CLINIC | Age: 64
End: 2024-11-27

## 2025-01-10 ENCOUNTER — APPOINTMENT (OUTPATIENT)
Dept: INTERNAL MEDICINE | Facility: CLINIC | Age: 65
End: 2025-01-10

## 2025-01-21 ENCOUNTER — NON-APPOINTMENT (OUTPATIENT)
Age: 65
End: 2025-01-21

## 2025-02-13 ENCOUNTER — APPOINTMENT (OUTPATIENT)
Dept: NEPHROLOGY | Facility: CLINIC | Age: 65
End: 2025-02-13
Payer: MEDICARE

## 2025-02-13 VITALS
OXYGEN SATURATION: 98 % | SYSTOLIC BLOOD PRESSURE: 104 MMHG | RESPIRATION RATE: 16 BRPM | HEART RATE: 80 BPM | DIASTOLIC BLOOD PRESSURE: 64 MMHG | BODY MASS INDEX: 32.35 KG/M2 | WEIGHT: 226 LBS | HEIGHT: 70 IN

## 2025-02-13 DIAGNOSIS — N18.32 CHRONIC KIDNEY DISEASE, STAGE 3B: ICD-10-CM

## 2025-02-13 DIAGNOSIS — I25.10 ATHEROSCLEROTIC HEART DISEASE OF NATIVE CORONARY ARTERY W/OUT ANGINA PECTORIS: ICD-10-CM

## 2025-02-13 DIAGNOSIS — E11.9 TYPE 2 DIABETES MELLITUS W/OUT COMPLICATIONS: ICD-10-CM

## 2025-02-13 PROCEDURE — 99203 OFFICE O/P NEW LOW 30 MIN: CPT

## 2025-02-13 RX ORDER — SEMAGLUTIDE 0.68 MG/ML
2 INJECTION, SOLUTION SUBCUTANEOUS DAILY
Qty: 1 | Refills: 3 | Status: ACTIVE | COMMUNITY
Start: 2025-02-13

## 2025-02-13 RX ORDER — AMIODARONE HYDROCHLORIDE 200 MG/1
200 TABLET ORAL
Qty: 90 | Refills: 3 | Status: ACTIVE | COMMUNITY
Start: 2025-02-13

## 2025-03-18 ENCOUNTER — APPOINTMENT (OUTPATIENT)
Dept: ENDOCRINOLOGY | Facility: CLINIC | Age: 65
End: 2025-03-18
Payer: MEDICARE

## 2025-03-18 VITALS
WEIGHT: 222 LBS | OXYGEN SATURATION: 97 % | HEIGHT: 70 IN | BODY MASS INDEX: 31.78 KG/M2 | DIASTOLIC BLOOD PRESSURE: 60 MMHG | HEART RATE: 82 BPM | RESPIRATION RATE: 12 BRPM | SYSTOLIC BLOOD PRESSURE: 88 MMHG

## 2025-03-18 VITALS — SYSTOLIC BLOOD PRESSURE: 96 MMHG | DIASTOLIC BLOOD PRESSURE: 66 MMHG

## 2025-03-18 DIAGNOSIS — E11.9 TYPE 2 DIABETES MELLITUS W/OUT COMPLICATIONS: ICD-10-CM

## 2025-03-18 DIAGNOSIS — E78.5 HYPERLIPIDEMIA, UNSPECIFIED: ICD-10-CM

## 2025-03-18 PROCEDURE — 36415 COLL VENOUS BLD VENIPUNCTURE: CPT

## 2025-03-18 PROCEDURE — 95251 CONT GLUC MNTR ANALYSIS I&R: CPT

## 2025-03-18 PROCEDURE — 99214 OFFICE O/P EST MOD 30 MIN: CPT

## 2025-03-20 ENCOUNTER — APPOINTMENT (OUTPATIENT)
Dept: CARDIOLOGY | Facility: CLINIC | Age: 65
End: 2025-03-20
Payer: MEDICARE

## 2025-03-20 ENCOUNTER — NON-APPOINTMENT (OUTPATIENT)
Age: 65
End: 2025-03-20

## 2025-03-20 ENCOUNTER — APPOINTMENT (OUTPATIENT)
Dept: INTERNAL MEDICINE | Facility: CLINIC | Age: 65
End: 2025-03-20
Payer: MEDICARE

## 2025-03-20 ENCOUNTER — APPOINTMENT (OUTPATIENT)
Dept: ELECTROPHYSIOLOGY | Facility: CLINIC | Age: 65
End: 2025-03-20
Payer: MEDICARE

## 2025-03-20 VITALS
WEIGHT: 222 LBS | BODY MASS INDEX: 31.78 KG/M2 | OXYGEN SATURATION: 97 % | HEIGHT: 70 IN | SYSTOLIC BLOOD PRESSURE: 92 MMHG | DIASTOLIC BLOOD PRESSURE: 50 MMHG | HEART RATE: 60 BPM | RESPIRATION RATE: 12 BRPM

## 2025-03-20 DIAGNOSIS — I50.22 CHRONIC SYSTOLIC (CONGESTIVE) HEART FAILURE: ICD-10-CM

## 2025-03-20 DIAGNOSIS — I10 ESSENTIAL (PRIMARY) HYPERTENSION: ICD-10-CM

## 2025-03-20 DIAGNOSIS — I47.20 VENTRICULAR TACHYCARDIA, UNSPECIFIED: ICD-10-CM

## 2025-03-20 DIAGNOSIS — E11.40 TYPE 2 DIABETES MELLITUS WITH DIABETIC NEUROPATHY, UNSPECIFIED: ICD-10-CM

## 2025-03-20 DIAGNOSIS — I49.1 ATRIAL PREMATURE DEPOLARIZATION: ICD-10-CM

## 2025-03-20 LAB
25(OH)D3 SERPL-MCNC: 35.1 NG/ML
ALBUMIN SERPL ELPH-MCNC: 4.6 G/DL
ALP BLD-CCNC: 88 U/L
ALT SERPL-CCNC: 25 U/L
ANION GAP SERPL CALC-SCNC: 17 MMOL/L
AST SERPL-CCNC: 25 U/L
BASOPHILS # BLD AUTO: 0.09 K/UL
BASOPHILS NFR BLD AUTO: 1 %
BILIRUB SERPL-MCNC: 0.7 MG/DL
BUN SERPL-MCNC: 44 MG/DL
CALCIUM SERPL-MCNC: 9.7 MG/DL
CHLORIDE SERPL-SCNC: 101 MMOL/L
CHOLEST SERPL-MCNC: 108 MG/DL
CO2 SERPL-SCNC: 24 MMOL/L
CREAT SERPL-MCNC: 3.65 MG/DL
EGFRCR SERPLBLD CKD-EPI 2021: 18 ML/MIN/1.73M2
EOSINOPHIL # BLD AUTO: 0.22 K/UL
EOSINOPHIL NFR BLD AUTO: 2.4 %
ESTIMATED AVERAGE GLUCOSE: 148 MG/DL
GLUCOSE SERPL-MCNC: 113 MG/DL
HBA1C MFR BLD HPLC: 6.8 %
HCT VFR BLD CALC: 35.9 %
HDLC SERPL-MCNC: 25 MG/DL
HGB BLD-MCNC: 11.3 G/DL
IMM GRANULOCYTES NFR BLD AUTO: 0.2 %
LDLC SERPL-MCNC: 57 MG/DL
LYMPHOCYTES # BLD AUTO: 2.24 K/UL
LYMPHOCYTES NFR BLD AUTO: 24 %
MAN DIFF?: NORMAL
MCHC RBC-ENTMCNC: 28.2 PG
MCHC RBC-ENTMCNC: 31.5 G/DL
MCV RBC AUTO: 89.5 FL
MONOCYTES # BLD AUTO: 0.64 K/UL
MONOCYTES NFR BLD AUTO: 6.9 %
NEUTROPHILS # BLD AUTO: 6.12 K/UL
NEUTROPHILS NFR BLD AUTO: 65.5 %
NONHDLC SERPL-MCNC: 84 MG/DL
PLATELET # BLD AUTO: 244 K/UL
POTASSIUM SERPL-SCNC: 4.4 MMOL/L
PROT SERPL-MCNC: 7.9 G/DL
RBC # BLD: 4.01 M/UL
RBC # FLD: 14.7 %
SODIUM SERPL-SCNC: 142 MMOL/L
T4 FREE SERPL-MCNC: 1.6 NG/DL
TRIGL SERPL-MCNC: 154 MG/DL
TSH SERPL-ACNC: 1.28 UIU/ML
VIT B12 SERPL-MCNC: 594 PG/ML
WBC # FLD AUTO: 9.33 K/UL

## 2025-03-20 PROCEDURE — 93282 PRGRMG EVAL IMPLANTABLE DFB: CPT

## 2025-03-20 PROCEDURE — 99205 OFFICE O/P NEW HI 60 MIN: CPT

## 2025-03-20 PROCEDURE — 99204 OFFICE O/P NEW MOD 45 MIN: CPT

## 2025-03-20 PROCEDURE — 93000 ELECTROCARDIOGRAM COMPLETE: CPT

## 2025-03-20 PROCEDURE — 99214 OFFICE O/P EST MOD 30 MIN: CPT

## 2025-03-20 PROCEDURE — G2211 COMPLEX E/M VISIT ADD ON: CPT

## 2025-03-23 LAB
CREAT SPEC-SCNC: 210 MG/DL
MICROALBUMIN 24H UR DL<=1MG/L-MCNC: 34.9 MG/DL
MICROALBUMIN/CREAT 24H UR-RTO: 166 MG/G

## 2025-03-26 ENCOUNTER — NON-APPOINTMENT (OUTPATIENT)
Age: 65
End: 2025-03-26

## 2025-03-26 RX ORDER — DAPAGLIFLOZIN 5 MG/1
5 TABLET, FILM COATED ORAL
Qty: 3 | Refills: 2 | Status: ACTIVE | COMMUNITY
Start: 2025-03-26 | End: 2025-12-21

## 2025-03-27 ENCOUNTER — APPOINTMENT (OUTPATIENT)
Dept: INTERNAL MEDICINE | Facility: CLINIC | Age: 65
End: 2025-03-27
Payer: MEDICARE

## 2025-03-27 VITALS
DIASTOLIC BLOOD PRESSURE: 59 MMHG | OXYGEN SATURATION: 100 % | WEIGHT: 220 LBS | BODY MASS INDEX: 31.5 KG/M2 | HEIGHT: 70 IN | HEART RATE: 71 BPM | SYSTOLIC BLOOD PRESSURE: 93 MMHG | RESPIRATION RATE: 16 BRPM

## 2025-03-27 DIAGNOSIS — E11.9 TYPE 2 DIABETES MELLITUS W/OUT COMPLICATIONS: ICD-10-CM

## 2025-03-27 DIAGNOSIS — N18.9 CHRONIC KIDNEY DISEASE, UNSPECIFIED: ICD-10-CM

## 2025-03-27 DIAGNOSIS — I10 ESSENTIAL (PRIMARY) HYPERTENSION: ICD-10-CM

## 2025-03-27 DIAGNOSIS — E78.5 HYPERLIPIDEMIA, UNSPECIFIED: ICD-10-CM

## 2025-03-27 DIAGNOSIS — I25.10 ATHEROSCLEROTIC HEART DISEASE OF NATIVE CORONARY ARTERY W/OUT ANGINA PECTORIS: ICD-10-CM

## 2025-03-27 PROCEDURE — 99214 OFFICE O/P EST MOD 30 MIN: CPT

## 2025-03-28 LAB
ANION GAP SERPL CALC-SCNC: 13 MMOL/L
BUN SERPL-MCNC: 50 MG/DL
CALCIUM SERPL-MCNC: 9.6 MG/DL
CHLORIDE SERPL-SCNC: 102 MMOL/L
CO2 SERPL-SCNC: 26 MMOL/L
CREAT SERPL-MCNC: 3.67 MG/DL
EGFRCR SERPLBLD CKD-EPI 2021: 18 ML/MIN/1.73M2
GLUCOSE SERPL-MCNC: 195 MG/DL
POTASSIUM SERPL-SCNC: 4.4 MMOL/L
SODIUM SERPL-SCNC: 141 MMOL/L

## 2025-04-03 ENCOUNTER — TRANSCRIPTION ENCOUNTER (OUTPATIENT)
Age: 65
End: 2025-04-03

## 2025-04-03 ENCOUNTER — INPATIENT (INPATIENT)
Facility: HOSPITAL | Age: 65
LOS: 0 days | Discharge: HOME | DRG: 310 | End: 2025-04-04
Attending: INTERNAL MEDICINE | Admitting: INTERNAL MEDICINE
Payer: MEDICARE

## 2025-04-03 VITALS
RESPIRATION RATE: 18 BRPM | SYSTOLIC BLOOD PRESSURE: 154 MMHG | TEMPERATURE: 99 F | WEIGHT: 220.02 LBS | HEART RATE: 78 BPM | OXYGEN SATURATION: 98 % | HEIGHT: 70 IN | DIASTOLIC BLOOD PRESSURE: 73 MMHG

## 2025-04-03 DIAGNOSIS — Z98.890 OTHER SPECIFIED POSTPROCEDURAL STATES: Chronic | ICD-10-CM

## 2025-04-03 DIAGNOSIS — S62.101A FRACTURE OF UNSPECIFIED CARPAL BONE, RIGHT WRIST, INITIAL ENCOUNTER FOR CLOSED FRACTURE: Chronic | ICD-10-CM

## 2025-04-03 DIAGNOSIS — I47.20 VENTRICULAR TACHYCARDIA, UNSPECIFIED: ICD-10-CM

## 2025-04-03 DIAGNOSIS — M43.22 FUSION OF SPINE, CERVICAL REGION: Chronic | ICD-10-CM

## 2025-04-03 PROBLEM — I10 ESSENTIAL (PRIMARY) HYPERTENSION: Chronic | Status: INACTIVE | Noted: 2023-08-04 | Resolved: 2025-04-03

## 2025-04-03 PROBLEM — E11.9 TYPE 2 DIABETES MELLITUS WITHOUT COMPLICATIONS: Chronic | Status: INACTIVE | Noted: 2023-08-04 | Resolved: 2025-04-03

## 2025-04-03 PROBLEM — I25.10 ATHEROSCLEROTIC HEART DISEASE OF NATIVE CORONARY ARTERY WITHOUT ANGINA PECTORIS: Chronic | Status: INACTIVE | Noted: 2023-08-04 | Resolved: 2025-04-03

## 2025-04-03 LAB
GLUCOSE BLDC GLUCOMTR-MCNC: 147 MG/DL — HIGH (ref 70–99)
GLUCOSE BLDC GLUCOMTR-MCNC: 148 MG/DL — HIGH (ref 70–99)
GLUCOSE BLDC GLUCOMTR-MCNC: 244 MG/DL — HIGH (ref 70–99)

## 2025-04-03 PROCEDURE — 93654 COMPRE EP EVAL TX VT: CPT

## 2025-04-03 PROCEDURE — 93010 ELECTROCARDIOGRAM REPORT: CPT | Mod: 77

## 2025-04-03 PROCEDURE — 93462 L HRT CATH TRNSPTL PUNCTURE: CPT

## 2025-04-03 RX ORDER — SODIUM BICARBONATE 1 MEQ/ML
650 SYRINGE (ML) INTRAVENOUS
Refills: 0 | Status: DISCONTINUED | OUTPATIENT
Start: 2025-04-03 | End: 2025-04-04

## 2025-04-03 RX ORDER — SEMAGLUTIDE 1 MG/.5ML
0.5 INJECTION, SOLUTION SUBCUTANEOUS
Refills: 0 | DISCHARGE

## 2025-04-03 RX ORDER — ROSUVASTATIN CALCIUM 5 MG/1
40 TABLET, FILM COATED ORAL AT BEDTIME
Refills: 0 | Status: DISCONTINUED | OUTPATIENT
Start: 2025-04-03 | End: 2025-04-04

## 2025-04-03 RX ORDER — INSULIN GLARGINE-YFGN 100 [IU]/ML
5 INJECTION, SOLUTION SUBCUTANEOUS
Refills: 0 | DISCHARGE

## 2025-04-03 RX ORDER — INSULIN LISPRO 100 U/ML
INJECTION, SOLUTION INTRAVENOUS; SUBCUTANEOUS AT BEDTIME
Refills: 0 | Status: DISCONTINUED | OUTPATIENT
Start: 2025-04-03 | End: 2025-04-04

## 2025-04-03 RX ORDER — DAPAGLIFLOZIN 5 MG/1
1 TABLET, FILM COATED ORAL
Refills: 0 | DISCHARGE

## 2025-04-03 RX ORDER — TICAGRELOR 90 MG/1
90 TABLET ORAL EVERY 12 HOURS
Refills: 0 | Status: DISCONTINUED | OUTPATIENT
Start: 2025-04-03 | End: 2025-04-04

## 2025-04-03 RX ORDER — AMIODARONE HYDROCHLORIDE 50 MG/ML
200 INJECTION, SOLUTION INTRAVENOUS DAILY
Refills: 0 | Status: DISCONTINUED | OUTPATIENT
Start: 2025-04-03 | End: 2025-04-04

## 2025-04-03 RX ORDER — LISINOPRIL 5 MG/1
1 TABLET ORAL
Refills: 0 | DISCHARGE

## 2025-04-03 RX ORDER — LISINOPRIL 5 MG/1
5 TABLET ORAL DAILY
Refills: 0 | Status: DISCONTINUED | OUTPATIENT
Start: 2025-04-03 | End: 2025-04-04

## 2025-04-03 RX ORDER — AMIODARONE HYDROCHLORIDE 50 MG/ML
400 INJECTION, SOLUTION INTRAVENOUS
Refills: 0 | Status: DISCONTINUED | OUTPATIENT
Start: 2025-04-03 | End: 2025-04-03

## 2025-04-03 RX ORDER — INSULIN LISPRO 100 U/ML
INJECTION, SOLUTION INTRAVENOUS; SUBCUTANEOUS
Refills: 0 | Status: DISCONTINUED | OUTPATIENT
Start: 2025-04-03 | End: 2025-04-04

## 2025-04-03 RX ORDER — APIXABAN 2.5 MG/1
1 TABLET, FILM COATED ORAL
Qty: 60 | Refills: 0
Start: 2025-04-03 | End: 2025-05-02

## 2025-04-03 RX ORDER — SODIUM BICARBONATE 1 MEQ/ML
650 SYRINGE (ML) INTRAVENOUS
Refills: 0 | DISCHARGE

## 2025-04-03 RX ORDER — ROSUVASTATIN CALCIUM 5 MG/1
1 TABLET, FILM COATED ORAL
Refills: 0 | DISCHARGE

## 2025-04-03 RX ORDER — APIXABAN 2.5 MG/1
5 TABLET, FILM COATED ORAL EVERY 12 HOURS
Refills: 0 | Status: DISCONTINUED | OUTPATIENT
Start: 2025-04-03 | End: 2025-04-04

## 2025-04-03 RX ADMIN — AMIODARONE HYDROCHLORIDE 200 MILLIGRAM(S): 50 INJECTION, SOLUTION INTRAVENOUS at 17:51

## 2025-04-03 RX ADMIN — TICAGRELOR 90 MILLIGRAM(S): 90 TABLET ORAL at 17:51

## 2025-04-03 RX ADMIN — Medication 650 MILLIGRAM(S): at 17:51

## 2025-04-03 RX ADMIN — APIXABAN 5 MILLIGRAM(S): 2.5 TABLET, FILM COATED ORAL at 17:51

## 2025-04-03 RX ADMIN — ROSUVASTATIN CALCIUM 40 MILLIGRAM(S): 5 TABLET, FILM COATED ORAL at 21:42

## 2025-04-03 NOTE — PATIENT PROFILE ADULT - NSPROMEDSADMININFO_GEN_A_NUR
[facility]ANDA Post Partum apt request (delivered 02/26)    Dr Srinivasa Singleton  [address] [place] [address] [phone]  Follow up 6 weeks  Apt made:  Mon 04/15 @11:20am  Confirmed w/pt  Closing encounter  
no concerns

## 2025-04-03 NOTE — H&P CARDIOLOGY - HISTORY OF PRESENT ILLNESS
EP: Dr. Kenney Chaves  Cardiologist: Dr. 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 Pt with 2 episodes of VT that were symptomatic. Has been maintained on Amiodarone. Pt presents today for VT ablation. Denies headaches, changes in vision, dizziness, chest pain, palpitations, SOB/PITTS, abdominal pain, N/V/D, leg pain/edema, hematuria, BRBPR, melena or any other complaints.  EP: Dr. Kenney Chaves  Cardiologist: Dr. Bharti Quispe  Nephrologist: Dr. 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 Pt with 2 episodes of VT that were symptomatic. Has been maintained on Amiodarone. Pt presents today for VT ablation. Denies headaches, changes in vision, dizziness, chest pain, palpitations, SOB/PITTS, abdominal pain, N/V/D, leg pain/edema, hematuria, BRBPR, melena or any other complaints.     Labs:  CBC 3/18/25: WBC 9.33, H/H 11.3/35.9, plt 244  BMP 3/27/25: Na 141, K 4.4, Cl 102, HCO3 26, BUN/Cr 50/3.67, glucose 195, eGFR 18 EP: Dr. Kenney Chaves  Cardiologist: Dr. Bharti Quispe  Nephrologist: Dr. 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 Pt with 2 episodes of VT that were symptomatic. Has been maintained on Amiodarone. Pt presents today for VT ablation. Denies headaches, changes in vision, dizziness, chest pain, palpitations, SOB/PITTS, abdominal pain, N/V/D, leg pain/edema, hematuria, BRBPR, melena or any other complaints.     Labs:  CBC 3/18/25: WBC 9.33, H/H 11.3/35.9, plt 244  BMP 3/27/25: Na 141, K 4.4, Cl 102, HCO3 26, BUN/Cr 50/3.67, glucose 195, eGFR 18

## 2025-04-03 NOTE — DISCHARGE NOTE PROVIDER - CARE PROVIDERS DIRECT ADDRESSES
,[email or web address]

## 2025-04-03 NOTE — H&P CARDIOLOGY - NSICDXPASTMEDICALHX_GEN_ALL_CORE_FT
PAST MEDICAL HISTORY:  CAD (coronary artery disease)     DM (diabetes mellitus)     Essential hypertension     Heart failure 4/2016 , request last echo to be faxed from Dr Quispe with cardiac clearance    History of ventricular tachycardia     Obesity (BMI 30-39.9) BMI 39.9    Other hyperlipidemia     PAD (peripheral artery disease)     Stage 3 chronic kidney disease     Stented coronary artery times 6 ; last 4/2016 total of 6 stents , on brilinta and aspirin which has not been discontinued , cardiac evaluation scheduled today

## 2025-04-03 NOTE — PRE-ANESTHESIA EVALUATION ADULT - NSANTHADDINFOFT_GEN_ALL_CORE
Discussed loose tooth and acute on chronic renal dysfunction with the patient and Dr. Chaves. Pt informed of risks of losing the loose tooth and aspiration. Per Dr. Chaves, the procedure is urgent due to symptomatic VT episodes x2. Will proceed, benefits outweigh risks, pt agrees.

## 2025-04-03 NOTE — DISCHARGE NOTE PROVIDER - NSDCCPCAREPLAN_GEN_ALL_CORE_FT
PRINCIPAL DISCHARGE DIAGNOSIS  Diagnosis: History of ventricular tachycardia  Assessment and Plan of Treatment: It is important to continue your normal daily activities and to continue to walk as much as possible; and resting when necessary. Do not perform any strenuous activity or heavy lifting until cleared. Your heart muscle is currently recovering and you should not be exerting it. No bathing, swimming, or submerging yourself in water until cleared. You have incisions that need to heal and bathing/swimming can expose it to bacteria.  No creams to your groin access site. You may remove your dressing when you arrive home. You may shower. Allow soap and water to run over your incision and pat area dry. Ensure that your groin access site remain dry at all times to prevent risk of infection. Follow up with your electrophysiologist as scheduled. . If you experience any signs of atrial fibrillation such as dizziness, fatigue, palpitations, or fainting please inform your doctor as soon as possible or go to your nearest emergency room. If you experience any signs of bleeding such as bleeding gums, bloody sputum, bleeding in your stool or black stool inform your primary care doctor; your blood thinner may need to be adjusted.       PRINCIPAL DISCHARGE DIAGNOSIS  Diagnosis: History of ventricular tachycardia  Assessment and Plan of Treatment: You had VT ablation or your rhythm.  You continue on amiodarone 200mg daily to also help to suppress your VT.  You will continue with Eliquis 5mg twice daily for one month and hold your Aspirin during this time.  You will also continue your Brilinta.  Eliquis has been sent to your [facility] pharmacy and cost is nominal under $5.00.  After you finish the 30 days you will resume you Aspirin with your Brilinta.         SECONDARY DISCHARGE DIAGNOSES  Diagnosis: HTN (hypertension)  Assessment and Plan of Treatment: Continue with your blood pressure medications; eat a heart healthy diet with low salt diet; exercise regularly (consult with your physician or cardiologist first); maintain a heart healthy weight; if you smoke - quit (A resource to help you stop smoking is the [facility] Tobacco Control – phone number [phone].); include healthy ways to manage stress. Continue to follow with your primary care physician or cardiologist.    Diagnosis: HLD (hyperlipidemia)  Assessment and Plan of Treatment: Continue with your cholesterol medications. Eat a heart healthy diet that is low in saturated fats and salt, and includes whole grains, fruits, vegetables and lean protein; exercise regularly (consult with your physician or cardiologist first); maintain a heart healthy weight; if you smoke - quit (A resource to help you stop smoking is the [facility] [employer] Control – phone number [phone].). Continue to follow with your primary physician or cardiologist.    Diagnosis: Diabetes  Assessment and Plan of Treatment: Continue to follow with your primary care MD or your endocrinologist.  Follow a heart healthy diabetic diet. If you check your fingerstick glucose at home, call your MD if it is greater than 250mg/dL on 2 occasions or less than 100mg/dL on 2 occasions. Know signs of low blood sugar, such as: dizziness, shakiness, sweating, confusion, hunger, nervousness-drink 4 ounces apple juice if occurs and call your doctor. Know early signs of high blood sugar, such as: frequent urination, increased thirst, blurry vision, fatigue, headache - call your doctor if this occurs. Follow with other practitioners to care for your diabetes, such as ophthamologist and podiatrist.    Diagnosis: CAD (coronary artery disease)  Assessment and Plan of Treatment: Low salt, low fat diet.   Weight management.   Take medications as prescribed.    No smoking.  Follow up appointments with your doctor(s)  as instructed.    Diagnosis: Systolic heart failure, chronic  Assessment and Plan of Treatment: Take your medications as prescribed. Follow a low-salt, low salt, low cholesterol heart healthy diet. Weigh yourself every day and keep a record; call your doctor if you gain 2 pounds over one to two days or 5 pounds over three days. Get to or maintain a healthy weight; ask your Cardiologist or heart failure team for referrals to a registered dietitian if needed. Avoid alcohol. Be active (check with your physician or cardiologist first). Find healthy ways to deal with stress, such as deep breathing, meditation, exercise, and doing hobbies that you enjoy. If you smoke, quit. (A resource to help you stop smoking is the [facility] – phone number [phone].).

## 2025-04-03 NOTE — DISCHARGE NOTE PROVIDER - NSDCACTIVITY_GEN_ALL_CORE
Walking - Indoors allowed/Walking - Outdoors allowed Showering allowed/Stairs allowed/Walking - Indoors allowed/No heavy lifting/straining/Walking - Outdoors allowed

## 2025-04-03 NOTE — PATIENT PROFILE ADULT - NSPROIMPLANTSMEDDEV_GEN_A_NUR
metal in both wrists and neck/Automatic Implantable Cardioverter Defibrillator/Vascular stents/Clips

## 2025-04-03 NOTE — DISCHARGE NOTE PROVIDER - NSDCCPTREATMENT_GEN_ALL_CORE_FT
PRINCIPAL PROCEDURE  Procedure: Ablation, arrhythmogenic focus, for ventricular tachycardia  Findings and Treatment:      PRINCIPAL PROCEDURE  Procedure: Ablation, arrhythmogenic focus, for ventricular tachycardia  Findings and Treatment: You will take Eliquis 5mg twice daily for 30 days and drop Aspirin and continue with Brilinta.  Once done with the 30 days you will resume Aspirin with your Brilinta.

## 2025-04-03 NOTE — DISCHARGE NOTE PROVIDER - PROVIDER TOKENS
PROVIDER:[TOKEN:[[id number]],SCHEDULEDAPPTTIME:[08:30 AM]] PROVIDER:[TOKEN:[[id number]],SCHEDULEDAPPT:[05/27/2025],SCHEDULEDAPPTTIME:[08:30 AM],ESTABLISHEDPATIENT:[T]]

## 2025-04-03 NOTE — DISCHARGE NOTE PROVIDER - HOSPITAL COURSE
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 Hemostasis achieved via  manual compression site stable -- soft, nontender, no hematoma. Denies any complaints at this time. Patient remained hemodynamically stable post-procedure. Remained overnight for observation and pain control. Pt will be started on Eliquis for one month. Patient has been medically cleared for discharge as per Dr. Chaves. Patient has been given appropriate discharge instructions including medication regimen, access site management and follow up. He was started on Eliquis and aspirin discontinued. Patient will f/u with Dr. Chaves at scheduled post-ablation appointment for further management.       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  On 4/3/25 patient underwent VT ablation via right femoral vein. Hemostasis achieved via  manual compression site stable -- soft, nontender, no hematoma. Denies any complaints at this time. Patient remained hemodynamically stable post-procedure. Remained overnight for observation and pain control. Pt will be started on Eliquis for one month and continue Brilinta for his CAD and drop ASA.  He will resume ASA once Eliquis is discontinued in one month.  Patient has been medically cleared for discharge as per Dr. Chaves. Patient has been given appropriate discharge instructions including medication regimen, access site management and follow up. Patient will f/u with Dr. Chaves at scheduled post-ablation appointment for further management.

## 2025-04-03 NOTE — PROVIDER CONTACT NOTE (OTHER) - ASSESSMENT
RN assessment: Pt. A&OX4. Right groin post procedural site clean dry and intact with dressing in place. No bleeding or hematoma noted. +2 pedal pulses. Feet warm and mobile. pt denies CP, SOB, Palpitations, Lightheadedness or dizziness.

## 2025-04-03 NOTE — DISCHARGE NOTE PROVIDER - NSDCMRMEDTOKEN_GEN_ALL_CORE_FT
amiodarone 200 mg oral tablet: 2 tab(s) orally 2 times a day  Eliquis 5 mg oral tablet: 1 tab(s) orally every 12 hours  Farxiga 5 mg oral tablet: 1 tab(s) orally once a day  Lantus Solostar Pen 100 units/mL subcutaneous solution: 5 unit(s) subcutaneous once a day (at bedtime)  lisinopril 5 mg oral tablet: 1 tab(s) orally once a day  Ozempic 2 mg/1.5 mL (0.25 mg or 0.5 mg dose) subcutaneous solution: 0.5 milligram(s) subcutaneously once a week  rosuvastatin 40 mg oral tablet: 1 tab(s) orally once a day (at bedtime)  sodium bicarbonate: 650 milligram(s) orally 2 times a day  ticagrelor 90 mg oral tablet: 1 orally 2 times a day   amiodarone 200 mg oral tablet: 1 tab(s) orally once a day  Eliquis 5 mg oral tablet: 1 tab(s) orally every 12 hours  Farxiga 5 mg oral tablet: 1 tab(s) orally once a day  Lantus Solostar Pen 100 units/mL subcutaneous solution: 5 unit(s) subcutaneous once a day (at bedtime)  lisinopril 5 mg oral tablet: 1 tab(s) orally once a day  Ozempic 2 mg/1.5 mL (0.25 mg or 0.5 mg dose) subcutaneous solution: 0.5 milligram(s) subcutaneously once a week  rosuvastatin 40 mg oral tablet: 1 tab(s) orally once a day (at bedtime)  sodium bicarbonate: 650 milligram(s) orally 2 times a day  ticagrelor 90 mg oral tablet: 1 orally 2 times a day

## 2025-04-03 NOTE — DISCHARGE NOTE PROVIDER - CARE PROVIDER_API CALL
Dayo Chaves  Surgery [facility]  [address]  [place] [address]  Phone: [phone]  Fax: [phone]  Follow Up Time:    Kenney Chaves.  Cardiac Electrophysiology  [address] [place] [id number]  Phone: [phone]  Fax: [phone]  Established Patient  Scheduled Appointment: 05/27/2025 08:30 AM

## 2025-04-03 NOTE — DISCHARGE NOTE PROVIDER - NSDCFUSCHEDAPPT_GEN_ALL_CORE_FT
Victoriano Charles  [facility]  NEPHRO [address]  Scheduled Appointment: 04/15/2025    Kenney Chaves  [facility]  ELECTROPH [address]  Scheduled Appointment: 05/27/2025

## 2025-04-03 NOTE — DISCHARGE NOTE PROVIDER - NSDCHC_MEDRECSTATUS_GEN_ALL_CORE
Admission Reconciliation is Not Complete  Discharge Reconciliation is Completed Initial (On Arrival) Admission Reconciliation is Completed  Discharge Reconciliation is Completed

## 2025-04-04 ENCOUNTER — TRANSCRIPTION ENCOUNTER (OUTPATIENT)
Age: 65
End: 2025-04-04

## 2025-04-04 VITALS
SYSTOLIC BLOOD PRESSURE: 102 MMHG | DIASTOLIC BLOOD PRESSURE: 51 MMHG | HEART RATE: 68 BPM | OXYGEN SATURATION: 95 % | RESPIRATION RATE: 17 BRPM | TEMPERATURE: 98 F

## 2025-04-04 LAB
ANION GAP SERPL CALC-SCNC: 15 MMOL/L — SIGNIFICANT CHANGE UP (ref 5–17)
BUN SERPL-MCNC: 29 MG/DL — HIGH (ref 7–23)
CALCIUM SERPL-MCNC: 8.5 MG/DL — SIGNIFICANT CHANGE UP (ref 8.4–10.5)
CHLORIDE SERPL-SCNC: 107 MMOL/L — SIGNIFICANT CHANGE UP (ref 96–108)
CO2 SERPL-SCNC: 18 MMOL/L — LOW (ref 22–31)
CREAT SERPL-MCNC: 2.66 MG/DL — HIGH (ref 0.5–1.3)
EGFR: 26 ML/MIN/1.73M2 — LOW
EGFR: 26 ML/MIN/1.73M2 — LOW
GLUCOSE BLDC GLUCOMTR-MCNC: 204 MG/DL — HIGH (ref 70–99)
GLUCOSE SERPL-MCNC: 250 MG/DL — HIGH (ref 70–99)
HCT VFR BLD CALC: 29.4 % — LOW (ref 39–50)
HGB BLD-MCNC: 9.9 G/DL — LOW (ref 13–17)
MAGNESIUM SERPL-MCNC: 2.1 MG/DL — SIGNIFICANT CHANGE UP (ref 1.6–2.6)
MCHC RBC-ENTMCNC: 29 PG — SIGNIFICANT CHANGE UP (ref 27–34)
MCHC RBC-ENTMCNC: 33.7 G/DL — SIGNIFICANT CHANGE UP (ref 32–36)
MCV RBC AUTO: 86.2 FL — SIGNIFICANT CHANGE UP (ref 80–100)
NRBC BLD AUTO-RTO: 0 /100 WBCS — SIGNIFICANT CHANGE UP (ref 0–0)
PLATELET # BLD AUTO: 217 K/UL — SIGNIFICANT CHANGE UP (ref 150–400)
POTASSIUM SERPL-MCNC: 4.4 MMOL/L — SIGNIFICANT CHANGE UP (ref 3.5–5.3)
POTASSIUM SERPL-SCNC: 4.4 MMOL/L — SIGNIFICANT CHANGE UP (ref 3.5–5.3)
RBC # BLD: 3.41 M/UL — LOW (ref 4.2–5.8)
RBC # FLD: 14.5 % — SIGNIFICANT CHANGE UP (ref 10.3–14.5)
SODIUM SERPL-SCNC: 140 MMOL/L — SIGNIFICANT CHANGE UP (ref 135–145)
WBC # BLD: 6.57 K/UL — SIGNIFICANT CHANGE UP (ref 3.8–10.5)
WBC # FLD AUTO: 6.57 K/UL — SIGNIFICANT CHANGE UP (ref 3.8–10.5)

## 2025-04-04 PROCEDURE — 86850 RBC ANTIBODY SCREEN: CPT

## 2025-04-04 PROCEDURE — C1759: CPT

## 2025-04-04 PROCEDURE — 93654 COMPRE EP EVAL TX VT: CPT

## 2025-04-04 PROCEDURE — 83735 ASSAY OF MAGNESIUM: CPT

## 2025-04-04 PROCEDURE — C1766: CPT

## 2025-04-04 PROCEDURE — 85027 COMPLETE CBC AUTOMATED: CPT

## 2025-04-04 PROCEDURE — 86901 BLOOD TYPING SEROLOGIC RH(D): CPT

## 2025-04-04 PROCEDURE — 99232 SBSQ HOSP IP/OBS MODERATE 35: CPT

## 2025-04-04 PROCEDURE — C9399: CPT

## 2025-04-04 PROCEDURE — 80048 BASIC METABOLIC PNL TOTAL CA: CPT

## 2025-04-04 PROCEDURE — C1889: CPT

## 2025-04-04 PROCEDURE — 82962 GLUCOSE BLOOD TEST: CPT

## 2025-04-04 PROCEDURE — 93005 ELECTROCARDIOGRAM TRACING: CPT

## 2025-04-04 PROCEDURE — C1894: CPT

## 2025-04-04 PROCEDURE — C1760: CPT

## 2025-04-04 PROCEDURE — 93462 L HRT CATH TRNSPTL PUNCTURE: CPT

## 2025-04-04 PROCEDURE — C1732: CPT

## 2025-04-04 PROCEDURE — 86900 BLOOD TYPING SEROLOGIC ABO: CPT

## 2025-04-04 RX ORDER — AMIODARONE HYDROCHLORIDE 50 MG/ML
1 INJECTION, SOLUTION INTRAVENOUS
Qty: 0 | Refills: 0 | DISCHARGE
Start: 2025-04-04

## 2025-04-04 RX ORDER — AMIODARONE HYDROCHLORIDE 50 MG/ML
2 INJECTION, SOLUTION INTRAVENOUS
Refills: 0 | DISCHARGE

## 2025-04-04 RX ADMIN — APIXABAN 5 MILLIGRAM(S): 2.5 TABLET, FILM COATED ORAL at 06:27

## 2025-04-04 RX ADMIN — INSULIN LISPRO 2: 100 INJECTION, SOLUTION INTRAVENOUS; SUBCUTANEOUS at 07:41

## 2025-04-04 RX ADMIN — Medication 650 MILLIGRAM(S): at 06:27

## 2025-04-04 RX ADMIN — LISINOPRIL 5 MILLIGRAM(S): 5 TABLET ORAL at 06:26

## 2025-04-04 RX ADMIN — TICAGRELOR 90 MILLIGRAM(S): 90 TABLET ORAL at 06:27

## 2025-04-04 RX ADMIN — AMIODARONE HYDROCHLORIDE 200 MILLIGRAM(S): 50 INJECTION, SOLUTION INTRAVENOUS at 06:27

## 2025-04-04 NOTE — DISCHARGE NOTE NURSING/CASE MANAGEMENT/SOCIAL WORK - NSDCVIVACCINE_GEN_ALL_CORE_FT
Tdap; 29-Jul-2017 02:23; Ida Harvey (RN); [employer]; [id number]; IntraMuscular; Deltoid Right.; 0.5 milliLiter(s); VIS (VIS Published: 09-May-2013, VIS Presented: 29-Jul-2017);

## 2025-04-04 NOTE — PROGRESS NOTE ADULT - ASSESSMENT
HPI:  EP: Dr. Kenney Chaves  Cardiologist: Dr. Bharti Quispe  Nephrologist: Dr. 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 Pt with 2 episodes of VT that were symptomatic. Has been maintained on Amiodarone. Pt presents today for VT ablation. Denies headaches, changes in vision, dizziness, chest pain, palpitations, SOB/PITTS, abdominal pain, N/V/D, leg pain/edema, hematuria, BRBPR, melena or any other complaints.     # VT  4/3 s/p VT ablation via RFV w/ vascade x3, RFA manual  Start Eliquis 5 mg BID x1 month, stop Aspirin while on Eliquis  Resume Amiodarone 200 mg daily  Continue monitoring telemetry  Keep Potassium> 4.0 and Magnesium>2.0  Follow up w/ post procedure EP appt  Anticipate discharge home if telemetry stable with stable vitals and labs, if site and condition remain stable    # HLD  Continue Rosuvastatin 40 mg daily  DASH diet    # HTN  Normotensive  Continue Lisinopril 5 mg daily  DASH diet    # T2DM  Continue SSI while inpatient  FS TID/HS  Continue consistent carb diet  Will resume home diabetic medication regimen upon discharge    Alan Franz [facility]  Ext [phone]

## 2025-04-04 NOTE — DISCHARGE NOTE NURSING/CASE MANAGEMENT/SOCIAL WORK - FINANCIAL ASSISTANCE
[facility] provides services at a reduced cost to those who are determined to be eligible through [facility]’s financial assistance program. Information regarding [facility]’s financial assistance program can be found by going to [email or web address] or by calling [phone].

## 2025-04-04 NOTE — PROGRESS NOTE ADULT - SUBJECTIVE AND OBJECTIVE BOX
[facility] ELECTROPHYSIOLOGY  [phone]    CHIEF COMPLAINT: Patient is a 64y old male who presents with a chief complaint of symptomatic VT    HPI:  EP: Dr. Kenney Chaves  Cardiologist: Dr. Bharti Quispe  Nephrologist: Dr. 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 Pt with 2 episodes of VT that were symptomatic. Has been maintained on Amiodarone. Pt presents today for VT ablation. Denies headaches, changes in vision, dizziness, chest pain, palpitations, SOB/PITTS, abdominal pain, N/V/D, leg pain/edema, hematuria, BRBPR, melena or any other complaints.     Subjective/Observations: patient seen and examined.  denies chest pain, dyspena, dizziness, palpitations, N&V, HA    Review of Systems all WNL except below indicated:    Constitutional: [ ] Fever [ ] Chills [ ] Fatigue [ ] Weight change   HEENT: [ ] Blurred vision [ ] Eye Pain [ ] Headache [ ] Runny nose [ ] Sore Throat   Respiratory: [ ] Cough [ ] Wheezing [ ] Shortness of breath  Cardiovascular: [ ] Chest Pain [ ] Palpitations [ ] PITTS [ ] PND [ ] Orthopnea  Gastrointestinal: [ ] Abdominal Pain [ ] Diarrhea [ ] Constipation [ ] Hemorrhoids [ ] Nausea [ ] Vomiting  Genitourinary: [ ] Nocturia [ ] Dysuria [ ] Incontinence  Extremities: [ ] Swelling [ ] Joint Pain  Neurologic: [ ] Focal deficit [ ] Paresthesias [ ] Syncope  Lymphatic: [ ] Swelling [ ] Lymphadenopathy   Skin: [ ] Rash [ ] Ecchymoses [ ] Wounds [ ] Lesions  Psychiatry: [ ] Depression [ ] Suicidal/Homicidal Ideation [ ] Anxiety [ ] Sleep Disturbances  [ ] 10 point review of systems is otherwise negative except as mentioned above            [ ]Unable to obtain    PAST MEDICAL & SURGICAL HISTORY:  DM (diabetes mellitus)    CAD (coronary artery disease)    Other hyperlipidemia    Stented coronary artery  times 6 ; last 4/2016 total of 6 stents , on brilinta and aspirin which has not been discontinued , cardiac evaluation scheduled today    Heart failure  4/2016 , request last echo to be faxed from Dr Quispe with cardiac clearance    Essential hypertension    Obesity (BMI 30-39.9)  BMI 39.9    Stage 3 chronic kidney disease    History of ventricular tachycardia    PAD (peripheral artery disease)    Cervical vertebral fusion  with hardware    Right wrist fracture  with hardware    H/O shoulder surgery  right - 1990's    MEDICATIONS  (STANDING):  aMIOdarone    Tablet 200 milliGRAM(s) Oral daily  apixaban 5 milliGRAM(s) Oral every 12 hours  insulin lispro (ADMELOG) corrective regimen sliding scale   SubCutaneous three times a day before meals  insulin lispro (ADMELOG) corrective regimen sliding scale   SubCutaneous at bedtime  lisinopril 5 milliGRAM(s) Oral daily  rosuvastatin 40 milliGRAM(s) Oral at bedtime  sodium bicarbonate 650 milliGRAM(s) Oral two times a day  ticagrelor 90 milliGRAM(s) Oral every 12 hours    MEDICATIONS  (PRN):    Allergies    No Known Allergies    Intolerances    Vital Signs Last 24 Hrs  T(C): 36.8 (03 Apr 2025 16:10), Max: 37.3 (03 Apr 2025 09:16)  T(F): 98.2 (03 Apr 2025 16:10), Max: 99.1 (03 Apr 2025 09:16)  HR: 75 (03 Apr 2025 19:10) (67 - 78)  BP: 130/60 (03 Apr 2025 19:10) (102/59 - 154/73)  BP(mean): 87 (03 Apr 2025 19:10) (78 - 89)  RR: 18 (03 Apr 2025 19:10) (14 - 18)  SpO2: 98% (03 Apr 2025 19:10) (95% - 99%)    Parameters below as of 03 Apr 2025 20:10  Patient On (Oxygen Delivery Method): room air    I&O's Summary    Weight (kg): 99.8 (04-03 @ 10:58)    FOCUSED PHYSICAL EXAM:  Neuro: No apparent distress, alert and oriented times x3, appropriate affect  Pulmonary: Non-labored, breath sounds are clear bilaterally, No wheezing, rales or rhonchi  Cardiovascular: Regular, S1 and S2, No murmurs, rubs, gallops or clicks  Site: Right groin w/ vascade x3: Soft, non tender, no bleeding or hematoma. Pulses in the right lower extremity are palpable  No clubbing, cyanosis or edema    LABS: All Labs Reviewed:    RESULTS:    ECG:  Ventricular Rate 76 BPM    Atrial Rate 76 BPM    P-R Interval 178 ms    QRS Duration 86 ms    Q-T Interval 410 ms    QTC Calculation(Bazett) 461 ms    P Axis 20 degrees    R Axis 11 degrees    T Axis 7 degrees    Diagnosis Line NORMAL SINUS RHYTHM  SEPTAL INFARCT (CITED ON OR BEFORE 03-AUG-2023)  ABNORMAL ECG  WHEN COMPARED WITH ECG OF 03-AUG-2023 21:02,  NO SIGNIFICANT CHANGE WAS FOUND  Confirmed by MANUEL Riddle Zlata ([id number]) on 4/3/2025 6:30:42 PM    ECHO: 8/4/23:  CONCLUSIONS:   1. The left ventricular systolic functionis mildly decreased with an ejection fraction of 49 % by Marroquin's method of disks. No evidence of a thrombus in the left ventricle.   2. Multiple segmental abnormalities exist. See findings.   3. Normal right ventricular cavity size, normal right ventricular wall thickness and normal right ventricular systolic function.   4. There is moderate (grade 2) left ventricular diastolic dysfunction.   5. No prior echocardiogram is available for comparison.   6. No significant valvular disease.

## 2025-04-04 NOTE — DISCHARGE NOTE NURSING/CASE MANAGEMENT/SOCIAL WORK - PATIENT PORTAL LINK FT
You can access the FollowMyHealth Patient Portal offered by [facility] by registering at the following website: [email or web address]. By joining [facility]’s FollowMyHealth portal, you will also be able to view your health information using other applications (apps) compatible with our system.

## 2025-04-09 PROBLEM — I73.9 PERIPHERAL VASCULAR DISEASE, UNSPECIFIED: Chronic | Status: ACTIVE | Noted: 2025-04-03

## 2025-04-09 PROBLEM — Z86.79 PERSONAL HISTORY OF OTHER DISEASES OF THE CIRCULATORY SYSTEM: Chronic | Status: ACTIVE | Noted: 2025-04-03

## 2025-04-15 ENCOUNTER — APPOINTMENT (OUTPATIENT)
Dept: NEPHROLOGY | Facility: CLINIC | Age: 65
End: 2025-04-15
Payer: MEDICARE

## 2025-04-15 VITALS
RESPIRATION RATE: 16 BRPM | SYSTOLIC BLOOD PRESSURE: 118 MMHG | BODY MASS INDEX: 31.5 KG/M2 | HEART RATE: 68 BPM | WEIGHT: 220 LBS | OXYGEN SATURATION: 99 % | HEIGHT: 70 IN | DIASTOLIC BLOOD PRESSURE: 62 MMHG

## 2025-04-15 DIAGNOSIS — I25.10 ATHEROSCLEROTIC HEART DISEASE OF NATIVE CORONARY ARTERY W/OUT ANGINA PECTORIS: ICD-10-CM

## 2025-04-15 DIAGNOSIS — N18.32 CHRONIC KIDNEY DISEASE, STAGE 3B: ICD-10-CM

## 2025-04-15 PROCEDURE — 99213 OFFICE O/P EST LOW 20 MIN: CPT

## 2025-05-07 ENCOUNTER — NON-APPOINTMENT (OUTPATIENT)
Age: 65
End: 2025-05-07

## 2025-05-15 ENCOUNTER — NON-APPOINTMENT (OUTPATIENT)
Age: 65
End: 2025-05-15

## 2025-05-15 ENCOUNTER — APPOINTMENT (OUTPATIENT)
Dept: ELECTROPHYSIOLOGY | Facility: CLINIC | Age: 65
End: 2025-05-15
Payer: MEDICARE

## 2025-05-15 VITALS
RESPIRATION RATE: 18 BRPM | HEIGHT: 70 IN | OXYGEN SATURATION: 99 % | TEMPERATURE: 97.2 F | SYSTOLIC BLOOD PRESSURE: 124 MMHG | HEART RATE: 76 BPM | WEIGHT: 218 LBS | BODY MASS INDEX: 31.21 KG/M2 | DIASTOLIC BLOOD PRESSURE: 75 MMHG

## 2025-05-15 DIAGNOSIS — I47.20 VENTRICULAR TACHYCARDIA, UNSPECIFIED: ICD-10-CM

## 2025-05-15 PROCEDURE — 93282 PRGRMG EVAL IMPLANTABLE DFB: CPT

## 2025-05-15 PROCEDURE — 99213 OFFICE O/P EST LOW 20 MIN: CPT

## 2025-05-27 ENCOUNTER — APPOINTMENT (OUTPATIENT)
Dept: ELECTROPHYSIOLOGY | Facility: CLINIC | Age: 65
End: 2025-05-27

## 2025-08-12 ENCOUNTER — APPOINTMENT (OUTPATIENT)
Dept: ENDOCRINOLOGY | Facility: CLINIC | Age: 65
End: 2025-08-12
Payer: MEDICARE

## 2025-08-12 VITALS
HEIGHT: 70 IN | BODY MASS INDEX: 31.92 KG/M2 | OXYGEN SATURATION: 99 % | HEART RATE: 81 BPM | TEMPERATURE: 98.2 F | DIASTOLIC BLOOD PRESSURE: 72 MMHG | RESPIRATION RATE: 18 BRPM | SYSTOLIC BLOOD PRESSURE: 112 MMHG | WEIGHT: 223 LBS

## 2025-08-12 DIAGNOSIS — E78.5 HYPERLIPIDEMIA, UNSPECIFIED: ICD-10-CM

## 2025-08-12 DIAGNOSIS — E55.9 VITAMIN D DEFICIENCY, UNSPECIFIED: ICD-10-CM

## 2025-08-12 PROCEDURE — 95251 CONT GLUC MNTR ANALYSIS I&R: CPT

## 2025-08-12 PROCEDURE — 36415 COLL VENOUS BLD VENIPUNCTURE: CPT

## 2025-08-12 PROCEDURE — 99214 OFFICE O/P EST MOD 30 MIN: CPT

## 2025-08-13 LAB
25(OH)D3 SERPL-MCNC: 31.3 NG/ML
ALBUMIN SERPL ELPH-MCNC: 4.2 G/DL
ALP BLD-CCNC: 76 U/L
ALT SERPL-CCNC: 28 U/L
ANION GAP SERPL CALC-SCNC: 14 MMOL/L
AST SERPL-CCNC: 26 U/L
BASOPHILS # BLD AUTO: 0.09 K/UL
BASOPHILS NFR BLD AUTO: 1.2 %
BILIRUB SERPL-MCNC: 0.4 MG/DL
BUN SERPL-MCNC: 32 MG/DL
CALCIUM SERPL-MCNC: 9.2 MG/DL
CHLORIDE SERPL-SCNC: 105 MMOL/L
CHOLEST SERPL-MCNC: 91 MG/DL
CO2 SERPL-SCNC: 22 MMOL/L
CREAT SERPL-MCNC: 2.41 MG/DL
EGFRCR SERPLBLD CKD-EPI 2021: 29 ML/MIN/1.73M2
EOSINOPHIL # BLD AUTO: 0.13 K/UL
EOSINOPHIL NFR BLD AUTO: 1.7 %
ESTIMATED AVERAGE GLUCOSE: 151 MG/DL
GLUCOSE SERPL-MCNC: 164 MG/DL
HBA1C MFR BLD HPLC: 6.9 %
HCT VFR BLD CALC: 37.2 %
HDLC SERPL-MCNC: 26 MG/DL
HGB BLD-MCNC: 11.2 G/DL
IMM GRANULOCYTES NFR BLD AUTO: 0.3 %
LDLC SERPL-MCNC: 42 MG/DL
LYMPHOCYTES # BLD AUTO: 2.05 K/UL
LYMPHOCYTES NFR BLD AUTO: 26.4 %
MAN DIFF?: NORMAL
MCHC RBC-ENTMCNC: 26.4 PG
MCHC RBC-ENTMCNC: 30.1 G/DL
MCV RBC AUTO: 87.5 FL
MONOCYTES # BLD AUTO: 0.64 K/UL
MONOCYTES NFR BLD AUTO: 8.2 %
NEUTROPHILS # BLD AUTO: 4.84 K/UL
NEUTROPHILS NFR BLD AUTO: 62.2 %
NONHDLC SERPL-MCNC: 65 MG/DL
PLATELET # BLD AUTO: 195 K/UL
POTASSIUM SERPL-SCNC: 4.2 MMOL/L
PROT SERPL-MCNC: 7 G/DL
RBC # BLD: 4.25 M/UL
RBC # FLD: 16.5 %
SODIUM SERPL-SCNC: 141 MMOL/L
TRIGL SERPL-MCNC: 125 MG/DL
TSH SERPL-ACNC: 1.11 UIU/ML
WBC # FLD AUTO: 7.77 K/UL

## 2025-08-14 ENCOUNTER — APPOINTMENT (OUTPATIENT)
Dept: NEPHROLOGY | Facility: CLINIC | Age: 65
End: 2025-08-14
Payer: MEDICARE

## 2025-08-14 VITALS
WEIGHT: 222 LBS | SYSTOLIC BLOOD PRESSURE: 104 MMHG | RESPIRATION RATE: 16 BRPM | DIASTOLIC BLOOD PRESSURE: 68 MMHG | HEART RATE: 78 BPM | HEIGHT: 70 IN | OXYGEN SATURATION: 99 % | BODY MASS INDEX: 31.78 KG/M2

## 2025-08-14 DIAGNOSIS — I10 ESSENTIAL (PRIMARY) HYPERTENSION: ICD-10-CM

## 2025-08-14 DIAGNOSIS — E11.9 TYPE 2 DIABETES MELLITUS W/OUT COMPLICATIONS: ICD-10-CM

## 2025-08-14 DIAGNOSIS — N18.32 CHRONIC KIDNEY DISEASE, STAGE 3B: ICD-10-CM

## 2025-08-14 PROCEDURE — 99213 OFFICE O/P EST LOW 20 MIN: CPT

## 2025-08-28 ENCOUNTER — APPOINTMENT (OUTPATIENT)
Dept: ELECTROPHYSIOLOGY | Facility: CLINIC | Age: 65
End: 2025-08-28
Payer: MEDICARE

## 2025-08-28 VITALS
WEIGHT: 214 LBS | HEIGHT: 70 IN | SYSTOLIC BLOOD PRESSURE: 121 MMHG | OXYGEN SATURATION: 98 % | HEART RATE: 95 BPM | BODY MASS INDEX: 30.64 KG/M2 | TEMPERATURE: 97.3 F | RESPIRATION RATE: 18 BRPM | DIASTOLIC BLOOD PRESSURE: 68 MMHG

## 2025-08-28 DIAGNOSIS — I47.20 VENTRICULAR TACHYCARDIA, UNSPECIFIED: ICD-10-CM

## 2025-08-28 PROCEDURE — 99213 OFFICE O/P EST LOW 20 MIN: CPT

## 2025-08-28 PROCEDURE — 93282 PRGRMG EVAL IMPLANTABLE DFB: CPT

## 2025-09-03 ENCOUNTER — APPOINTMENT (OUTPATIENT)
Dept: INTERNAL MEDICINE | Facility: CLINIC | Age: 65
End: 2025-09-03

## 2025-09-03 ENCOUNTER — APPOINTMENT (OUTPATIENT)
Dept: CARDIOLOGY | Facility: CLINIC | Age: 65
End: 2025-09-03
Payer: MEDICARE

## 2025-09-03 ENCOUNTER — NON-APPOINTMENT (OUTPATIENT)
Age: 65
End: 2025-09-03

## 2025-09-03 VITALS
SYSTOLIC BLOOD PRESSURE: 130 MMHG | RESPIRATION RATE: 12 BRPM | BODY MASS INDEX: 30.64 KG/M2 | HEART RATE: 76 BPM | DIASTOLIC BLOOD PRESSURE: 70 MMHG | OXYGEN SATURATION: 98 % | HEIGHT: 70 IN | WEIGHT: 214 LBS

## 2025-09-03 DIAGNOSIS — I50.22 CHRONIC SYSTOLIC (CONGESTIVE) HEART FAILURE: ICD-10-CM

## 2025-09-03 DIAGNOSIS — I25.10 ATHEROSCLEROTIC HEART DISEASE OF NATIVE CORONARY ARTERY W/OUT ANGINA PECTORIS: ICD-10-CM

## 2025-09-03 DIAGNOSIS — N18.32 CHRONIC KIDNEY DISEASE, STAGE 3B: ICD-10-CM

## 2025-09-03 PROCEDURE — 99214 OFFICE O/P EST MOD 30 MIN: CPT

## 2025-09-03 PROCEDURE — G2211 COMPLEX E/M VISIT ADD ON: CPT

## 2025-09-03 PROCEDURE — 93000 ELECTROCARDIOGRAM COMPLETE: CPT

## (undated) DEVICE — MEDICATION LABELS W MARKER

## (undated) DEVICE — BLADE SCALPEL SAFETYLOCK #11

## (undated) DEVICE — VISITEC 4X4

## (undated) DEVICE — TORQUE DEVICE FOR GUIDEWIRE 0.0100.038"

## (undated) DEVICE — DRSG COMBINE 5X9"

## (undated) DEVICE — BUR STRYKER OVAL SOLID CARBIDE MED 4MM

## (undated) DEVICE — SAW BLADE MICROAIRE OSCILATING 25.4MM X 90MM X 1.27MM

## (undated) DEVICE — PACKING GAUZE PLAIN 2"

## (undated) DEVICE — CONN DUAL HOSE

## (undated) DEVICE — STRYKER INTERPULSE HANDPIECE W IRR SUCTION TUBE

## (undated) DEVICE — DRAPE C ARM UNIVERSAL

## (undated) DEVICE — SYR LUER LOK 20CC

## (undated) DEVICE — DRSG STOCKINETTE IMPERVIOUS MED

## (undated) DEVICE — PACKING GAUZE IODOFORM 2"

## (undated) DEVICE — NDL PERC BASEPLT 18GX7CM

## (undated) DEVICE — SUT POLYSORB 2-0 30" GS-21 UNDYED

## (undated) DEVICE — SOL IRR BAG NS 0.9% 3000ML

## (undated) DEVICE — DRAPE LIGHT HANDLE COVER (BLUE)

## (undated) DEVICE — DRSG TEGADERM 6"X8"

## (undated) DEVICE — GOWN TRIMAX XXL

## (undated) DEVICE — DRAPE FEMORAL ANGIOGRAPHY W TROUGH

## (undated) DEVICE — SYR LUER LOK 10CC

## (undated) DEVICE — PACKING GAUZE PLAIN 0.5"

## (undated) DEVICE — TAPE GLO-N-TELL RADIOPAQUE 20 STRIPS

## (undated) DEVICE — MARKING PEN W RULER

## (undated) DEVICE — GLV 6.5 PROTEXIS (WHITE)

## (undated) DEVICE — SOL IRR POUR NS 0.9% 500ML

## (undated) DEVICE — ELCTR BOVIE PENCIL HANDPIECE

## (undated) DEVICE — PACK BASIN SPECIAL PROCEDURE

## (undated) DEVICE — LAP PAD 18 X 18"

## (undated) DEVICE — SOL IRR POUR H2O 250ML

## (undated) DEVICE — SUT PLAIN GUT 4-0 18" P-12

## (undated) DEVICE — COVER PROBE W/GEL 18X120CM STRL 50/BX

## (undated) DEVICE — DRAPE TOWEL BLUE 17" X 24"

## (undated) DEVICE — GLV 8 PROTEXIS (WHITE)

## (undated) DEVICE — Device

## (undated) DEVICE — VENODYNE/SCD SLEEVE CALF LARGE

## (undated) DEVICE — DRAPE INSTRUMENT POUCH 6.75" X 11"

## (undated) DEVICE — GLV 7 PROTEXIS (WHITE)

## (undated) DEVICE — INFLATION DEVICE BASIXCOMPAK

## (undated) DEVICE — SUT BIOSYN 4-0 18" P-12

## (undated) DEVICE — DRSG OPSITE 13.75 X 4"

## (undated) DEVICE — DRAPE MAGNETIC INSTRUMENT MEDIUM

## (undated) DEVICE — DRSG KLING 4"

## (undated) DEVICE — POSITIONER FOAM EGG CRATE ULNAR 2PCS (PINK)

## (undated) DEVICE — NDL HYPO REGULAR BEVEL 25G X 1.5" (BLUE)

## (undated) DEVICE — NDL COUNTER FOAM AND MAGNET 40-70

## (undated) DEVICE — DRAPE 3/4 SHEET W REINFORCEMENT 56X77"

## (undated) DEVICE — BLADE SCALPEL SAFETYLOCK #10

## (undated) DEVICE — GOWN TRIMAX LG

## (undated) DEVICE — DRSG ACE BANDAGE 6"

## (undated) DEVICE — SAW BLADE MICROAIRE SAGITTAL 9.4MMX25.4MMX0.6MM

## (undated) DEVICE — STAPLER SKIN VISI-STAT 35 WIDE

## (undated) DEVICE — DRAPE MAYO STAND 30"

## (undated) DEVICE — PACK EXTREMITY

## (undated) DEVICE — WARMING BLANKET UPPER ADULT

## (undated) DEVICE — SAW BLADE MICROAIRE SAGITTAL 5.8X25.4X0.6 MM

## (undated) DEVICE — GLV 7.5 PROTEXIS (WHITE)

## (undated) DEVICE — SPECIMEN CONTAINER 100ML

## (undated) DEVICE — SUT PROLENE 7-0 24" BV175-6

## (undated) DEVICE — PREP CHLORAPREP HI-LITE ORANGE 26ML

## (undated) DEVICE — DRSG STOCKINETTE IMPERVIOUS XL

## (undated) DEVICE — DRAPE IOBAN 23" X 23"

## (undated) DEVICE — TUBING HIGH POWER CONTRAST INJ

## (undated) DEVICE — DRSG STERISTRIPS 0.5 X 4"

## (undated) DEVICE — NDL ENTRY PERC MCKNIGHT 18G

## (undated) DEVICE — GLV 8.5 PROTEXIS (WHITE)

## (undated) DEVICE — FOLEY TRAY 16FR 5CC LTX UMETER CLOSED

## (undated) DEVICE — DRSG XEROFORM 1 X 8"

## (undated) DEVICE — DRAPE ISOLATION BAG 20X20"

## (undated) DEVICE — DRAPE EQUIPMENT BANDED BAG 30 X 30" (SHOWER CAP)

## (undated) DEVICE — SUCTION YANKAUER NO CONTROL VENT

## (undated) DEVICE — PREP BETADINE KIT

## (undated) DEVICE — DRAPE 1/2 SHEET 40X57"

## (undated) DEVICE — BLADE SCALPEL SAFETYLOCK #15

## (undated) DEVICE — PACK GENERAL MINOR

## (undated) DEVICE — DRAPE CAMERA VIDEO 7"X96"